# Patient Record
Sex: FEMALE | Race: WHITE | Employment: OTHER | URBAN - METROPOLITAN AREA
[De-identification: names, ages, dates, MRNs, and addresses within clinical notes are randomized per-mention and may not be internally consistent; named-entity substitution may affect disease eponyms.]

---

## 2021-03-24 ENCOUNTER — HOSPITAL ENCOUNTER (EMERGENCY)
Age: 71
Discharge: HOME | End: 2021-03-24
Attending: EMERGENCY MEDICINE
Payer: MEDICARE

## 2021-03-24 VITALS
HEIGHT: 63 IN | HEART RATE: 80 BPM | DIASTOLIC BLOOD PRESSURE: 91 MMHG | TEMPERATURE: 97.7 F | SYSTOLIC BLOOD PRESSURE: 133 MMHG | BODY MASS INDEX: 30.12 KG/M2 | RESPIRATION RATE: 16 BRPM | OXYGEN SATURATION: 95 % | WEIGHT: 170 LBS

## 2021-03-24 DIAGNOSIS — T78.40XA ALLERGIC REACTION TO DRUG, INITIAL ENCOUNTER: Primary | ICD-10-CM

## 2021-03-24 DIAGNOSIS — R21 RASH AND OTHER NONSPECIFIC SKIN ERUPTION: ICD-10-CM

## 2021-03-24 PROCEDURE — 99283 EMERGENCY DEPT VISIT LOW MDM: CPT

## 2021-03-24 PROCEDURE — 6370000000 HC RX 637 (ALT 250 FOR IP): Performed by: EMERGENCY MEDICINE

## 2021-03-24 RX ORDER — PREDNISONE 10 MG/1
20 TABLET ORAL DAILY
Qty: 10 TABLET | Refills: 0 | Status: SHIPPED | OUTPATIENT
Start: 2021-03-24 | End: 2021-03-29

## 2021-03-24 RX ORDER — HYDROXYZINE PAMOATE 25 MG/1
25 CAPSULE ORAL 3 TIMES DAILY PRN
Qty: 15 CAPSULE | Refills: 0 | Status: SHIPPED | OUTPATIENT
Start: 2021-03-24 | End: 2021-03-24 | Stop reason: SDUPTHER

## 2021-03-24 RX ORDER — PREDNISONE 20 MG/1
20 TABLET ORAL ONCE
Status: COMPLETED | OUTPATIENT
Start: 2021-03-24 | End: 2021-03-24

## 2021-03-24 RX ORDER — TRAZODONE HYDROCHLORIDE 100 MG/1
100 TABLET ORAL NIGHTLY
COMMUNITY

## 2021-03-24 RX ORDER — TRIAMCINOLONE ACETONIDE 5 MG/G
CREAM TOPICAL
Qty: 45 G | Refills: 0 | Status: SHIPPED | OUTPATIENT
Start: 2021-03-24 | End: 2021-03-31

## 2021-03-24 RX ORDER — DIPHENHYDRAMINE HCL 25 MG
25 TABLET ORAL ONCE
Status: COMPLETED | OUTPATIENT
Start: 2021-03-24 | End: 2021-03-24

## 2021-03-24 RX ORDER — TRIAMCINOLONE ACETONIDE 5 MG/G
CREAM TOPICAL
Qty: 45 G | Refills: 0 | Status: SHIPPED | OUTPATIENT
Start: 2021-03-24 | End: 2021-03-24 | Stop reason: SDUPTHER

## 2021-03-24 RX ORDER — PREDNISONE 10 MG/1
20 TABLET ORAL DAILY
Qty: 10 TABLET | Refills: 0 | Status: SHIPPED | OUTPATIENT
Start: 2021-03-24 | End: 2021-03-24 | Stop reason: SDUPTHER

## 2021-03-24 RX ORDER — DULOXETIN HYDROCHLORIDE 60 MG/1
90 CAPSULE, DELAYED RELEASE ORAL DAILY
COMMUNITY

## 2021-03-24 RX ORDER — HYDROXYZINE PAMOATE 25 MG/1
25 CAPSULE ORAL 3 TIMES DAILY PRN
Qty: 15 CAPSULE | Refills: 0 | Status: SHIPPED | OUTPATIENT
Start: 2021-03-24 | End: 2021-03-29

## 2021-03-24 RX ADMIN — DIPHENHYDRAMINE HYDROCHLORIDE 25 MG: 25 TABLET ORAL at 11:36

## 2021-03-24 RX ADMIN — PREDNISONE 20 MG: 20 TABLET ORAL at 11:37

## 2021-03-24 NOTE — ED PROVIDER NOTES
HPI:  3/24/21,   Time: 12:17 PM EDT         Myron Sanders is a 65 y.o. female presenting to the ED for rash left arm possibly from Covid vaccine, beginning more than 1 day ago. The complaint has been persistent, moderate in severity, and worsened by nothing. Patient denies any shortness of breath or throat closing. She has fairly large circular rash left upper arm where she received her vaccine. TEMPWETREAD      ROS:   Pertinent positives and negatives are stated within HPI, all other systems reviewed and are negative.  --------------------------------------------- PAST HISTORY ---------------------------------------------  Past Medical History:  has a past medical history of Depression and Post herpetic neuralgia. Past Surgical History:  has a past surgical history that includes bladder suspension; hernia repair; Cholecystectomy; and Varicose vein surgery. Social History:  reports that she has never smoked. She does not have any smokeless tobacco history on file. She reports previous alcohol use. She reports that she does not use drugs. Family History: family history is not on file. The patients home medications have been reviewed. Allergies: Betadine [povidone iodine]    -------------------------------------------------- RESULTS -------------------------------------------------  All laboratory and radiology results have been personally reviewed by myself   LABS:  No results found for this visit on 03/24/21. RADIOLOGY:  Interpreted by Radiologist.  No orders to display       ------------------------- NURSING NOTES AND VITALS REVIEWED ---------------------------   The nursing notes within the ED encounter and vital signs as below have been reviewed.    BP (!) 133/91   Pulse 80   Temp 97.7 °F (36.5 °C) (Temporal)   Resp 16   Ht 5' 3\" (1.6 m)   Wt 170 lb (77.1 kg)   SpO2 95%   BMI 30.11 kg/m²   Oxygen Saturation Interpretation: Normal      ---------------------------------------------------PHYSICAL EXAM--------------------------------------      Constitutional/General: Alert and oriented x3, well appearing, non toxic in NAD  Head: NC/AT  Eyes: PERRL, EOMI  Mouth: Oropharynx clear, handling secretions, no trismus  Neck: Supple, full ROM, no meningeal signs  Pulmonary: Lungs clear to auscultation bilaterally, no wheezes, rales, or rhonchi. Not in respiratory distress  Cardiovascular:  Regular rate and rhythm, no murmurs, gallops, or rubs. 2+ distal pulses  Abdomen: Soft, non tender, non distended,   Extremities: Moves all extremities x 4. Warm and well perfused  Skin: warm and dry ; 6 cm flat circular rash noted left upper arm laterally consistent where she received the vaccine. Neurologic: GCS 15,  Psych: Normal Affect      ------------------------------ ED COURSE/MEDICAL DECISION MAKING----------------------  Medications   predniSONE (DELTASONE) tablet 20 mg (20 mg Oral Given 3/24/21 1137)   diphenhydrAMINE (BENADRYL) tablet 25 mg (25 mg Oral Given 3/24/21 1136)         Medical Decision Making:    Exam and will start the patient on antihistamines and a short course of prednisone. Counseling: Patient is advised to be started on prednisone and an antihistamine, Vistaril for rash and itching. She is advised to follow-up with her PCP in 2 days however she states that she is in between doctors right now so she is advised that if symptoms worsen to return to this emergency room for reevaluation the emergency provider has spoken with the patient and discussed todays results, in addition to providing specific details for the plan of care and counseling regarding the diagnosis and prognosis. Questions are answered at this time and they are agreeable with the plan.      --------------------------------- IMPRESSION AND DISPOSITION ---------------------------------    IMPRESSION  1. Allergic reaction to drug, initial encounter    2.  Rash and other nonspecific skin eruption        DISPOSITION  Disposition: Discharge to home  Patient condition is fair                  Risa Mon MD  03/24/21 1782

## 2021-03-24 NOTE — ED NOTES
Pt presents with a 6 cm round red rash in left deltoid area from the covid vaccination that she received on the 12 th of this month. Pt denies any pain at this time. Pt is nervous.      Elizabeth Saucedo RN  03/24/21 1122

## 2021-04-20 ENCOUNTER — HOSPITAL ENCOUNTER (OUTPATIENT)
Dept: MAMMOGRAPHY | Age: 71
Discharge: HOME | End: 2021-04-22
Payer: MEDICARE

## 2021-04-20 DIAGNOSIS — Z12.31 BREAST CANCER SCREENING BY MAMMOGRAM: ICD-10-CM

## 2021-04-20 PROCEDURE — 77063 BREAST TOMOSYNTHESIS BI: CPT

## 2021-04-23 ENCOUNTER — HOSPITAL ENCOUNTER (OUTPATIENT)
Age: 71
Discharge: HOME | End: 2021-04-23
Payer: MEDICARE

## 2021-04-23 PROCEDURE — 36415 COLL VENOUS BLD VENIPUNCTURE: CPT

## 2021-04-23 PROCEDURE — 86803 HEPATITIS C AB TEST: CPT

## 2021-04-26 LAB — HEPATITIS C ANTIBODY INTERPRETATION: NORMAL

## 2022-06-06 ENCOUNTER — HOSPITAL ENCOUNTER (OUTPATIENT)
Age: 72
Discharge: HOME | End: 2022-06-08
Payer: MEDICARE

## 2022-06-06 ENCOUNTER — HOSPITAL ENCOUNTER (OUTPATIENT)
Dept: GENERAL RADIOLOGY | Age: 72
Discharge: HOME | End: 2022-06-08
Payer: MEDICARE

## 2022-06-06 DIAGNOSIS — M25.562 LEFT KNEE PAIN, UNSPECIFIED CHRONICITY: ICD-10-CM

## 2022-06-06 PROCEDURE — 73564 X-RAY EXAM KNEE 4 OR MORE: CPT

## 2022-07-18 ENCOUNTER — HOSPITAL ENCOUNTER (OUTPATIENT)
Age: 72
Discharge: HOME | End: 2022-07-18
Payer: MEDICARE

## 2022-07-18 LAB
GLUCOSE FASTING: 129 MG/DL (ref 74–99)
HBA1C MFR BLD: 6 % (ref 4–5.6)

## 2022-07-18 PROCEDURE — 82947 ASSAY GLUCOSE BLOOD QUANT: CPT

## 2022-07-18 PROCEDURE — 36415 COLL VENOUS BLD VENIPUNCTURE: CPT

## 2022-07-18 PROCEDURE — 83036 HEMOGLOBIN GLYCOSYLATED A1C: CPT

## 2022-08-09 ENCOUNTER — HOSPITAL ENCOUNTER (OUTPATIENT)
Dept: MAMMOGRAPHY | Age: 72
Discharge: HOME | End: 2022-08-11
Payer: MEDICARE

## 2022-08-09 DIAGNOSIS — Z12.31 ENCOUNTER FOR SCREENING MAMMOGRAM FOR MALIGNANT NEOPLASM OF BREAST: ICD-10-CM

## 2022-08-09 PROCEDURE — 77063 BREAST TOMOSYNTHESIS BI: CPT

## 2022-10-13 ENCOUNTER — HOSPITAL ENCOUNTER (EMERGENCY)
Age: 72
Discharge: HOME | End: 2022-10-13
Attending: EMERGENCY MEDICINE
Payer: MEDICARE

## 2022-10-13 ENCOUNTER — APPOINTMENT (OUTPATIENT)
Dept: GENERAL RADIOLOGY | Age: 72
End: 2022-10-13
Payer: MEDICARE

## 2022-10-13 VITALS
BODY MASS INDEX: 30.48 KG/M2 | WEIGHT: 172 LBS | SYSTOLIC BLOOD PRESSURE: 194 MMHG | DIASTOLIC BLOOD PRESSURE: 88 MMHG | TEMPERATURE: 97.5 F | HEIGHT: 63 IN | RESPIRATION RATE: 20 BRPM | HEART RATE: 79 BPM | OXYGEN SATURATION: 96 %

## 2022-10-13 DIAGNOSIS — S40.019A CONTUSION, SHOULDER AND UPPER ARM, MULTIPLE SITES, UNSPECIFIED LATERALITY, INITIAL ENCOUNTER: ICD-10-CM
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
## 2022-10-13 DIAGNOSIS — S40.029A CONTUSION, SHOULDER AND UPPER ARM, MULTIPLE SITES, UNSPECIFIED LATERALITY, INITIAL ENCOUNTER: ICD-10-CM

## 2022-10-13 PROCEDURE — 73090 X-RAY EXAM OF FOREARM: CPT

## 2022-10-13 PROCEDURE — 6370000000 HC RX 637 (ALT 250 FOR IP): Performed by: EMERGENCY MEDICINE

## 2022-10-13 PROCEDURE — 73060 X-RAY EXAM OF HUMERUS: CPT

## 2022-10-13 PROCEDURE — 99283 EMERGENCY DEPT VISIT LOW MDM: CPT

## 2022-10-13 RX ORDER — ACETAMINOPHEN 500 MG
1000 TABLET ORAL ONCE
Status: COMPLETED | OUTPATIENT
Start: 2022-10-13 | End: 2022-10-13

## 2022-10-13 RX ADMIN — ACETAMINOPHEN 1000 MG: 500 TABLET ORAL at 16:20

## 2022-10-13 ASSESSMENT — ENCOUNTER SYMPTOMS
SHORTNESS OF BREATH: 0
ABDOMINAL PAIN: 0
NAUSEA: 0
EYE REDNESS: 0
VOMITING: 0

## 2022-10-13 ASSESSMENT — PAIN - FUNCTIONAL ASSESSMENT: PAIN_FUNCTIONAL_ASSESSMENT: NONE - DENIES PAIN
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
## 2024-04-01 ENCOUNTER — HOSPITAL ENCOUNTER (INPATIENT)
Age: 74
LOS: 6 days | Discharge: HOME | DRG: 885 | End: 2024-04-08
Attending: EMERGENCY MEDICINE | Admitting: PSYCHIATRY & NEUROLOGY
Payer: MEDICARE

## 2024-04-01 DIAGNOSIS — R45.851 SUICIDAL IDEATION: Primary | ICD-10-CM

## 2024-04-01 LAB
ALBUMIN SERPL-MCNC: 3.9 G/DL (ref 3.5–5.2)
ALP SERPL-CCNC: 88 U/L (ref 35–104)
ALT SERPL-CCNC: 11 U/L (ref 0–32)
AMPHET UR QL SCN: NEGATIVE
ANION GAP SERPL CALCULATED.3IONS-SCNC: 8 MMOL/L (ref 7–16)
APAP SERPL-MCNC: <5 UG/ML (ref 10–30)
AST SERPL-CCNC: 13 U/L (ref 0–31)
BARBITURATES UR QL SCN: NEGATIVE
BASOPHILS # BLD: 0.04 K/UL (ref 0–0.2)
BASOPHILS NFR BLD: 1 % (ref 0–2)
BENZODIAZ UR QL: NEGATIVE
BILIRUB SERPL-MCNC: 0.3 MG/DL (ref 0–1.2)
BUN SERPL-MCNC: 14 MG/DL (ref 6–23)
BUPRENORPHINE UR QL: NEGATIVE
CALCIUM SERPL-MCNC: 9.5 MG/DL (ref 8.6–10.2)
CANNABINOIDS UR QL SCN: NEGATIVE
CHLORIDE SERPL-SCNC: 106 MMOL/L (ref 98–107)
CO2 SERPL-SCNC: 24 MMOL/L (ref 22–29)
COCAINE UR QL SCN: NEGATIVE
CREAT SERPL-MCNC: 0.8 MG/DL (ref 0.5–1)
EOSINOPHIL # BLD: 0.01 K/UL (ref 0.05–0.5)
EOSINOPHILS RELATIVE PERCENT: 0 % (ref 0–6)
ERYTHROCYTE [DISTWIDTH] IN BLOOD BY AUTOMATED COUNT: 12.9 % (ref 11.5–15)
ETHANOLAMINE SERPL-MCNC: <10 MG/DL
FENTANYL UR QL: NEGATIVE
GFR SERPL CREATININE-BSD FRML MDRD: 77 ML/MIN/1.73M2
GLUCOSE SERPL-MCNC: 108 MG/DL (ref 74–99)
HCT VFR BLD AUTO: 42.9 % (ref 34–48)
HGB BLD-MCNC: 14.6 G/DL (ref 11.5–15.5)
IMM GRANULOCYTES # BLD AUTO: <0.03 K/UL (ref 0–0.58)
IMM GRANULOCYTES NFR BLD: 0 % (ref 0–5)
LYMPHOCYTES NFR BLD: 1.99 K/UL (ref 1.5–4)
LYMPHOCYTES RELATIVE PERCENT: 24 % (ref 20–42)
MCH RBC QN AUTO: 31.3 PG (ref 26–35)
MCHC RBC AUTO-ENTMCNC: 34 G/DL (ref 32–34.5)
MCV RBC AUTO: 91.9 FL (ref 80–99.9)
METHADONE UR QL: NEGATIVE
MONOCYTES NFR BLD: 0.74 K/UL (ref 0.1–0.95)
MONOCYTES NFR BLD: 9 % (ref 2–12)
NEUTROPHILS NFR BLD: 66 % (ref 43–80)
NEUTS SEG NFR BLD: 5.38 K/UL (ref 1.8–7.3)
OPIATES UR QL SCN: NEGATIVE
OXYCODONE UR QL SCN: NEGATIVE
PCP UR QL SCN: NEGATIVE
PLATELET # BLD AUTO: 193 K/UL (ref 130–450)
PMV BLD AUTO: 11.6 FL (ref 7–12)
POTASSIUM SERPL-SCNC: 4.6 MMOL/L (ref 3.5–5)
PROT SERPL-MCNC: 6.6 G/DL (ref 6.4–8.3)
RBC # BLD AUTO: 4.67 M/UL (ref 3.5–5.5)
SALICYLATES SERPL-MCNC: <0.3 MG/DL (ref 0–30)
SODIUM SERPL-SCNC: 138 MMOL/L (ref 132–146)
TEST INFORMATION: NORMAL
TOXIC TRICYCLIC SC,BLOOD: NEGATIVE
WBC OTHER # BLD: 8.2 K/UL (ref 4.5–11.5)

## 2024-04-01 PROCEDURE — 99285 EMERGENCY DEPT VISIT HI MDM: CPT

## 2024-04-01 PROCEDURE — 80053 COMPREHEN METABOLIC PANEL: CPT

## 2024-04-01 PROCEDURE — 6370000000 HC RX 637 (ALT 250 FOR IP)

## 2024-04-01 PROCEDURE — G0480 DRUG TEST DEF 1-7 CLASSES: HCPCS

## 2024-04-01 PROCEDURE — 80307 DRUG TEST PRSMV CHEM ANLYZR: CPT

## 2024-04-01 PROCEDURE — 80143 DRUG ASSAY ACETAMINOPHEN: CPT

## 2024-04-01 PROCEDURE — 80179 DRUG ASSAY SALICYLATE: CPT

## 2024-04-01 PROCEDURE — 93005 ELECTROCARDIOGRAM TRACING: CPT

## 2024-04-01 PROCEDURE — 85025 COMPLETE CBC W/AUTO DIFF WBC: CPT

## 2024-04-01 RX ORDER — LORAZEPAM 1 MG/1
1 TABLET ORAL ONCE
Status: COMPLETED | OUTPATIENT
Start: 2024-04-01 | End: 2024-04-01

## 2024-04-01 RX ADMIN — LORAZEPAM 1 MG: 1 TABLET ORAL at 23:59

## 2024-04-01 ASSESSMENT — PAIN - FUNCTIONAL ASSESSMENT
PAIN_FUNCTIONAL_ASSESSMENT: NONE - DENIES PAIN
PAIN_FUNCTIONAL_ASSESSMENT: NONE - DENIES PAIN

## 2024-04-02 PROBLEM — F32.9 MAJOR DEPRESSION, SINGLE EPISODE: Status: ACTIVE | Noted: 2024-04-02

## 2024-04-02 PROBLEM — F32.9 MAJOR DEPRESSION, SINGLE EPISODE: Status: RESOLVED | Noted: 2024-04-02 | Resolved: 2024-04-02

## 2024-04-02 PROBLEM — F33.2 MAJOR DEPRESSIVE DISORDER, RECURRENT EPISODE, SEVERE WITH ANXIOUS DISTRESS (HCC): Status: ACTIVE | Noted: 2024-04-02

## 2024-04-02 LAB
EKG ATRIAL RATE: 60 BPM
EKG P AXIS: 73 DEGREES
EKG P-R INTERVAL: 182 MS
EKG Q-T INTERVAL: 398 MS
EKG QRS DURATION: 64 MS
EKG QTC CALCULATION (BAZETT): 398 MS
EKG R AXIS: 22 DEGREES
EKG T AXIS: 33 DEGREES
EKG VENTRICULAR RATE: 60 BPM

## 2024-04-02 PROCEDURE — 90792 PSYCH DIAG EVAL W/MED SRVCS: CPT | Performed by: NURSE PRACTITIONER

## 2024-04-02 PROCEDURE — 6370000000 HC RX 637 (ALT 250 FOR IP): Performed by: NURSE PRACTITIONER

## 2024-04-02 PROCEDURE — 93010 ELECTROCARDIOGRAM REPORT: CPT | Performed by: INTERNAL MEDICINE

## 2024-04-02 PROCEDURE — 1240000000 HC EMOTIONAL WELLNESS R&B

## 2024-04-02 PROCEDURE — 6370000000 HC RX 637 (ALT 250 FOR IP): Performed by: PSYCHIATRY & NEUROLOGY

## 2024-04-02 PROCEDURE — 6370000000 HC RX 637 (ALT 250 FOR IP)

## 2024-04-02 RX ORDER — LANOLIN ALCOHOL/MO/W.PET/CERES
3 CREAM (GRAM) TOPICAL NIGHTLY PRN
Status: DISCONTINUED | OUTPATIENT
Start: 2024-04-02 | End: 2024-04-08 | Stop reason: HOSPADM

## 2024-04-02 RX ORDER — HYDROXYZINE PAMOATE 25 MG/1
25 CAPSULE ORAL 3 TIMES DAILY PRN
Status: DISCONTINUED | OUTPATIENT
Start: 2024-04-02 | End: 2024-04-08 | Stop reason: HOSPADM

## 2024-04-02 RX ORDER — HALOPERIDOL 2 MG/1
3 TABLET ORAL EVERY 6 HOURS PRN
Status: DISCONTINUED | OUTPATIENT
Start: 2024-04-02 | End: 2024-04-08 | Stop reason: HOSPADM

## 2024-04-02 RX ORDER — PANTOPRAZOLE SODIUM 40 MG/1
40 TABLET, DELAYED RELEASE ORAL DAILY
COMMUNITY

## 2024-04-02 RX ORDER — ACETAMINOPHEN 325 MG/1
650 TABLET ORAL EVERY 4 HOURS PRN
Status: DISCONTINUED | OUTPATIENT
Start: 2024-04-02 | End: 2024-04-08 | Stop reason: HOSPADM

## 2024-04-02 RX ORDER — TRAZODONE HYDROCHLORIDE 50 MG/1
100 TABLET ORAL ONCE
Status: COMPLETED | OUTPATIENT
Start: 2024-04-02 | End: 2024-04-02

## 2024-04-02 RX ORDER — HALOPERIDOL 5 MG/ML
3 INJECTION INTRAMUSCULAR EVERY 6 HOURS PRN
Status: DISCONTINUED | OUTPATIENT
Start: 2024-04-02 | End: 2024-04-08 | Stop reason: HOSPADM

## 2024-04-02 RX ORDER — NICOTINE 21 MG/24HR
1 PATCH, TRANSDERMAL 24 HOURS TRANSDERMAL DAILY
Status: DISCONTINUED | OUTPATIENT
Start: 2024-04-02 | End: 2024-04-08 | Stop reason: HOSPADM

## 2024-04-02 RX ORDER — MAGNESIUM HYDROXIDE/ALUMINUM HYDROXICE/SIMETHICONE 120; 1200; 1200 MG/30ML; MG/30ML; MG/30ML
30 SUSPENSION ORAL PRN
Status: DISCONTINUED | OUTPATIENT
Start: 2024-04-02 | End: 2024-04-08 | Stop reason: HOSPADM

## 2024-04-02 RX ORDER — VENLAFAXINE HYDROCHLORIDE 37.5 MG/1
37.5 CAPSULE, EXTENDED RELEASE ORAL
Status: DISCONTINUED | OUTPATIENT
Start: 2024-04-02 | End: 2024-04-05

## 2024-04-02 RX ORDER — MIRTAZAPINE 15 MG/1
7.5 TABLET, FILM COATED ORAL NIGHTLY
Status: DISCONTINUED | OUTPATIENT
Start: 2024-04-02 | End: 2024-04-03

## 2024-04-02 RX ORDER — PANTOPRAZOLE SODIUM 40 MG/1
40 TABLET, DELAYED RELEASE ORAL DAILY
Status: DISCONTINUED | OUTPATIENT
Start: 2024-04-02 | End: 2024-04-08 | Stop reason: HOSPADM

## 2024-04-02 RX ADMIN — HYDROXYZINE PAMOATE 25 MG: 25 CAPSULE ORAL at 21:45

## 2024-04-02 RX ADMIN — MIRTAZAPINE 7.5 MG: 15 TABLET, FILM COATED ORAL at 21:45

## 2024-04-02 RX ADMIN — PANTOPRAZOLE SODIUM 40 MG: 40 TABLET, DELAYED RELEASE ORAL at 17:34

## 2024-04-02 RX ADMIN — TRAZODONE HYDROCHLORIDE 100 MG: 50 TABLET ORAL at 02:46

## 2024-04-02 RX ADMIN — VENLAFAXINE HYDROCHLORIDE 37.5 MG: 37.5 CAPSULE, EXTENDED RELEASE ORAL at 10:55

## 2024-04-02 RX ADMIN — Medication 3 MG: at 21:45

## 2024-04-02 ASSESSMENT — SLEEP AND FATIGUE QUESTIONNAIRES
AVERAGE NUMBER OF SLEEP HOURS: 7
SLEEP PATTERN: DIFFICULTY FALLING ASLEEP;DISTURBED/INTERRUPTED SLEEP;RESTLESSNESS;INSOMNIA
DO YOU USE A SLEEP AID: YES
DO YOU HAVE DIFFICULTY SLEEPING: YES
AVERAGE NUMBER OF SLEEP HOURS: 6
SLEEP PATTERN: DIFFICULTY FALLING ASLEEP;DISTURBED/INTERRUPTED SLEEP
DO YOU HAVE DIFFICULTY SLEEPING: YES
DO YOU USE A SLEEP AID: YES

## 2024-04-02 ASSESSMENT — PATIENT HEALTH QUESTIONNAIRE - PHQ9
2. FEELING DOWN, DEPRESSED OR HOPELESS: SEVERAL DAYS
SUM OF ALL RESPONSES TO PHQ QUESTIONS 1-9: 16
2. FEELING DOWN, DEPRESSED OR HOPELESS: MORE THAN HALF THE DAYS
5. POOR APPETITE OR OVEREATING: SEVERAL DAYS
3. TROUBLE FALLING OR STAYING ASLEEP: NEARLY EVERY DAY
SUM OF ALL RESPONSES TO PHQ9 QUESTIONS 1 & 2: 4
SUM OF ALL RESPONSES TO PHQ QUESTIONS 1-9: 2
7. TROUBLE CONCENTRATING ON THINGS, SUCH AS READING THE NEWSPAPER OR WATCHING TELEVISION: MORE THAN HALF THE DAYS
9. THOUGHTS THAT YOU WOULD BE BETTER OFF DEAD, OR OF HURTING YOURSELF: SEVERAL DAYS
SUM OF ALL RESPONSES TO PHQ9 QUESTIONS 1 & 2: 2
SUM OF ALL RESPONSES TO PHQ QUESTIONS 1-9: 2
1. LITTLE INTEREST OR PLEASURE IN DOING THINGS: MORE THAN HALF THE DAYS
1. LITTLE INTEREST OR PLEASURE IN DOING THINGS: SEVERAL DAYS
6. FEELING BAD ABOUT YOURSELF - OR THAT YOU ARE A FAILURE OR HAVE LET YOURSELF OR YOUR FAMILY DOWN: NOT AT ALL
4. FEELING TIRED OR HAVING LITTLE ENERGY: NEARLY EVERY DAY
SUM OF ALL RESPONSES TO PHQ QUESTIONS 1-9: 2
SUM OF ALL RESPONSES TO PHQ QUESTIONS 1-9: 16
8. MOVING OR SPEAKING SO SLOWLY THAT OTHER PEOPLE COULD HAVE NOTICED. OR THE OPPOSITE, BEING SO FIGETY OR RESTLESS THAT YOU HAVE BEEN MOVING AROUND A LOT MORE THAN USUAL: MORE THAN HALF THE DAYS
10. IF YOU CHECKED OFF ANY PROBLEMS, HOW DIFFICULT HAVE THESE PROBLEMS MADE IT FOR YOU TO DO YOUR WORK, TAKE CARE OF THINGS AT HOME, OR GET ALONG WITH OTHER PEOPLE: VERY DIFFICULT
SUM OF ALL RESPONSES TO PHQ QUESTIONS 1-9: 16
SUM OF ALL RESPONSES TO PHQ QUESTIONS 1-9: 2
SUM OF ALL RESPONSES TO PHQ QUESTIONS 1-9: 15

## 2024-04-02 ASSESSMENT — LIFESTYLE VARIABLES
HOW OFTEN DO YOU HAVE A DRINK CONTAINING ALCOHOL: NEVER
HOW MANY STANDARD DRINKS CONTAINING ALCOHOL DO YOU HAVE ON A TYPICAL DAY: PATIENT DOES NOT DRINK
HOW MANY STANDARD DRINKS CONTAINING ALCOHOL DO YOU HAVE ON A TYPICAL DAY: PATIENT DOES NOT DRINK
HOW OFTEN DO YOU HAVE A DRINK CONTAINING ALCOHOL: NEVER

## 2024-04-02 NOTE — GROUP NOTE
Group Therapy Note    Date: 4/2/2024    Group Start Time: 1045  Group End Time: 1125  Group Topic: Psychoeducation    [facility]    Marianela Simmons, CTRS    Date: 4/2/2024  Module Name:  owning your feelings: what is underneath     Patient's Goal:  pt will be able to identify specific feelings beyond sad, mad, fine etc. Be willing to share common struggles within group.   Notes:  pleasant and sharing in group, able to participate appropriately and accepting of handout.     Status After Intervention:  Improved    Participation Level: Active Listener and Interactive    Participation Quality: Appropriate, Attentive, and Sharing      Speech:  normal      Thought Process/Content: Logical      Affective Functioning: Congruent      Mood: euthymic      Level of consciousness:  Alert, Oriented x4, and Attentive      Response to Learning: Able to verbalize/acknowledge new learning, Able to retain information, and Progressing to goal      Endings: None Reported    Modes of Intervention: Education, Support, Socialization, Clarifying, and Problem-solving      Discipline Responsible: Psychoeducational Specialist      Signature:  ANA Bolivar

## 2024-04-02 NOTE — ED PROVIDER NOTES
[facility] EMERGENCY DEPARTMENT  EMERGENCY DEPARTMENT ENCOUNTER      Pt Name: Kerri Woo  MRN: [id number]  Birthdate 1950  Date of evaluation: 4/1/2024  Provider: Jimmy Russ DO  PCP: Castro Boles Jr., MD    HPI: 73-year-old female presenting emerged part complaints of suicidal ideation with plan to drown herself.  Reports that she was recently on a medication which worsen her depression/SI.  Stopped medication 2 days ago.  Denies any HI denies any hallucinations.  Cooperative on initial exam.  Reports previous COVID 2 weeks ago.  Denies any acute changes today from patient's baseline    Chief Complaint   Patient presents with    Psychiatric Evaluation     +SI w/ plan to drown herself in the bathroom. Denies HI. Denies A/VH hallucinations.       Review of Systems   Constitutional:  Negative for chills, fatigue and fever.   HENT:  Negative for trouble swallowing and voice change.    Eyes:  Negative for photophobia and visual disturbance.   Respiratory:  Negative for shortness of breath and wheezing.    Cardiovascular:  Negative for chest pain.   Gastrointestinal:  Negative for abdominal pain, diarrhea, nausea and vomiting.   Genitourinary:  Negative for dysuria, hematuria and urgency.   Musculoskeletal:  Negative for arthralgias, back pain, neck pain and neck stiffness.   Skin:  Negative for rash and wound.   Neurological:  Negative for dizziness, syncope and headaches.   Psychiatric/Behavioral:  Positive for suicidal ideas. Negative for behavioral problems and confusion.         Physical Exam  Vitals reviewed.   Constitutional:       General: She is not in acute distress.     Appearance: Normal appearance.   HENT:      Head: Normocephalic.      Right Ear: External ear normal.      Left Ear: External ear normal.      Nose: Nose normal.      Mouth/Throat:      Mouth: Mucous membranes are moist.   Eyes:      General:         Right eye: No discharge.         Left

## 2024-04-02 NOTE — ED NOTES
Patient is on a involuntary hold by ED physician.     RN made aware to review for admission without social work assessment.

## 2024-04-02 NOTE — H&P
Department of Psychiatry  History and Physical - Adult     I have personally seen and assessed the patient this morning along with Dr. Medrano I agree with the below assessment evaluation recommendations by the medical student  Mental status examination 73-year-old reveals female disheveled, cooperative and forthcoming for.  Psychomotor reveals no abnormality.  Speech is clear, regular rate volume tone.  Eye contact is average during assessment.  Mood is \"depressed and anxious\".  Affect sad, blunt anxious congruent with stated mood.  Thought process linear goal-directed no looseness of association no flight of ideas.  Thought content devoid of auditory or visual hallucinations there is no overt or covert sign of psychosis or paranoia.  Has been endorsing suicidal ideation with plan to drown self, however is no longer endorsing wanting to harm herself.  Devoid of homicidal ideation intent or plan.  Memory intact during conversation cognitive function baseline.  Insight judgment fair.  Alert and oriented to person place time situation.      CHIEF COMPLAINT:  \"I have been having suicidal thoughts and plan for the last few days after switching my depression medications\"    History obtained from:  patient    Patient was seen after discussing with the treatment team and reviewing the chart    CIRCUMSTANCES OF ADMISSION: Kerri Woo is a 73-year-old female with a past psychiatric history of major depressive disorder and presented to the ED due to increasing suicidal thoughts and depression. She was placed on an involuntary hold by the ED physician due to suicidal ideation with plan to drown self in bathtub. Precipitating factors include switching her antidepressant medications 6 days ago. Duration of depressive symptoms has been a few months with acute worsening of suicidal ideation over the last 4-5 days.    HISTORY OF PRESENT ILLNESS:      The patient is a 73 y.o.  female with significant past psychiatric

## 2024-04-02 NOTE — ED NOTES
Spoke to CRISTINA Slater on [address] who stated patient will go to bed 7511[facility]. Called and notified Kari in admitting.

## 2024-04-02 NOTE — GROUP NOTE
Group Therapy Note    Date: 4/2/2024    Group Start Time: 1410  Group End Time: 1505  Group Topic: Cognitive Skills    [facility]    Guicho Bang MSW        Group Therapy Note    Attendees: 11       Patient's Goal: To actively participate in group discussion on coping skills and using music therapy to cope with stressors.     Notes:  Pt was an active participant in group.    Status After Intervention:  Improved    Participation Level: Active Listener and Interactive    Participation Quality: Appropriate, Attentive, Sharing, and Supportive      Speech:  normal      Thought Process/Content: Linear      Affective Functioning: Congruent      Mood: depressed      Level of consciousness:  Alert, Oriented x4, and Attentive      Response to Learning: Able to verbalize current knowledge/experience, Able to verbalize/acknowledge new learning, and Able to retain information      Endings: None Reported    Modes of Intervention: Education, Support, Socialization, Exploration, Clarifying, and Problem-solving      Discipline Responsible: /Counselor      Signature:  DARRON Nava

## 2024-04-02 NOTE — PLAN OF CARE
Problem: Self Harm/Suicidality  Goal: Will have no self-injury during hospital stay  Description: INTERVENTIONS:  1.  Ensure constant observer at bedside with Q15M safety checks  2.  Maintain a safe environment  3.  Secure patient belongings  4.  Ensure family/visitors adhere to safety recommendations  5.  Ensure safety tray has been added to patient's diet order  6.  Every shift and PRN: Re-assess suicidal risk via Frequent Screener    Outcome: Progressing     Problem: Depression  Goal: Will be euthymic at discharge  Description: INTERVENTIONS:  1. Administer medication as ordered  2. Provide emotional support via 1:1 interaction with staff  3. Encourage involvement in milieu/groups/activities  4. Monitor for social isolation  Outcome: Progressing     Problem: Anxiety  Goal: Will report anxiety at manageable levels  Description: INTERVENTIONS:  1. Administer medication as ordered  2. Teach and rehearse alternative coping skills  3. Provide emotional support with 1:1 interaction with staff  Outcome: Progressing

## 2024-04-02 NOTE — PLAN OF CARE
Problem: Self Harm/Suicidality  Goal: Will have no self-injury during hospital stay  Description: INTERVENTIONS:  1.  Ensure constant observer at bedside with Q15M safety checks  2.  Maintain a safe environment  3.  Secure patient belongings  4.  Ensure family/visitors adhere to safety recommendations  5.  Ensure safety tray has been added to patient's diet order  6.  Every shift and PRN: Re-assess suicidal risk via Frequent Screener    4/2/2024 0945 by Cary Caputo RN  Outcome: Progressing     Problem: Depression  Goal: Will be euthymic at discharge  Description: INTERVENTIONS:  1. Administer medication as ordered  2. Provide emotional support via 1:1 interaction with staff  3. Encourage involvement in milieu/groups/activities  4. Monitor for social isolation  4/2/2024 0945 by Cary Caputo RN  Outcome: Progressing     Problem: Anxiety  Goal: Will report anxiety at manageable levels  Description: INTERVENTIONS:  1. Administer medication as ordered  2. Teach and rehearse alternative coping skills  3. Provide emotional support with 1:1 interaction with staff  4/2/2024 0945 by Cary Caputo RN  Outcome: Progressing       Patient denies SI/HI/Hallucinations. Patient is in control of her behavior and medication compliant. Patient ate breakfast and is cooperative for assessment. No active distress noted, respirations even and unlabored. Will continue to monitor and will intervene as needed.

## 2024-04-03 LAB
CHOLEST SERPL-MCNC: 140 MG/DL
HBA1C MFR BLD: 5.6 % (ref 4–5.6)
HDLC SERPL-MCNC: 48 MG/DL
LDLC SERPL CALC-MCNC: 76 MG/DL
TRIGL SERPL-MCNC: 79 MG/DL
VLDLC SERPL CALC-MCNC: 16 MG/DL

## 2024-04-03 PROCEDURE — 83036 HEMOGLOBIN GLYCOSYLATED A1C: CPT

## 2024-04-03 PROCEDURE — 80061 LIPID PANEL: CPT

## 2024-04-03 PROCEDURE — 99232 SBSQ HOSP IP/OBS MODERATE 35: CPT | Performed by: NURSE PRACTITIONER

## 2024-04-03 PROCEDURE — 6370000000 HC RX 637 (ALT 250 FOR IP): Performed by: PSYCHIATRY & NEUROLOGY

## 2024-04-03 PROCEDURE — 6370000000 HC RX 637 (ALT 250 FOR IP): Performed by: NURSE PRACTITIONER

## 2024-04-03 PROCEDURE — 36415 COLL VENOUS BLD VENIPUNCTURE: CPT

## 2024-04-03 PROCEDURE — 1240000000 HC EMOTIONAL WELLNESS R&B

## 2024-04-03 RX ORDER — MIRTAZAPINE 15 MG/1
15 TABLET, FILM COATED ORAL NIGHTLY
Status: DISCONTINUED | OUTPATIENT
Start: 2024-04-03 | End: 2024-04-08 | Stop reason: HOSPADM

## 2024-04-03 RX ADMIN — VENLAFAXINE HYDROCHLORIDE 37.5 MG: 37.5 CAPSULE, EXTENDED RELEASE ORAL at 09:13

## 2024-04-03 RX ADMIN — MIRTAZAPINE 15 MG: 15 TABLET, FILM COATED ORAL at 21:25

## 2024-04-03 RX ADMIN — HYDROXYZINE PAMOATE 25 MG: 25 CAPSULE ORAL at 21:25

## 2024-04-03 RX ADMIN — PANTOPRAZOLE SODIUM 40 MG: 40 TABLET, DELAYED RELEASE ORAL at 09:13

## 2024-04-03 RX ADMIN — HYDROXYZINE PAMOATE 25 MG: 25 CAPSULE ORAL at 07:05

## 2024-04-03 RX ADMIN — Medication 3 MG: at 21:25

## 2024-04-03 ASSESSMENT — PAIN SCALES - GENERAL: PAINLEVEL_OUTOF10: 0

## 2024-04-03 NOTE — GROUP NOTE
Group Therapy Note    Date: 4/3/2024    Group Start Time: 1410  Group End Time: 1448  Group Topic: Cognitive Skills    [facility]    Guicho Bang MSW        Group Therapy Note    Attendees: 9       Patient's Goal: To actively participate in group discussion on using Meditation to assist with controlling your emotions.     Notes: Pt was an active participant in group discussion    Status After Intervention:  Improved    Participation Level: Active Listener and Interactive    Participation Quality: Appropriate, Attentive, and Sharing      Speech:  normal      Thought Process/Content: Linear      Affective Functioning: Congruent      Mood: depressed      Level of consciousness:  Alert, Oriented x4, and Attentive      Response to Learning: Able to verbalize current knowledge/experience, Able to verbalize/acknowledge new learning, and Able to retain information      Endings: None Reported    Modes of Intervention: Education, Support, Socialization, Exploration, Clarifying, and Problem-solving      Discipline Responsible: /Counselor      Signature:  DARRON Nava     Uterine prolapse

## 2024-04-03 NOTE — PLAN OF CARE
Problem: Self Harm/Suicidality  Goal: Will have no self-injury during hospital stay  Description: INTERVENTIONS:  1.  Ensure constant observer at bedside with Q15M safety checks  2.  Maintain a safe environment  3.  Secure patient belongings  4.  Ensure family/visitors adhere to safety recommendations  5.  Ensure safety tray has been added to patient's diet order  6.  Every shift and PRN: Re-assess suicidal risk via Frequent Screener    4/2/2024 2216 by Marya Anderson RN  Outcome: Progressing     Problem: Depression  Goal: Will be euthymic at discharge  Description: INTERVENTIONS:  1. Administer medication as ordered  2. Provide emotional support via 1:1 interaction with staff  3. Encourage involvement in milieu/groups/activities  4. Monitor for social isolation  4/2/2024 2216 by Marya Anderson RN  Outcome: Progressing     Problem: Anxiety  Goal: Will report anxiety at manageable levels  Description: INTERVENTIONS:  1. Administer medication as ordered  2. Teach and rehearse alternative coping skills  3. Provide emotional support with 1:1 interaction with staff  4/2/2024 2216 by Marya Anderson RN  Outcome: Progressing

## 2024-04-03 NOTE — BH NOTE
Patient denies suicidal ideation, homicidal ideations and AVH. Reports anxiety a 2/10 and depression 0/10. Presents calm and cooperative during assessment.  Patient is out on the unit and is  social with peers.  Medications taken without issue.  No complaints or concerns verbalized at this time.  No unit problems reported.  Will continue to observe and support.

## 2024-04-03 NOTE — PLAN OF CARE
Patient denies SI/HI/Hallucinations. Patient is in control of her behavior. No active distress is noted. Patient states that since starting new medication yesterday she is feeling an increase in anxiety and states, \"I have not felt this anxious in a long time.\" Patient is cooperative for assessment. Patient medication compliant. No active distress noted, respirations even and unlabored. Will continue to monitor and will intervene as needed with close 15 minute rounds         Problem: Self Harm/Suicidality  Goal: Will have no self-injury during hospital stay  Description: INTERVENTIONS:  1.  Ensure constant observer at bedside with Q15M safety checks  2.  Maintain a safe environment  3.  Secure patient belongings  4.  Ensure family/visitors adhere to safety recommendations  5.  Ensure safety tray has been added to patient's diet order  6.  Every shift and PRN: Re-assess suicidal risk via Frequent Screener    Outcome: Progressing     Problem: Depression  Goal: Will be euthymic at discharge  Description: INTERVENTIONS:  1. Administer medication as ordered  2. Provide emotional support via 1:1 interaction with staff  3. Encourage involvement in milieu/groups/activities  4. Monitor for social isolation  Outcome: Progressing     Problem: Involuntary Admit  Goal: Will cooperate with staff recommendations and doctor's orders and will demonstrate appropriate behavior  Description: INTERVENTIONS:  1. Treat underlying conditions and offer medication as ordered  2. Educate regarding involuntary admission procedures and rules  3. Contain excessive/inappropriate behavior per unit and hospital policies  Outcome: Progressing     Problem: Risk for Elopement  Goal: Patient will not exit the unit/facility without proper excort  Outcome: Progressing

## 2024-04-04 PROCEDURE — 6370000000 HC RX 637 (ALT 250 FOR IP): Performed by: NURSE PRACTITIONER

## 2024-04-04 PROCEDURE — 1240000000 HC EMOTIONAL WELLNESS R&B

## 2024-04-04 PROCEDURE — 99232 SBSQ HOSP IP/OBS MODERATE 35: CPT | Performed by: NURSE PRACTITIONER

## 2024-04-04 PROCEDURE — 6370000000 HC RX 637 (ALT 250 FOR IP): Performed by: PSYCHIATRY & NEUROLOGY

## 2024-04-04 RX ORDER — MEMANTINE HYDROCHLORIDE 5 MG/1
5 TABLET ORAL 2 TIMES DAILY
Status: DISCONTINUED | OUTPATIENT
Start: 2024-04-04 | End: 2024-04-08 | Stop reason: HOSPADM

## 2024-04-04 RX ADMIN — MIRTAZAPINE 15 MG: 15 TABLET, FILM COATED ORAL at 21:41

## 2024-04-04 RX ADMIN — MEMANTINE 5 MG: 5 TABLET ORAL at 21:40

## 2024-04-04 RX ADMIN — PANTOPRAZOLE SODIUM 40 MG: 40 TABLET, DELAYED RELEASE ORAL at 09:21

## 2024-04-04 RX ADMIN — HYDROXYZINE PAMOATE 25 MG: 25 CAPSULE ORAL at 21:41

## 2024-04-04 RX ADMIN — MEMANTINE 5 MG: 5 TABLET ORAL at 13:06

## 2024-04-04 RX ADMIN — VENLAFAXINE HYDROCHLORIDE 37.5 MG: 37.5 CAPSULE, EXTENDED RELEASE ORAL at 09:21

## 2024-04-04 ASSESSMENT — PAIN SCALES - GENERAL
PAINLEVEL_OUTOF10: 0
PAINLEVEL_OUTOF10: 0

## 2024-04-04 NOTE — PLAN OF CARE
Problem: Depression  Goal: Will be euthymic at discharge  Description: INTERVENTIONS:  1. Administer medication as ordered  2. Provide emotional support via 1:1 interaction with staff  3. Encourage involvement in milieu/groups/activities  4. Monitor for social isolation  4/3/2024 2258 by Miguel Jensen RN  Outcome: Progressing     Problem: Self Harm/Suicidality  Goal: Will have no self-injury during hospital stay  Description: INTERVENTIONS:  1.  Ensure constant observer at bedside with Q15M safety checks  2.  Maintain a safe environment  3.  Secure patient belongings  4.  Ensure family/visitors adhere to safety recommendations  5.  Ensure safety tray has been added to patient's diet order  6.  Every shift and PRN: Re-assess suicidal risk via Frequent Screener    4/3/2024 2258 by Miguel Jensen RN  Outcome: Progressing     Problem: Anxiety  Goal: Will report anxiety at manageable levels  Description: INTERVENTIONS:  1. Administer medication as ordered  2. Teach and rehearse alternative coping skills  3. Provide emotional support with 1:1 interaction with staff  Outcome: Progressing    Patient out in day room with other patients, socializing and watching television. Appears anxious and sad but is friendly and cooperative. States, \"I am doing as good as I can be in here.\" Denies any suicidal or homicidal ideations. Denies any auditory or visual hallucinations. Encouraged patient to let staff know should she have any questions or concerns. Verbalized understanding. Will continue to monitor.

## 2024-04-04 NOTE — GROUP NOTE
Group Therapy Note    Date: 4/4/2024    Group Start Time: 1530  Group End Time: 1615  Group Topic: Recreational    [facility]    Marianela Simmons, CTRS    Date: 4/4/2024  Type of Group: Recreational    Wellness Binder Information  Module Name: [employer] Therapy     Patient's Goal:  Patients will be able to interact with others, enjoy humour of comedy and connect with others.      Notes:  pleasant and able to relate with others.     Status After Intervention:  Improved    Participation Level: Active Listener and Interactive    Participation Quality: Appropriate, Attentive, and Sharing      Speech:  normal      Thought Process/Content: Logical      Affective Functioning: Congruent      Mood: euthymic      Level of consciousness:  Alert, Oriented x4, and Attentive      Response to Learning: Able to verbalize/acknowledge new learning, Able to retain information, and Progressing to goal      Endings: None Reported    Modes of Intervention: Support, Socialization, Exploration, and Media      Discipline Responsible: Psychoeducational Specialist      Signature:  Marianela Simmons, CTRS

## 2024-04-04 NOTE — GROUP NOTE
Group Therapy Note    Date: 4/4/2024    Group Start Time: 1000  Group End Time: 1115  Group Topic: Psychoeducation    [facility]    Marianela Simmons, ANA        Group Therapy Note      Module Name:  coping skills for stress management     Patient's Goal:  patient will be able to id 6 coping skills for managing stress levels.     Notes:  pt observed as an active listener, engaged in group activity sharing appropriately, and accepting of handout.     Status After Intervention:  Improved    Participation Level: Active Listener and Interactive    Participation Quality: Appropriate, Attentive, and Sharing      Speech:  normal      Thought Process/Content: Logical      Affective Functioning: Congruent      Mood: euphoric      Level of consciousness:  Alert, Oriented x4, and Attentive      Response to Learning: Able to retain information and Progressing to goal      Endings: None Reported    Modes of Intervention: Education, Support, and Exploration      Discipline Responsible: Psychoeducational Specialist      Signature:  ANA Bolivar

## 2024-04-04 NOTE — PLAN OF CARE
Problem: Self Harm/Suicidality  Goal: Will have no self-injury during hospital stay  Description: INTERVENTIONS:  1.  Ensure constant observer at bedside with Q15M safety checks  2.  Maintain a safe environment  3.  Secure patient belongings  4.  Ensure family/visitors adhere to safety recommendations  5.  Ensure safety tray has been added to patient's diet order  6.  Every shift and PRN: Re-assess suicidal risk via Frequent Screener    4/4/2024 1037 by Milagros Piedra RN  Outcome: Progressing     Problem: Depression  Goal: Will be euthymic at discharge  Description: INTERVENTIONS:  1. Administer medication as ordered  2. Provide emotional support via 1:1 interaction with staff  3. Encourage involvement in milieu/groups/activities  4. Monitor for social isolation  4/4/2024 1037 by Milagros Piedra RN  Outcome: Progressing     Problem: Anxiety  Goal: Will report anxiety at manageable levels  Description: INTERVENTIONS:  1. Administer medication as ordered  2. Teach and rehearse alternative coping skills  3. Provide emotional support with 1:1 interaction with staff  4/4/2024 1037 by Milagros Piedra RN  Outcome: Progressing     Problem: Involuntary Admit  Goal: Will cooperate with staff recommendations and doctor's orders and will demonstrate appropriate behavior  Description: INTERVENTIONS:  1. Treat underlying conditions and offer medication as ordered  2. Educate regarding involuntary admission procedures and rules  3. Contain excessive/inappropriate behavior per unit and hospital policies  Outcome: Progressing     Problem: Risk for Elopement  Goal: Patient will not exit the unit/facility without proper excort  Outcome: Progressing     Problem: Pain  Goal: Verbalizes/displays adequate comfort level or baseline comfort level  Outcome: Progressing     Patient denies suicidal ideations, homicidal ideations, and hallucinations. Patient states her anxiety felt controlled when she woke up but once she began

## 2024-04-04 NOTE — GROUP NOTE
Group Therapy Note    Date: 4/4/2024    Group Start Time: 1400  Group End Time: 1440  Group Topic: Cognitive Skills    [facility]    Guicho Bang MSW        Group Therapy Note    Attendees: 8       Patient's Goal: To actively participate in group discussion on What are Personal Boundaries and Types of Different Boundaries.     Notes: Pt was an active participant in group discussion.    Status After Intervention:  Improved    Participation Level: Active Listener and Interactive    Participation Quality: Appropriate, Attentive, and Supportive      Speech:  normal      Thought Process/Content: Linear      Affective Functioning: Congruent      Mood: depressed      Level of consciousness:  Alert, Oriented x4, and Attentive      Response to Learning: Able to verbalize current knowledge/experience, Able to verbalize/acknowledge new learning, and Able to retain information      Endings: None Reported    Modes of Intervention: Education, Support, Socialization, Exploration, Clarifying, and Problem-solving      Discipline Responsible: /Counselor      Signature:  DARRON Nava

## 2024-04-04 NOTE — GROUP NOTE
Shared goal for the day as to keep moving forward.                                                                       Group Therapy Note    Date: 4/4/2024    Group Start Time: 0830  Group End Time: 0900  Group Topic: Community Meeting    [facility]    Marianela Simmons, CTRS    Date: 4/4/2024  Module Name:  Community Meeting     Patient's Goal:  Patient will be able to id daily routine, staffing assignments and floor rules. Will be prompted to share goal for the day.    Notes:  Appeared to be an active listener, able to set goal for the day.     Status After Intervention:  Improved    Participation Level: Active Listener and Interactive    Participation Quality: Appropriate, Attentive, and Sharing      Speech:  normal      Thought Process/Content: Logical      Affective Functioning: Congruent      Mood: euthymic      Level of consciousness:  Alert, Oriented x4, and Attentive      Response to Learning: Able to verbalize/acknowledge new learning and Able to retain information      Endings: None Reported    Modes of Intervention: Education, Support, Socialization, and Clarifying      Discipline Responsible: Psychoeducational Specialist      Signature:  Marianela Simmons CTRS

## 2024-04-05 PROCEDURE — 99232 SBSQ HOSP IP/OBS MODERATE 35: CPT | Performed by: NURSE PRACTITIONER

## 2024-04-05 PROCEDURE — 6370000000 HC RX 637 (ALT 250 FOR IP): Performed by: PSYCHIATRY & NEUROLOGY

## 2024-04-05 PROCEDURE — 1240000000 HC EMOTIONAL WELLNESS R&B

## 2024-04-05 PROCEDURE — 6370000000 HC RX 637 (ALT 250 FOR IP): Performed by: NURSE PRACTITIONER

## 2024-04-05 RX ORDER — CALCIUM CARBONATE 500 MG/1
500 TABLET, CHEWABLE ORAL 3 TIMES DAILY PRN
Status: DISCONTINUED | OUTPATIENT
Start: 2024-04-05 | End: 2024-04-08 | Stop reason: HOSPADM

## 2024-04-05 RX ORDER — VENLAFAXINE HYDROCHLORIDE 75 MG/1
75 CAPSULE, EXTENDED RELEASE ORAL
Status: DISCONTINUED | OUTPATIENT
Start: 2024-04-06 | End: 2024-04-08 | Stop reason: HOSPADM

## 2024-04-05 RX ADMIN — PANTOPRAZOLE SODIUM 40 MG: 40 TABLET, DELAYED RELEASE ORAL at 09:01

## 2024-04-05 RX ADMIN — HYDROXYZINE PAMOATE 25 MG: 25 CAPSULE ORAL at 12:24

## 2024-04-05 RX ADMIN — VENLAFAXINE HYDROCHLORIDE 37.5 MG: 37.5 CAPSULE, EXTENDED RELEASE ORAL at 09:01

## 2024-04-05 RX ADMIN — HYDROXYZINE PAMOATE 25 MG: 25 CAPSULE ORAL at 22:36

## 2024-04-05 RX ADMIN — MEMANTINE 5 MG: 5 TABLET ORAL at 09:01

## 2024-04-05 RX ADMIN — MIRTAZAPINE 15 MG: 15 TABLET, FILM COATED ORAL at 22:35

## 2024-04-05 RX ADMIN — ALUMINUM HYDROXIDE, MAGNESIUM HYDROXIDE, AND SIMETHICONE 30 ML: 1200; 120; 1200 SUSPENSION ORAL at 01:01

## 2024-04-05 RX ADMIN — ALUMINUM HYDROXIDE, MAGNESIUM HYDROXIDE, AND SIMETHICONE 30 ML: 1200; 120; 1200 SUSPENSION ORAL at 09:54

## 2024-04-05 RX ADMIN — MEMANTINE 5 MG: 5 TABLET ORAL at 22:36

## 2024-04-05 RX ADMIN — Medication 3 MG: at 22:35

## 2024-04-05 ASSESSMENT — PAIN DESCRIPTION - ORIENTATION: ORIENTATION: LOWER

## 2024-04-05 ASSESSMENT — PAIN DESCRIPTION - LOCATION: LOCATION: ABDOMEN

## 2024-04-05 ASSESSMENT — PAIN DESCRIPTION - DESCRIPTORS: DESCRIPTORS: TIGHTNESS

## 2024-04-05 ASSESSMENT — PAIN - FUNCTIONAL ASSESSMENT: PAIN_FUNCTIONAL_ASSESSMENT: ACTIVITIES ARE NOT PREVENTED

## 2024-04-05 ASSESSMENT — PAIN SCALES - GENERAL: PAINLEVEL_OUTOF10: 4

## 2024-04-05 NOTE — PLAN OF CARE
Denies SI/HI  denies hallucinations  mood is improved since admission   remains anxious  out on the unit but quiet  cooperative with meds  attending groups  will continue to monitor

## 2024-04-05 NOTE — GROUP NOTE
Group Therapy Note    Date: 4/5/2024    Group Start Time: 1030  Group End Time: 1045  Group Topic: Community Meeting    [facility]    Hallie Hutchins CTRS    Group Therapy Note    Attendees: 7    Date: 4/5/2024  Start Time: 1030  End Time:  1045  Number of Participants: 7    Type of Group: Community Meeting    Was updated on expectations of the unit, staffing, and programming.  Patient shared goal for today as \"Just get through the day, take the medicine.\"    Status After Intervention:  Improved    Participation Level: Active Listener and Interactive    Participation Quality: Appropriate, Attentive, Sharing, and Supportive      Speech:  normal      Thought Process/Content: Logical  Linear      Affective Functioning: Congruent      Mood:  Appropriate      Level of consciousness:  Alert and Attentive      Response to Learning: Able to verbalize current knowledge/experience, Able to verbalize/acknowledge new learning, Able to retain information, Capable of insight, Able to change behavior, and Progressing to goal      Endings: None Reported    Modes of Intervention: Education, Support, Socialization, Exploration, Clarifying, and Problem-solving      Discipline Responsible: Psychoeducational Specialist      Signature:  ANA Mahmood

## 2024-04-05 NOTE — GROUP NOTE
Group Therapy Note    Date: 4/5/2024    Group Start Time: 1045  Group End Time: 1115  Group Topic: Psychoeducation    [facility]    Hallie Hutchins CTRS    Group Therapy Note    Attendees: 7    Date: 4/5/2024  Start Time: 1045  End Time:  1115  Number of Participants: 7    Type of Group: Psychoeducation    Name:  Boredom     Patient's Goal:  ID what is boredom and how it affects mental health. ID positive ways to overcome boredom.     Notes:  CTRS lead educational group discussion on boredom. Encouraged patients to share their experiences. Patient was actively engaged in group discussion.    Status After Intervention:  Improved    Participation Level: Active Listener and Interactive    Participation Quality: Appropriate, Attentive, Sharing, and Supportive      Speech:  normal      Thought Process/Content: Logical  Linear      Affective Functioning: Congruent      Mood:  Appropriate      Level of consciousness:  Alert and Attentive      Response to Learning: Able to verbalize current knowledge/experience, Able to verbalize/acknowledge new learning, Able to retain information, Capable of insight, Able to change behavior, and Progressing to goal      Endings: None Reported    Modes of Intervention: Education, Support, Socialization, Exploration, Clarifying, and Problem-solving      Discipline Responsible: Psychoeducational Specialist      Signature:  ANA Mahmood

## 2024-04-05 NOTE — PLAN OF CARE
Problem: Self Harm/Suicidality  Goal: Will have no self-injury during hospital stay  Description: INTERVENTIONS:  1.  Ensure constant observer at bedside with Q15M safety checks  2.  Maintain a safe environment  3.  Secure patient belongings  4.  Ensure family/visitors adhere to safety recommendations  5.  Ensure safety tray has been added to patient's diet order  6.  Every shift and PRN: Re-assess suicidal risk via Frequent Screener  4/4/2024 2255 by Micaela Farley RN  Outcome: Progressing     Problem: Depression  Goal: Will be euthymic at discharge  Description: INTERVENTIONS:  1. Administer medication as ordered  2. Provide emotional support via 1:1 interaction with staff  3. Encourage involvement in milieu/groups/activities  4. Monitor for social isolation  4/4/2024 2255 by Micaela Farley RN  Outcome: Progressing    Problem: Anxiety  Goal: Will report anxiety at manageable levels  Description: INTERVENTIONS:  1. Administer medication as ordered  2. Teach and rehearse alternative coping skills  3. Provide emotional support with 1:1 interaction with staff  4/4/2024 2255 by Micaela Farley RN  Outcome: Progressing

## 2024-04-05 NOTE — BH NOTE
Patient came up to the NS to report feeling anxious. Also c/o nausea and burning at the top of her abdomen. Patient states that she has a stomach ulcer and \"has never felt this way before\". Ensured patient that she has been receiving scheduled Protonix in the AM. PRN Maalox given for indigestion and nausea. Will continue to monitor.

## 2024-04-05 NOTE — GROUP NOTE
Group Therapy Note    Date: 4/5/2024    Group Start Time: 1410  Group End Time: 1442  Group Topic: Cognitive Skills    [facility]    Guicho Bang MSW        Group Therapy Note    Attendees: 6       Patient's Goal:  To actively participate in group discussion on mindfulness and meditation.    Notes:  Pt was an active participant in group discussion    Status After Intervention:  Improved    Participation Level: Active Listener and Interactive    Participation Quality: Appropriate, Attentive, and Sharing      Speech:  normal      Thought Process/Content: Linear      Affective Functioning: Congruent      Mood: anxious      Level of consciousness:  Alert, Oriented x4, and Attentive      Response to Learning: Able to verbalize current knowledge/experience, Able to verbalize/acknowledge new learning, and Able to retain information      Endings: None Reported    Modes of Intervention: Education, Support, Socialization, Exploration, Clarifying, and Problem-solving      Discipline Responsible: /Counselor      Signature:  DARRON Nava

## 2024-04-05 NOTE — BH NOTE
Patient denies suicidal ideation, homicidal ideations and hallucinations. Appears flat, sad, and anxious. Reports having high anxiety in the morning but states that its gone down to a 4/10 this evening. Patient is not seen out on the unit. Selectively social with her roommate. Medications taken without issue. No complaints or concerns verbalized at this time.  No unit problems reported. Will continue to observe and support.

## 2024-04-06 PROCEDURE — 1240000000 HC EMOTIONAL WELLNESS R&B

## 2024-04-06 PROCEDURE — 6370000000 HC RX 637 (ALT 250 FOR IP): Performed by: NURSE PRACTITIONER

## 2024-04-06 PROCEDURE — 6370000000 HC RX 637 (ALT 250 FOR IP): Performed by: PSYCHIATRY & NEUROLOGY

## 2024-04-06 RX ADMIN — HYDROXYZINE PAMOATE 25 MG: 25 CAPSULE ORAL at 22:32

## 2024-04-06 RX ADMIN — MEMANTINE 5 MG: 5 TABLET ORAL at 08:39

## 2024-04-06 RX ADMIN — MIRTAZAPINE 15 MG: 15 TABLET, FILM COATED ORAL at 22:33

## 2024-04-06 RX ADMIN — HYDROXYZINE PAMOATE 25 MG: 25 CAPSULE ORAL at 07:14

## 2024-04-06 RX ADMIN — Medication 3 MG: at 22:32

## 2024-04-06 RX ADMIN — MAGNESIUM HYDROXIDE 30 ML: 400 SUSPENSION ORAL at 22:32

## 2024-04-06 RX ADMIN — VENLAFAXINE HYDROCHLORIDE 75 MG: 75 CAPSULE, EXTENDED RELEASE ORAL at 08:39

## 2024-04-06 RX ADMIN — MEMANTINE 5 MG: 5 TABLET ORAL at 22:33

## 2024-04-06 RX ADMIN — PANTOPRAZOLE SODIUM 40 MG: 40 TABLET, DELAYED RELEASE ORAL at 08:39

## 2024-04-06 ASSESSMENT — PAIN DESCRIPTION - ONSET: ONSET: GRADUAL

## 2024-04-06 ASSESSMENT — PAIN DESCRIPTION - ORIENTATION: ORIENTATION: LOWER;MID

## 2024-04-06 ASSESSMENT — PAIN SCALES - GENERAL: PAINLEVEL_OUTOF10: 5

## 2024-04-06 ASSESSMENT — PAIN DESCRIPTION - LOCATION: LOCATION: ABDOMEN

## 2024-04-06 ASSESSMENT — PAIN - FUNCTIONAL ASSESSMENT: PAIN_FUNCTIONAL_ASSESSMENT: ACTIVITIES ARE NOT PREVENTED

## 2024-04-06 ASSESSMENT — PAIN DESCRIPTION - DESCRIPTORS: DESCRIPTORS: TIGHTNESS

## 2024-04-06 ASSESSMENT — PAIN DESCRIPTION - FREQUENCY: FREQUENCY: INTERMITTENT

## 2024-04-06 ASSESSMENT — PAIN DESCRIPTION - PAIN TYPE: TYPE: ACUTE PAIN

## 2024-04-06 NOTE — GROUP NOTE
Group Therapy Note    Date: 4/6/2024    Group Start Time: 1515  Group End Time: 1700  Group Topic: Recreational    [facility]    Marianela Simmons, CTRS    Date: 4/6/2024  Start Time: 315        Type of Group: Recreational    Module Name:  cinema therapy    Patient's Goal:  will be able to sit and watch and socialize with others thruout watching comedy.     Notes:  appeared to be an active participate in recreation activity.     Status After Intervention:  Improved    Participation Level: Active Listener and Interactive    Participation Quality: Appropriate and Attentive      Speech:  normal      Thought Process/Content: Logical      Affective Functioning: Congruent      Mood: euthymic      Level of consciousness:  Alert, Oriented x4, and Attentive      Response to Learning: Able to verbalize/acknowledge new learning and Able to retain information      Endings: None Reported    Modes of Intervention: Support, Socialization, and Media      Discipline Responsible: Psychoeducational Specialist      Signature:  Marianela Simmons CTRS

## 2024-04-06 NOTE — BH NOTE
[facility]  Initial Interdisciplinary Treatment Plan NOTE    Review Date & Time: 4/6/2024 1000    Patient was in treatment team    Admission Type:   Admission Type: Involuntary    Reason for admission:  Reason for Admission: \"Mainly I am here to hopefully get on medications that suit me\"      Estimated Length of Stay Update:  3-5 days  Estimated Discharge Date Update: 4/11/2024    EDUCATION:   Learner Progress Toward Treatment Goals: Reviewed results and recommendations of this team, Reviewed group plan and strategies, Reviewed signs, symptoms and risk of self harm and violent behavior, and Reviewed goals and plan of care    Method: Small group    Outcome: Verbalized understanding    PATIENT GOALS: \"Try to read.\"    PLAN/TREATMENT RECOMMENDATIONS UPDATE:Encourage patient to attend and participate in groups and take medications as prescribed.       GOALS UPDATE:   Time frame for Short-Term Goals: 3 days    Elvi Underwood RN

## 2024-04-06 NOTE — GROUP NOTE
Shared goal for the day as to try to read.                                                                       Group Therapy Note    Date: 4/6/2024    Group Start Time: 1400  Group End Time: 1415  Group Topic: Community Meeting    [facility]    Marianela Simmons, CTRS    Date: 4/6/2024  Module Name:  community meeting     Patient's Goal:  pt will be able to id staffing assignments, routine of the day and will be asked to set goal for the day.     Notes:  observed to be an active listener and willing to set goal for the day.     Status After Intervention:  Improved    Participation Level: Active Listener and Interactive    Participation Quality: Appropriate, Attentive, and Sharing      Speech:  normal      Thought Process/Content: Logical      Affective Functioning: Congruent      Mood: euthymic      Level of consciousness:  Alert, Oriented x4, and Attentive      Response to Learning: Able to verbalize/acknowledge new learning, Able to retain information, and Progressing to goal      Endings: None Reported    Modes of Intervention: Support, Socialization, and Clarifying      Discipline Responsible: Psychoeducational Specialist      Signature:  Marianela Simmons CTRS

## 2024-04-06 NOTE — GROUP NOTE
Group Therapy Note    Date: 4/6/2024    Group Start Time: 0935  Group End Time: 1015  Group Topic: Cognitive Skills    [facility]    Devora Waggoner, DARRON, STEPHANIA        Group Therapy Note    Attendees: 9       Patient's Goal:  Pt attended group therapy where we discussed personal boundaries - the different styles and types of boundaries.    Notes:  Pt was an active participant in group therapy for the first five minutes and then left.     Status After Intervention:  Unchanged    Participation Level: Minimal    Participation Quality: Resistant      Speech:  mute      Thought Process/Content: Logical      Affective Functioning: Congruent      Mood: euthymic      Level of consciousness:  Inattentive      Response to Learning: Resistant      Endings: None Reported    Modes of Intervention: Education, Support, Socialization, Exploration, Clarifying, and Problem-solving      Discipline Responsible: /Counselor      Signature:  DARRON Piper, STEPHANIA

## 2024-04-06 NOTE — GROUP NOTE
Group Therapy Note    Date: 4/6/2024    Group Start Time: 1415  Group End Time: 1500  Group Topic: Psychoeducation    [facility]    Marianela Simmons, ANA    Date: 4/6/2024  Start Time: 215  End Time:  300  Number of Participants: 9    Type of Group: Psychoeducation    Wellness Binder Information  Module Name:  being an active listener     Patient's Goal:  pt will be able to learn key ways to be a good listener and tell someone how to listen to them and indicate his/her needs.     Notes:  pleasant and sharing appropriately, able to contribute when prompted.     Status After Intervention:  Improved    Participation Level: Active Listener and Interactive    Participation Quality: Appropriate, Attentive, and Sharing      Speech:  normal      Thought Process/Content: Logical      Affective Functioning: Congruent      Mood: euthymic      Level of consciousness:  Alert, Oriented x4, and Attentive      Response to Learning: Able to verbalize/acknowledge new learning, Able to retain information, and Progressing to goal      Endings: None Reported    Modes of Intervention: Education, Support, Socialization, and Clarifying      Discipline Responsible: Psychoeducational Specialist      Signature:  SAADIA BolivarS

## 2024-04-06 NOTE — PLAN OF CARE
Patient cooperative during assessment. Denies SI/HI/AVH. Endorses anxiety that fluctuates during the day but \"seems to get worse after taking Effexor in the morning.\" Reports \"the depression is better than the anxiety.\" \"The anxiety makes me want to hurt myself.\" Patient is agreeable to safety plan. Appears flat, depressed, worried, and severely anxious. Patient offered emotional support and encouraged to use coping skills.  Patient is out on the unit and minimally social. Reports adequate sleep and decreased appetite, reports \"forcing\" herself to eat. Patient is taking medications and attending groups. Will continue to monitor and intervene as needed.     Problem: Self Harm/Suicidality  Goal: Will have no self-injury during hospital stay  Description: INTERVENTIONS:  1.  Ensure constant observer at bedside with Q15M safety checks  2.  Maintain a safe environment  3.  Secure patient belongings  4.  Ensure family/visitors adhere to safety recommendations  5.  Ensure safety tray has been added to patient's diet order  6.  Every shift and PRN: Re-assess suicidal risk via Frequent Screener    4/6/2024 1512 by Elvi Underwood RN  Outcome: Progressing     Problem: Depression  Goal: Will be euthymic at discharge  Description: INTERVENTIONS:  1. Administer medication as ordered  2. Provide emotional support via 1:1 interaction with staff  3. Encourage involvement in milieu/groups/activities  4. Monitor for social isolation  4/6/2024 1512 by Elvi Underwood, RN  Outcome: Progressing     Problem: Involuntary Admit  Goal: Will cooperate with staff recommendations and doctor's orders and will demonstrate appropriate behavior  Description: INTERVENTIONS:  1. Treat underlying conditions and offer medication as ordered  2. Educate regarding involuntary admission procedures and rules  3. Contain excessive/inappropriate behavior per unit and hospital policies  4/6/2024 1512 by Elvi Underwood, RN  Outcome: Progressing     Problem:

## 2024-04-06 NOTE — PLAN OF CARE
Problem: Depression  Goal: Will be euthymic at discharge  Description: INTERVENTIONS:  1. Administer medication as ordered  2. Provide emotional support via 1:1 interaction with staff  3. Encourage involvement in milieu/groups/activities  4. Monitor for social isolation  Outcome: Not Progressing     Problem: Anxiety  Goal: Will report anxiety at manageable levels  Description: INTERVENTIONS:  1. Administer medication as ordered  2. Teach and rehearse alternative coping skills  3. Provide emotional support with 1:1 interaction with staff  Outcome: Not Progressing

## 2024-04-07 PROCEDURE — 6370000000 HC RX 637 (ALT 250 FOR IP): Performed by: NURSE PRACTITIONER

## 2024-04-07 PROCEDURE — 6370000000 HC RX 637 (ALT 250 FOR IP): Performed by: PSYCHIATRY & NEUROLOGY

## 2024-04-07 PROCEDURE — 99232 SBSQ HOSP IP/OBS MODERATE 35: CPT | Performed by: NURSE PRACTITIONER

## 2024-04-07 PROCEDURE — 1240000000 HC EMOTIONAL WELLNESS R&B

## 2024-04-07 RX ADMIN — MEMANTINE 5 MG: 5 TABLET ORAL at 09:05

## 2024-04-07 RX ADMIN — MIRTAZAPINE 15 MG: 15 TABLET, FILM COATED ORAL at 21:48

## 2024-04-07 RX ADMIN — PANTOPRAZOLE SODIUM 40 MG: 40 TABLET, DELAYED RELEASE ORAL at 09:05

## 2024-04-07 RX ADMIN — VENLAFAXINE HYDROCHLORIDE 75 MG: 75 CAPSULE, EXTENDED RELEASE ORAL at 09:05

## 2024-04-07 RX ADMIN — HYDROXYZINE PAMOATE 25 MG: 25 CAPSULE ORAL at 21:48

## 2024-04-07 RX ADMIN — HYDROXYZINE PAMOATE 25 MG: 25 CAPSULE ORAL at 07:09

## 2024-04-07 RX ADMIN — MEMANTINE 5 MG: 5 TABLET ORAL at 21:48

## 2024-04-07 RX ADMIN — Medication 3 MG: at 21:48

## 2024-04-07 NOTE — GROUP NOTE
Group Therapy Note    Date: 4/7/2024    Group Start Time: 1110  Group End Time: 1145  Group Topic: Psychoeducation    [facility]    Marianela Simmons, CTRS    Date: 4/7/2024      Module Name:  anxiety and the what ifs?    Patient's Goal:  Patient will be able to id positive ways to turn the what ifs into positive ones. Will be asked to identify physical affects to anxiety.     Notes:  pleasant and engaged in group, able to participate and accepting of handout,     Status After Intervention:  Improved    Participation Level: Active Listener and Interactive    Participation Quality: Appropriate, Attentive, and Sharing      Speech:  normal      Thought Process/Content: Logical      Affective Functioning: Congruent      Mood: euphoric      Level of consciousness:  Alert, Oriented x4, and Attentive      Response to Learning: Able to verbalize/acknowledge new learning, Able to retain information, and Progressing to goal      Endings: None Reported    Modes of Intervention: Education, Support, Socialization, and Clarifying      Discipline Responsible: Psychoeducational Specialist      Signature:  Marianela Simmons CTRS

## 2024-04-07 NOTE — PLAN OF CARE
Problem: Self Harm/Suicidality  Goal: Will have no self-injury during hospital stay  Description: INTERVENTIONS:  1.  Ensure constant observer at bedside with Q15M safety checks  2.  Maintain a safe environment  3.  Secure patient belongings  4.  Ensure family/visitors adhere to safety recommendations  5.  Ensure safety tray has been added to patient's diet order  6.  Every shift and PRN: Re-assess suicidal risk via Frequent Screener    4/7/2024 0237 by Marya Anderson RN  Outcome: Progressing     Problem: Depression  Goal: Will be euthymic at discharge  Description: INTERVENTIONS:  1. Administer medication as ordered  2. Provide emotional support via 1:1 interaction with staff  3. Encourage involvement in milieu/groups/activities  4. Monitor for social isolation  4/7/2024 0237 by Marya Anderson RN  Outcome: Progressing     Problem: Anxiety  Goal: Will report anxiety at manageable levels  Description: INTERVENTIONS:  1. Administer medication as ordered  2. Teach and rehearse alternative coping skills  3. Provide emotional support with 1:1 interaction with staff  4/7/2024 0237 by Marya Anderson RN  Outcome: Progressing     Problem: Involuntary Admit  Goal: Will cooperate with staff recommendations and doctor's orders and will demonstrate appropriate behavior  Description: INTERVENTIONS:  1. Treat underlying conditions and offer medication as ordered  2. Educate regarding involuntary admission procedures and rules  3. Contain excessive/inappropriate behavior per unit and hospital policies  4/7/2024 0237 by Marya Anderson RN  Outcome: Progressing     Problem: Risk for Elopement  Goal: Patient will not exit the unit/facility without proper excort  4/7/2024 0237 by Marya Anderson RN  Outcome: Progressing     Problem: Anxiety  Goal: Will report anxiety at manageable levels  Description: INTERVENTIONS:  1. Administer medication as ordered  2. Teach and rehearse alternative coping skills  3. Provide emotional

## 2024-04-07 NOTE — PLAN OF CARE
Denies SI/HI  denies hallucinations mood is improved  affect is brighter  states her meds are helping and feels she is ready for discharge   cooperative with meds  attending groups   will continue to monitor

## 2024-04-07 NOTE — GROUP NOTE
Group Therapy Note    Date: 4/7/2024    Group Start Time: 0935  Group End Time: 1025  Group Topic: Cognitive Skills    [facility]    Devora Waggoner, STEPHANIA ROB        Group Therapy Note    Attendees: 7       Patient's Goal:  Pt attended group therapy where we discussed \"Fair Fighting Rules.\"    Notes:  Pt was an active participant in group therapy.    Status After Intervention:  Improved    Participation Level: Active Listener and Interactive    Participation Quality: Appropriate, Attentive, and Sharing      Speech:  normal      Thought Process/Content: Logical      Affective Functioning: Congruent      Mood: euthymic      Level of consciousness:  Alert, Oriented x4, and Attentive      Response to Learning: Able to verbalize current knowledge/experience, Able to verbalize/acknowledge new learning, and Able to retain information      Endings: None Reported    Modes of Intervention: Education, Support, Socialization, Exploration, Clarifying, and Problem-solving      Discipline Responsible: /Counselor      Signature:  DARRON Piper, STEPHANIA

## 2024-04-07 NOTE — GROUP NOTE
Shared goal for the day as to remain as calm as possible.                                                                       Group Therapy Note    Date: 4/7/2024    Group Start Time: 1050  Group End Time: 1110  Group Topic: Community Meeting    [facility]    Marianela Simmons CTRS    Type of Group: Community Meeting      Patient's Goal:  Patient will be able to id staffing assignments, expectations of patients, and general information re: floor rules. Will be prompted to share goal for the day.     Notes:  Patient appeared to be an active listener, taking in information presented and was prompted to share goal for the day.    Status After Intervention:  Improved    Participation Level: Active Listener and Interactive    Participation Quality: Appropriate and Attentive      Speech:  normal      Thought Process/Content: Logical      Affective Functioning: Congruent      Mood: euthymic      Level of consciousness:  Alert and Oriented x4      Response to Learning: Able to verbalize/acknowledge new learning      Endings: None Reported    Modes of Intervention: Education, Support, and Clarifying      Discipline Responsible: Psychoeducational Specialist      Signature:  ANA Bolivar

## 2024-04-07 NOTE — BH NOTE
Patient denies suicidal ideation, homicidal ideations and AVH. Reports anxiety 1/10 and depression 0/10. Flat affect that brightens with conversation. Presents calm and cooperative during assessment.  Patient is out on the unit and is social with peers.  Medications taken without issue.  No complaints or concerns verbalized at this time.  No unit problems reported.  Will continue to observe and support.

## 2024-04-08 VITALS
OXYGEN SATURATION: 98 % | TEMPERATURE: 97.8 F | SYSTOLIC BLOOD PRESSURE: 118 MMHG | HEART RATE: 89 BPM | HEIGHT: 63 IN | RESPIRATION RATE: 16 BRPM | BODY MASS INDEX: 26.33 KG/M2 | DIASTOLIC BLOOD PRESSURE: 73 MMHG | WEIGHT: 148.6 LBS

## 2024-04-08 PROCEDURE — 99239 HOSP IP/OBS DSCHRG MGMT >30: CPT | Performed by: NURSE PRACTITIONER

## 2024-04-08 PROCEDURE — 6370000000 HC RX 637 (ALT 250 FOR IP): Performed by: NURSE PRACTITIONER

## 2024-04-08 RX ORDER — MEMANTINE HYDROCHLORIDE 5 MG/1
5 TABLET ORAL 2 TIMES DAILY
Qty: 60 TABLET | Refills: 0 | Status: SHIPPED | OUTPATIENT
Start: 2024-04-08 | End: 2024-05-08

## 2024-04-08 RX ORDER — VENLAFAXINE HYDROCHLORIDE 75 MG/1
75 CAPSULE, EXTENDED RELEASE ORAL
Qty: 30 CAPSULE | Refills: 0 | Status: SHIPPED | OUTPATIENT
Start: 2024-04-08 | End: 2024-05-08

## 2024-04-08 RX ORDER — MIRTAZAPINE 15 MG/1
15 TABLET, FILM COATED ORAL NIGHTLY
Qty: 30 TABLET | Refills: 3 | Status: SHIPPED | OUTPATIENT
Start: 2024-04-08

## 2024-04-08 RX ORDER — LANOLIN ALCOHOL/MO/W.PET/CERES
3 CREAM (GRAM) TOPICAL NIGHTLY PRN
Refills: 0 | COMMUNITY
Start: 2024-04-08

## 2024-04-08 RX ORDER — HYDROXYZINE PAMOATE 25 MG/1
25 CAPSULE ORAL 3 TIMES DAILY PRN
Qty: 42 CAPSULE | Refills: 0 | Status: SHIPPED | OUTPATIENT
Start: 2024-04-08 | End: 2024-04-22

## 2024-04-08 RX ADMIN — VENLAFAXINE HYDROCHLORIDE 75 MG: 75 CAPSULE, EXTENDED RELEASE ORAL at 08:46

## 2024-04-08 RX ADMIN — PANTOPRAZOLE SODIUM 40 MG: 40 TABLET, DELAYED RELEASE ORAL at 08:47

## 2024-04-08 RX ADMIN — MEMANTINE 5 MG: 5 TABLET ORAL at 08:46

## 2024-04-08 NOTE — GROUP NOTE
Group Therapy Note    Date: 4/8/2024    Group Start Time: 1045  Group End Time: 1115  Group Topic: Psychoeducation    [facility]    Hallie Hutchins CTRS    Group Therapy Note    Attendees: 11    Date: 4/8/2024  Start Time: 1045  End Time:  1115  Number of Participants: 11    Type of Group: Psychoeducation    Name:  Self-Esteem     Patient's Goal:  ID what is self-esteem, influences of self-esteem and how self-esteem can be improved/maintained.     Notes:  CT[facility] lead educational group discussion on self-esteem. Encouraged patients to share their experiences. Patient was actively engaged in group discussion.    Status After Intervention:  Improved    Participation Level: Active Listener and Interactive    Participation Quality: Appropriate, Attentive, Sharing, and Supportive      Speech:  normal      Thought Process/Content: Logical  Linear      Affective Functioning: Congruent      Mood:  Appropriate      Level of consciousness:  Alert and Attentive      Response to Learning: Able to verbalize current knowledge/experience, Able to verbalize/acknowledge new learning, Able to retain information, Capable of insight, Able to change behavior, and Progressing to goal      Endings: None Reported    Modes of Intervention: Education, Support, Socialization, Exploration, Clarifying, and Problem-solving      Discipline Responsible: Psychoeducational Specialist      Signature:  ANA Mahmood

## 2024-04-08 NOTE — TRANSITION OF CARE
Behavioral Health Transition Record to Provider    Patient Name: Kerri Woo  YOB: 1950   Medical Record Number: [id number]  Date of Admission: 4/1/2024  8:00 PM   Date of Discharge: 4/8/24    Attending Provider: Priscilla Medrano MD   Discharging Provider: Dr Medrano  To contact this individual call [phone] and ask the  to page.  If unavailable, ask to be transferred to Behavioral Health Provider on call.  A Behavioral Health Provider will be available on call 24/7 and during holidays.    Primary Care Provider: Castro Boles Jr., MD    Allergies   Allergen Reactions    Betadine [Povidone Iodine]        Reason for Admission: CIRCUMSTANCES OF ADMISSION: Kerri Woo is a 73-year-old female with a past psychiatric history of major depressive disorder and presented to the ED due to increasing suicidal thoughts and depression. She was placed on an involuntary hold by the ED physician due to suicidal ideation with plan to drown self in bathtub. Precipitating factors include switching her antidepressant medications 6 days ago. Duration of depressive symptoms has been a few months with acute worsening of suicidal ideation over the last 4-5 day     Admission Diagnosis: Suicidal ideation [R45.851]  Major depression, single episode [F32.9]    * No surgery found *    Results for orders placed or performed during the hospital encounter of 04/01/24   CBC with Auto Differential   Result Value Ref Range    WBC 8.2 4.5 - 11.5 k/uL    RBC 4.67 3.50 - 5.50 m/uL    Hemoglobin 14.6 11.5 - 15.5 g/dL    Hematocrit 42.9 34.0 - 48.0 %    MCV 91.9 80.0 - 99.9 fL    MCH 31.3 26.0 - 35.0 pg    MCHC 34.0 32.0 - 34.5 g/dL    RDW 12.9 11.5 - 15.0 %    Platelets 193 130 - 450 k/uL    MPV 11.6 7.0 - 12.0 fL    Neutrophils % 66 43.0 - 80.0 %    Lymphocytes % 24 20.0 - 42.0 %    Monocytes % 9 2.0 - 12.0 %    Eosinophils % 0 0 - 6 %    Basophils % 1 0.0 - 2.0 %    Immature Granulocytes % 0 0.0 - 5.0 %

## 2024-04-08 NOTE — DISCHARGE SUMMARY
true     Ran Out of Food in the Last Year: Never true   Transportation Needs: No Transportation Needs (4/2/2024)    PRAPARE - Transportation     Lack of Transportation (Medical): No     Lack of Transportation (Non-Medical): No   Physical Activity: Not on file   Stress: Not on file   Social Connections: Not on file   Intimate Partner Violence: Not on file   Housing Stability: Low Risk  (4/2/2024)    Housing Stability Vital Sign     Unable to Pay for Housing in the Last Year: No     Number of Places Lived in the Last Year: 1     Unstable Housing in the Last Year: No       MEDICATIONS:    Current Facility-Administered Medications:     venlafaxine (EFFEXOR XR) extended release capsule 75 mg, 75 mg, Oral, Daily with breakfast, Cary Heath APRN - CNP, 75 mg at 04/07/24 0905    calcium carbonate (TUMS) chewable tablet 500 mg, 500 mg, Oral, TID PRN, Cary Heath APRN - CNP    memantine (NAMENDA) tablet 5 mg, 5 mg, Oral, BID, Cary Heath APRN - CNP, 5 mg at 04/07/24 2148    mirtazapine (REMERON) tablet 15 mg, 15 mg, Oral, Nightly, Cary Heath APRN - CNP, 15 mg at 04/07/24 2148    acetaminophen (TYLENOL) tablet 650 mg, 650 mg, Oral, Q4H PRN, Priscilla Medrano MD    magnesium hydroxide (MILK OF MAGNESIA) 400 MG/5ML suspension 30 mL, 30 mL, Oral, Daily PRN, Priscilla Medrano MD, 30 mL at 04/06/24 2232    nicotine (NICODERM CQ) 21 MG/24HR 1 patch, 1 patch, TransDERmal, Daily, Priscilla Medrano MD    aluminum & magnesium hydroxide-simethicone (MAALOX) 200-200-20 MG/5ML suspension 30 mL, 30 mL, Oral, PRN, Priscilla Medrano MD, 30 mL at 04/05/24 0954    hydrOXYzine pamoate (VISTARIL) capsule 25 mg, 25 mg, Oral, TID PRN, Priscilla Medrano MD, 25 mg at 04/07/24 2148    haloperidol (HALDOL) tablet 3 mg, 3 mg, Oral, Q6H PRN **OR** haloperidol lactate (HALDOL) injection 3 mg, 3 mg, IntraMUSCular, Q6H PRN, Priscilla Medrano MD    melatonin tablet 3 mg, 3 mg, Oral, Nightly PRN, Priscilla Medrano MD, 3 mg at

## 2024-04-08 NOTE — PROGRESS NOTES
[facility]  Admission Note     72yo female admitted from main ED. A&Ox4. Calm and cooperative during assessment. Affect is sad and mood is depressed. Answered assessment questions clearly and appropriately. Shows good insight. Patient reports that she had been taking Cymbalta for 20 years and \"it started to reverse and I was getting more depressed\". Her doctor started her on another medication that she cannot remember the name of, that did not work. Most recently (6 days ago), she started taking Zoloft which is when she began having suicidal ideations. She states that she had many plans, but never actually attempted. She spoke with her  and called her doctor, leading her to come to the ER for evaluation. Currently, patient denies suicidal ideations. Denies homicidal ideations and hallucinations. Patient was shown unit and room. Purposeful rounding continued.    Admission Type:   Admission Type: Involuntary    Reason for admission:  Reason for Admission: \"Mainly I am here to hopefully get on medications that suit me\"      Addictive Behavior:   Addictive Behavior  In the Past 3 Months, Have You Felt or Has Someone Told You That You Have a Problem With  : None    Medical Problems:   Past Medical History:   Diagnosis Date    Depression     Post herpetic neuralgia        Status EXAM:  Mental Status and Behavioral Exam  Normal: No  Level of Assistance: Independent/Self  Facial Expression: Sad, Flat, Worried  Affect: Congruent  Level of Consciousness: Alert  Frequency of Checks: 4 times per hour, close  Mood:Normal: No  Mood: Depressed, Anxious, Sad  Motor Activity:Normal: Yes  Eye Contact: Good  Observed Behavior: Cooperative  Sexual Misconduct History: Current - no  Preception: [place] to person, [place] to time, [place] to place, [place] to situation  Attention:Normal: Yes  Thought Processes: Unremarkable  Thought Content:Normal: No  Thought Content: Other (comment) (clear)  Depression Symptoms: 
4 Eyes Skin Assessment     NAME:  Kerri Woo  YOB: 1950  MEDICAL RECORD NUMBER:  [id number]    The patient is being assessed for  Admission    I agree that at least one RN has performed a thorough Head to Toe Skin Assessment on the patient. ALL assessment sites listed below have been assessed.      Areas assessed by both nurses:    Head, Face, Ears, Shoulders, Back, Chest, Arms, Elbows, Hands, Sacrum. Buttock, Coccyx, Ischium, and Legs. Feet and Heels        Does the Patient have a Wound? No noted wound(s)       David Prevention initiated by RN: Yes  Wound Care Orders initiated by RN: No    Pressure Injury (Stage 3,4, Unstageable, DTI, NWPT, and Complex wounds) if present, place Wound referral order by RN under : No    New Ostomies, if present place, Ostomy referral order under : No     Nurse 1 eSignature: Electronically signed by Nohemy Mata RN on 4/2/24 at 4:07 AM EDT    **SHARE this note so that the co-signing nurse can place an eSignature**    Nurse 2 eSignature: Electronically signed by Marya Anderson RN on 4/2/24 at 6:00 AM EDT  
BEHAVIORAL HEALTH FOLLOW-UP NOTE     4/3/2024     Patient was seen and examined in person, Chart reviewed   Patient's case discussed with staff/team    Chief Complaint: \" I think I feel worse today\"    Interim History:     Kerri is in her room this morning she is resting in her bed.  She is very anxious appears depressed and sad.  She states that she feels worse today than she did yesterday.  She states that her stomach has been upset since she started the medication, when asked if it was the medication in the morning or the medication at night that caused her stomach discomfort she states \"I do not know, I am confused\" she demonstrates a negative thought process and has stated that she does not believe the medications will work.  She is unable to describe to me what anxiety feels like, she is also unable to describe what depression feels like.  When asked if she is having too many thoughts in her head, or racing thoughts she states that she does not, but then goes on to state that she is very anxious.  When asked if she is thinking about things or worrying about things she tells me that she has not.  She states that she cannot continue to go on feeling this way.  Patient has taken 1 dose of her Effexor at this point she will receive her morning dose again today.  It is too early in treatment to determine if the medication is going to work for her or not.  She is continuing to present with hopelessness helplessness negative thought process not believing that anything is going to help her feel better.  I get the impression after talking to the patient that she expected an immediate improvement in her depression and anxiety, she is counseled that she has still on the medications time to work.        Appetite:   [x] Normal/Unchanged  [] Increased  [] Decreased      Sleep:       [] Normal/Unchanged  [] Fair       [x] Poor              Energy:    [] Normal/Unchanged  [] Increased  [x] Decreased        SI [] Present  
BEHAVIORAL HEALTH FOLLOW-UP NOTE     4/4/2024     Patient was seen and examined in person, Chart reviewed   Patient's case discussed with staff/team    Chief Complaint: \" I feel anxious\"    Interim History:     Kerri is in her room this this afternoon she tells me again that she feels anxious.  She looks less anxious and preoccupied than she did on encounter yesterday.  She is not tearful during this encounter.  We discussed the score of her MoCA and starting Namenda and how this medication may affect her.  In conversation today it was made pretty clear that the patient expected the medications to work as soon as she took them, we did discuss that the medications take time to work and she will have to be patient with this.  She has no objections to the Namenda or continuing the medications that were started for her by the medical director.  She states that her sleep was fair.  She is taking her medications.  She has been attending groups as tolerated.  Denying suicidal ideation intent or plan      Appetite:   [x] Normal/Unchanged  [] Increased  [] Decreased      Sleep:       [] Normal/Unchanged  [x] Fair       [] Poor              Energy:    [] Normal/Unchanged  [] Increased  [x] Decreased        SI [] Present  [x] Absent    HI  []Present  [x] Absent     Aggression:  [] yes  [x] no    Patient is [x] able  [] unable to CONTRACT FOR SAFETY     PAST MEDICAL/PSYCHIATRIC HISTORY:   Past Medical History:   Diagnosis Date    Depression     Post herpetic neuralgia        FAMILY/SOCIAL HISTORY:  History reviewed. No pertinent family history.  Social History     Socioeconomic History    Marital status:      Spouse name: Not on file    Number of children: Not on file    Years of education: Not on file    Highest education level: Not on file   Occupational History    Not on file   Tobacco Use    Smoking status: Never    Smokeless tobacco: Never   Vaping Use    Vaping Use: Never used   Substance and Sexual Activity    
BEHAVIORAL HEALTH FOLLOW-UP NOTE     4/5/2024     Patient was seen and examined in person, Chart reviewed   Patient's case discussed with staff/team    Chief Complaint: \" I am feeling a little better\"    Interim History:     Kerri is seen in treatment team this morning.  She is brighter, less anxious she is making good eye contact.  States that she is feeling a little better.  She is tolerating the Namenda well she understands the use of the Namenda given her cognitive decline.  She states that the medications are starting to work and she is feeling less anxious.  She denies any suicidal ideation intent or plan.  She is feeling more optimistic.  She states that her sleep was fair, except for some stomach pain.  She is taking her medications.  She has been attending groups as tolerated.        Appetite:   [x] Normal/Unchanged  [] Increased  [] Decreased      Sleep:       [] Normal/Unchanged  [x] Fair       [] Poor              Energy:    [] Normal/Unchanged  [] Increased  [x] Decreased        SI [] Present  [x] Absent    HI  []Present  [x] Absent     Aggression:  [] yes  [x] no    Patient is [x] able  [] unable to CONTRACT FOR SAFETY     PAST MEDICAL/PSYCHIATRIC HISTORY:   Past Medical History:   Diagnosis Date    Depression     Post herpetic neuralgia        FAMILY/SOCIAL HISTORY:  History reviewed. No pertinent family history.  Social History     Socioeconomic History    Marital status:      Spouse name: Not on file    Number of children: Not on file    Years of education: Not on file    Highest education level: Not on file   Occupational History    Not on file   Tobacco Use    Smoking status: Never    Smokeless tobacco: Never   Vaping Use    Vaping Use: Never used   Substance and Sexual Activity    Alcohol use: Not Currently     Comment: occ    Drug use: Never    Sexual activity: Not on file   Other Topics Concern    Not on file   Social History Narrative    Not on file     Social Determinants of Health 
BEHAVIORAL HEALTH FOLLOW-UP NOTE     4/7/2024     Patient was seen and examined in person, Chart reviewed   Patient's case discussed with staff/team    Chief Complaint: \" I felt the best after I took the Effexor yesterday\"    Interim History:   I saw patient's morning in her room she states that she is still having some anxiety this morning and needed to take the Vistaril but she states that she feels better after taking the Effexor which is not what nursing staff had reported yesterday.  Patient states that she had the best day she has had in a long time yesterday after getting her Effexor.  She states the only problem is that she has to wait for it in the morning so her anxiety starts to build up in the morning.  She states that getting the Vistaril helps her however.  She denies suicidal ideations intent or plan denies auditory or visual hallucinations states yesterday was \"the best day ever.\"  She is voicing readiness for discharge and asking if she can have the as needed Vistaril to go home with her as well.    Appetite:   [x] Normal/Unchanged  [] Increased  [] Decreased      Sleep:       [] Normal/Unchanged  [x] Fair       [] Poor              Energy:    [] Normal/Unchanged  [] Increased  [x] Decreased        SI [] Present  [x] Absent    HI  []Present  [x] Absent     Aggression:  [] yes  [x] no    Patient is [x] able  [] unable to CONTRACT FOR SAFETY     PAST MEDICAL/PSYCHIATRIC HISTORY:   Past Medical History:   Diagnosis Date    Depression     Post herpetic neuralgia        FAMILY/SOCIAL HISTORY:  History reviewed. No pertinent family history.  Social History     Socioeconomic History    Marital status:      Spouse name: Not on file    Number of children: Not on file    Years of education: Not on file    Highest education level: Not on file   Occupational History    Not on file   Tobacco Use    Smoking status: Never    Smokeless tobacco: Never   Vaping Use    Vaping Use: Never used   Substance and 
[facility]  Discharge Note    Pt discharged with followings belongings:   Dental Appliances: Partials, Uppers  Vision - Corrective Lenses: Eyeglasses  Hearing Aid: None, At home   Valuables returned to patient. Patient educated on aftercare instructions:   .Patient verbalize understanding of AVS:  .    Status EXAM upon discharge:  Mental Status and Behavioral Exam  Normal: No (resting quietly in bed with eyes closed, unable to assess.)  Level of Assistance: Independent/Self  Facial Expression: Brightened, Worried  Affect: Congruent  Level of Consciousness: Alert  Frequency of Checks: 4 times per hour, close  Mood:Normal: No  Mood: Anxious  Motor Activity:Normal: Yes  Motor Activity: Other (comment) (WNL)  Eye Contact: Good  Observed Behavior: Cooperative, Friendly  Sexual Misconduct History: Current - no  Preception: [place] to person, [place] to time, [place] to place, [place] to situation  Attention:Normal: Yes  Attention: Distractible  Thought Processes: Circumstantial  Thought Content:Normal: No  Thought Content: Preoccupations  Depression Symptoms: Impaired concentration, Loss of interest  Anxiety Symptoms: Generalized  [place] Symptoms: No problems reported or observed.  Hallucinations: None  Delusions: No  Memory:Normal: Yes  Memory: Other (comment) (WNL)  Insight and Judgment: No  Insight and Judgment: Poor judgment, Poor insight    Tobacco Screening:  Practical Counseling, on admission, dotty X, if applicable and completed (first 3 are required if patient doesn't refuse):            ( ) Recognizing danger situations (included triggers and roadblocks)                    ( ) Coping skills (new ways to manage stress,relaxation techniques, changing routine, distraction)                                                           ( ) Basic information about quitting (benefits of quitting, techniques in how to quit, available resources  ( ) [facility]         
Leisure assessment completed.   
MOCA 21/30  
Patient approaches desk with complaints high anxiety. Received prn medication see MAR  
Patient resting quietly with eyes closed. No S/S of distress noted or observed. Prn medications vistaril , Melatonin given at this time. Will continue to monitor, provide needs and safe environment with continue rounding every 15 minutes.  
Patient was out on unit social with select peers. Alert and orientated x 4.  Denies suicidal,homicidal or auditory,visual hallucinations. Verbalizes anxious for unknown reason.  Depressed do to has struggled with for 25 years.  Verbalizes that she makes herself eat.  Verbalizes sleep is good with the medications taking.  States that since admission depression and anxiety have improved some, but not completely.  Attended group.  Compliant with evening medications.  Q 15 minute safety rounds continue.       
Current  Energy (kcal/day): 23-25g/tnoGEU=1210-2131  Weight Used for Protein Requirements: Ideal  Protein (g/day): 1.3-1.5g/kgxIBW=65-80g  Method Used for Fluid Requirements: 1 ml/kcal  Fluid (ml/day): 5812-4751    Nutrition Diagnosis:   Inadequate oral intake related to psychological cause or life stress as evidenced by poor intake prior to admission    Nutrition Interventions:   Food and/or Nutrient Delivery: Continue Current Diet, Continue Oral Nutrition Supplement (Ensure TID)  Nutrition Education/Counseling: Education not indicated  Coordination of Nutrition Care: Continue to monitor while inpatient       Goals:     Goals: PO intake 50% or greater, by next RD assessment       Nutrition Monitoring and Evaluation:   Behavioral-Environmental Outcomes: None Identified  Food/Nutrient Intake Outcomes: Food and Nutrient Intake, Supplement Intake  Physical Signs/Symptoms Outcomes: Biochemical Data, Nutrition Focused Physical Findings, Chewing or Swallowing, Skin, Weight, GI Status, Fluid Status or Edema    Discharge Planning:    Too soon to determine     Lucero Martin RD, LD  Contact: [phone]    
mL, Oral, PRN, Priscilla Medrano MD, 30 mL at 04/05/24 0954    hydrOXYzine pamoate (VISTARIL) capsule 25 mg, 25 mg, Oral, TID PRN, Priscilla Medrano MD, 25 mg at 04/06/24 0714    haloperidol (HALDOL) tablet 3 mg, 3 mg, Oral, Q6H PRN **OR** haloperidol lactate (HALDOL) injection 3 mg, 3 mg, IntraMUSCular, Q6H PRN, Priscilla Medrano MD    melatonin tablet 3 mg, 3 mg, Oral, Nightly PRN, Priscilla Medrano MD, 3 mg at 04/05/24 2235    pantoprazole (PROTONIX) tablet 40 mg, 40 mg, Oral, Daily, Cary Heath APRN - CNP, 40 mg at 04/06/24 0839      Examination:  BP (!) 141/72   Pulse 66   Temp 97.2 °F (36.2 °C) (Temporal)   Resp 16   Ht 1.6 m (5' 2.99\")   Wt 67.4 kg (148 lb 9.6 oz)   SpO2 99%   BMI 26.33 kg/m²   Gait - steady  Medication side effects(SE): None reported    Mental Status Examination:    Level of consciousness:  within normal limits   Appearance:  fair grooming and fair hygiene  Behavior/Motor:  no abnormalities noted  Attitude toward examiner:  cooperative  Speech:  spontaneous, normal rate and normal volume  Mood: \" I am feeling better\"  Affect: Calm pleasant congruent with stated mood  Thought processes: Linear without  flight of ideas or loose associations  Thought content: Devoid of any auditory visual hallucinations delusions or other perceptual normalities.  Denies SI/HI intent or plan   Language: able to name objects and repeate phrases  Memory intact  Cognition:  oriented to person, place, and time   Fund of Knowledge: Vocabulary intact, pt is aware of current events and past history  Attention and Concentration intact  Insight judgment limited      ASSESSMENT:   Patient symptoms are:  [] Well controlled  [] Improving  [] Worsening  [x] No change      Diagnosis:   Principal Problem:    Major depressive disorder, recurrent episode, severe with anxious distress (HCC)  Resolved Problems:    Major depression, single episode      LABS:    No results for input(s): \"WBC\", \"HGB\", \"PLT\" in the

## 2024-04-08 NOTE — GROUP NOTE
Group Therapy Note    Date: 4/8/2024    Group Start Time: 1030  Group End Time: 1045  Group Topic: Community Meeting    [facility]    Hallie Hutchins [facility]    Group Therapy Note    Attendees: 12    Date: 4/8/2024  Start Time: 1030  End Time:  1045  Number of Participants: 12    Type of Group: Community Meeting    Was updated on expectations of the unit, staffing, and programming.  Patient shared goal for today as \"Doing breathing exercises to help me relax.\"    Status After Intervention:  Improved    Participation Level: Active Listener and Interactive    Participation Quality: Appropriate, Attentive, Sharing, and Supportive      Speech:  normal      Thought Process/Content: Logical  Linear      Affective Functioning: Congruent      Mood:  Appropriate      Level of consciousness:  Alert and Attentive      Response to Learning: Able to verbalize current knowledge/experience, Able to verbalize/acknowledge new learning, Able to retain information, Capable of insight, Able to change behavior, and Progressing to goal      Endings: None Reported    Modes of Intervention: Education, Support, Socialization, Exploration, Clarifying, and Problem-solving      Discipline Responsible: Psychoeducational Specialist      Signature:  ANA Mahmood

## 2024-04-09 NOTE — CARE COORDINATION
FRANKLIN contacted pt's  Shun [phone] (JIMENA signed) to gain collateral information. Shun reported the pt was recently seen for a prolapsed bladder and that caused the pt stress. Shun reported on top of that the pt was receiving a new medication. Shun reported the medication had a bad effect on her and \"set her off.\" Shun reported pt has been struggling with depression for some time but this is the worst it has been. Shun reported the pt will be able to discharge home with him and Shun will be able to transport pt home. Shun confirmed pt will not have access to any weapons or guns at time of discharge.  
Pt was seen by the treatment team. Pt reports feeling a little better today, denied SI. Pt stated she did not sleep well last night due to a lot of physical pain. Pt appears more relaxed today. Pt will return home with her  and [facility].   
SW attempted to contact pt post discharge for follow up call. Pt was unable to be reached at this time.  
[facility] contacted [facility] [phone] to schedule follow up appointments. No answer, a voicemail was left.   
SW contacted the [facility] [phone] to schedule follow up appointments. The pt is scheduled to see Dr. Goss on 4/9.  
SW met with pt to discuss discharge. Pt found in common area socializing with peers. SW spoke with pt in consult room for privacy. She is calm and cooperative with good eye contact, mood is euthymic, she is pleasant. She states that she is feeling better and feels ready and excited to discharge home today. She states that she was feeling anxious yesterday, but that she was able to use grounding techniques in order to help with this. She states that she has a lot of support from her family, that she is close with her children and grandchildren and plans to watch them grow up and spend time with them after discharge. Pt denied SI/HI/AVH. She plans to return home with her , him to transport. Pt plans to follow up with [facility] and states that she plans to stay compliant with her mental health medications. Pt denied any questions or concerns for SW at this time.     SW contacted pt's  Shun [phone] (JIMENA signed) to discuss pt discharge for today. He states that he has been talking with pt and she seems a lot better, states that the medications are working well. He states that he has no concerns for pt discharge and will transport her home.     In order to ensure appropriate transition and discharge planning is in place, the following documents have been transmitted to [facility], as the new outpatient provider:    The d/c diagnosis was transmitted to the next care provider  The reason for hospitalization was transmitted to the next care provider  The d/c medications (dosage and indication) were transmitted to the next care provider   The continuing care plan was transmitted to the next care provider    
    Hallucinations/Delusions (Specify type)  [] Reports [x] Denies     Current or Past Mental Health Treatment:  [x] Yes, When and Where:   [] No    Substance Use/Alcohol Use/Addiction  [] Reports [x] Denies     Tobacco Use (within the last 6 months)  [] Reports [x] Denies     Trauma History  [x] Reports [] Denies     Self Injurious/Self Mutilation Behaviors:   [] Reports:    [] Past [] Present   [x] Denies    Legal History:  []  Yes (Specify)    [x] No    Collateral Contact (JIMENA signed)  Name: Shun Woo  Relationship:   Number: [phone]    Collateral Information:      Access to Weapons per Collateral Contact: [] Reports [] Denies     After consideration of C-SSRS screening results, C-SSRS assessments, and this professional's assessment the patient's overall suicide risk assessed to be:  [] None   [x] Low   [] Moderate   [] High     [x] Discussed current suicide risk, protective and risk factors with RN and NP/Psychiatrist.    Discharge Plan:  [x] Home: Where she lives with her .  [] Shelter:  [] Crisis Unit:  [] Substance Abuse Rehab:  [] Nursing Facility:  [] Other (Specify):    Follow up Provider:   Pt reported she will follow-up with Dr. Goss at [facility]. Pt does not want Mental Health Counseling at this time.

## 2024-06-20 ENCOUNTER — HOSPITAL ENCOUNTER (OUTPATIENT)
Age: 74
Discharge: HOME | End: 2024-06-22

## 2024-06-21 ENCOUNTER — HOSPITAL ENCOUNTER (OUTPATIENT)
Age: 74
Discharge: HOME | End: 2024-06-23

## 2024-06-21 LAB
ANION GAP SERPL CALCULATED.3IONS-SCNC: 9 MMOL/L (ref 7–16)
BUN SERPL-MCNC: 11 MG/DL (ref 6–23)
CALCIUM SERPL-MCNC: 10 MG/DL (ref 8.6–10.2)
CHLORIDE SERPL-SCNC: 105 MMOL/L (ref 98–107)
CO2 SERPL-SCNC: 28 MMOL/L (ref 22–29)
CREAT SERPL-MCNC: 0.9 MG/DL (ref 0.5–1)
ERYTHROCYTE [DISTWIDTH] IN BLOOD BY AUTOMATED COUNT: 13 % (ref 11.5–15)
ERYTHROCYTE [DISTWIDTH] IN BLOOD BY AUTOMATED COUNT: 13.2 % (ref 11.5–15)
GFR, ESTIMATED: 71 ML/MIN/1.73M2
GLUCOSE SERPL-MCNC: 109 MG/DL (ref 74–99)
HCT VFR BLD AUTO: 38.9 % (ref 34–48)
HCT VFR BLD AUTO: 44.4 % (ref 34–48)
HGB BLD-MCNC: 12.7 G/DL (ref 11.5–15.5)
HGB BLD-MCNC: 14.6 G/DL (ref 11.5–15.5)
MCH RBC QN AUTO: 31.1 PG (ref 26–35)
MCH RBC QN AUTO: 31.1 PG (ref 26–35)
MCHC RBC AUTO-ENTMCNC: 32.6 G/DL (ref 32–34.5)
MCHC RBC AUTO-ENTMCNC: 32.9 G/DL (ref 32–34.5)
MCV RBC AUTO: 94.5 FL (ref 80–99.9)
MCV RBC AUTO: 95.1 FL (ref 80–99.9)
PLATELET # BLD AUTO: 174 K/UL (ref 130–450)
PLATELET # BLD AUTO: 191 K/UL (ref 130–450)
PMV BLD AUTO: 11.7 FL (ref 7–12)
PMV BLD AUTO: 11.7 FL (ref 7–12)
POTASSIUM SERPL-SCNC: 5.4 MMOL/L (ref 3.5–5)
RBC # BLD AUTO: 4.09 M/UL (ref 3.5–5.5)
RBC # BLD AUTO: 4.7 M/UL (ref 3.5–5.5)
SODIUM SERPL-SCNC: 142 MMOL/L (ref 132–146)
WBC OTHER # BLD: 13.3 K/UL (ref 4.5–11.5)
WBC OTHER # BLD: 15.8 K/UL (ref 4.5–11.5)

## 2024-06-21 PROCEDURE — 85027 COMPLETE CBC AUTOMATED: CPT

## 2024-06-21 PROCEDURE — 80048 BASIC METABOLIC PNL TOTAL CA: CPT

## 2024-07-01 LAB — SURGICAL PATHOLOGY REPORT: NORMAL

## 2024-08-05 ENCOUNTER — HOSPITAL ENCOUNTER (OUTPATIENT)
Age: 74
Discharge: HOME | End: 2024-08-07

## 2024-08-05 LAB — CEA SERPL-MCNC: 4.7 NG/ML (ref 0–5.2)

## 2024-08-05 PROCEDURE — 82378 CARCINOEMBRYONIC ANTIGEN: CPT

## 2024-08-13 LAB — SURGICAL PATHOLOGY REPORT: NORMAL

## 2024-10-31 ENCOUNTER — HOSPITAL ENCOUNTER (OUTPATIENT)
Dept: CT IMAGING | Age: 74
Discharge: HOME | End: 2024-11-02
Payer: MEDICARE

## 2024-10-31 DIAGNOSIS — R42 DIZZINESS AND GIDDINESS: ICD-10-CM

## 2024-10-31 DIAGNOSIS — R32 URINARY INCONTINENCE, UNSPECIFIED TYPE: ICD-10-CM

## 2024-10-31 DIAGNOSIS — G31.84 MCI (MILD COGNITIVE IMPAIRMENT) WITH MEMORY LOSS: ICD-10-CM

## 2024-10-31 PROCEDURE — 70450 CT HEAD/BRAIN W/O DYE: CPT

## 2024-11-25 ENCOUNTER — APPOINTMENT (OUTPATIENT)
Dept: GENERAL RADIOLOGY | Age: 74
End: 2024-11-25
Payer: MEDICARE

## 2024-11-25 ENCOUNTER — HOSPITAL ENCOUNTER (INPATIENT)
Age: 74
LOS: 4 days | Discharge: PSYCHIATRIC OR SUBSTANCE USE FACILITY | End: 2024-11-29
Attending: EMERGENCY MEDICINE | Admitting: INTERNAL MEDICINE
Payer: MEDICARE

## 2024-11-25 DIAGNOSIS — I48.0 PAROXYSMAL ATRIAL FIBRILLATION (HCC): ICD-10-CM

## 2024-11-25 DIAGNOSIS — S51.812A LACERATION OF LEFT FOREARM, INITIAL ENCOUNTER: ICD-10-CM

## 2024-11-25 DIAGNOSIS — I48.91 NEW ONSET ATRIAL FIBRILLATION (HCC): ICD-10-CM

## 2024-11-25 DIAGNOSIS — R45.851 SUICIDAL IDEATION: Primary | ICD-10-CM

## 2024-11-25 LAB
AMPHET UR QL SCN: POSITIVE
ANION GAP SERPL CALCULATED.3IONS-SCNC: 12 MMOL/L (ref 7–16)
APAP SERPL-MCNC: <5 UG/ML (ref 10–30)
BARBITURATES UR QL SCN: NEGATIVE
BASOPHILS # BLD: 0.03 K/UL (ref 0–0.2)
BASOPHILS NFR BLD: 0 % (ref 0–2)
BENZODIAZ UR QL: POSITIVE
BILIRUB UR QL STRIP: NEGATIVE
BNP SERPL-MCNC: 240 PG/ML (ref 0–450)
BUN SERPL-MCNC: 17 MG/DL (ref 6–23)
BUPRENORPHINE UR QL: NEGATIVE
CALCIUM SERPL-MCNC: 9.3 MG/DL (ref 8.6–10.2)
CANNABINOIDS UR QL SCN: NEGATIVE
CHLORIDE SERPL-SCNC: 98 MMOL/L (ref 98–107)
CLARITY UR: CLEAR
CO2 SERPL-SCNC: 23 MMOL/L (ref 22–29)
COCAINE UR QL SCN: NEGATIVE
COLOR UR: YELLOW
CREAT SERPL-MCNC: 0.8 MG/DL (ref 0.5–1)
EOSINOPHIL # BLD: 0.01 K/UL (ref 0.05–0.5)
EOSINOPHILS RELATIVE PERCENT: 0 % (ref 0–6)
ERYTHROCYTE [DISTWIDTH] IN BLOOD BY AUTOMATED COUNT: 13.1 % (ref 11.5–15)
ETHANOLAMINE SERPL-MCNC: <10 MG/DL (ref 0–0.08)
FENTANYL UR QL: POSITIVE
GFR, ESTIMATED: 75 ML/MIN/1.73M2
GLUCOSE SERPL-MCNC: 124 MG/DL (ref 74–99)
GLUCOSE UR STRIP-MCNC: NEGATIVE MG/DL
HCT VFR BLD AUTO: 42 % (ref 34–48)
HGB BLD-MCNC: 13.8 G/DL (ref 11.5–15.5)
HGB UR QL STRIP.AUTO: NEGATIVE
IMM GRANULOCYTES # BLD AUTO: 0.06 K/UL (ref 0–0.58)
IMM GRANULOCYTES NFR BLD: 1 % (ref 0–5)
KETONES UR STRIP-MCNC: 15 MG/DL
LEUKOCYTE ESTERASE UR QL STRIP: NEGATIVE
LYMPHOCYTES NFR BLD: 0.86 K/UL (ref 1.5–4)
LYMPHOCYTES RELATIVE PERCENT: 8 % (ref 20–42)
MAGNESIUM SERPL-MCNC: 1.9 MG/DL (ref 1.6–2.6)
MCH RBC QN AUTO: 30.4 PG (ref 26–35)
MCHC RBC AUTO-ENTMCNC: 32.9 G/DL (ref 32–34.5)
MCV RBC AUTO: 92.5 FL (ref 80–99.9)
METHADONE UR QL: NEGATIVE
MONOCYTES NFR BLD: 0.71 K/UL (ref 0.1–0.95)
MONOCYTES NFR BLD: 6 % (ref 2–12)
NEUTROPHILS NFR BLD: 85 % (ref 43–80)
NEUTS SEG NFR BLD: 9.78 K/UL (ref 1.8–7.3)
NITRITE UR QL STRIP: NEGATIVE
OPIATES UR QL SCN: NEGATIVE
OXYCODONE UR QL SCN: NEGATIVE
PCP UR QL SCN: NEGATIVE
PH UR STRIP: 5.5 [PH] (ref 5–9)
PLATELET # BLD AUTO: 164 K/UL (ref 130–450)
PMV BLD AUTO: 11.7 FL (ref 7–12)
POTASSIUM SERPL-SCNC: 4.5 MMOL/L (ref 3.5–5)
PROT UR STRIP-MCNC: NEGATIVE MG/DL
RBC # BLD AUTO: 4.54 M/UL (ref 3.5–5.5)
RBC #/AREA URNS HPF: ABNORMAL /HPF
SALICYLATES SERPL-MCNC: <0.3 MG/DL (ref 0–30)
SODIUM SERPL-SCNC: 133 MMOL/L (ref 132–146)
SP GR UR STRIP: 1.02 (ref 1–1.03)
TEST INFORMATION: ABNORMAL
TOXIC TRICYCLIC SC,BLOOD: NEGATIVE
TROPONIN I SERPL HS-MCNC: 15 NG/L (ref 0–9)
TROPONIN I SERPL HS-MCNC: 16 NG/L (ref 0–9)
UROBILINOGEN UR STRIP-ACNC: 0.2 EU/DL (ref 0–1)
WBC #/AREA URNS HPF: ABNORMAL /HPF
WBC OTHER # BLD: 11.5 K/UL (ref 4.5–11.5)

## 2024-11-25 PROCEDURE — 2060000000 HC ICU INTERMEDIATE R&B

## 2024-11-25 PROCEDURE — 84484 ASSAY OF TROPONIN QUANT: CPT

## 2024-11-25 PROCEDURE — 84439 ASSAY OF FREE THYROXINE: CPT

## 2024-11-25 PROCEDURE — 83735 ASSAY OF MAGNESIUM: CPT

## 2024-11-25 PROCEDURE — 93005 ELECTROCARDIOGRAM TRACING: CPT | Performed by: EMERGENCY MEDICINE

## 2024-11-25 PROCEDURE — 83880 ASSAY OF NATRIURETIC PEPTIDE: CPT

## 2024-11-25 PROCEDURE — 80307 DRUG TEST PRSMV CHEM ANLYZR: CPT

## 2024-11-25 PROCEDURE — 80048 BASIC METABOLIC PNL TOTAL CA: CPT

## 2024-11-25 PROCEDURE — 99285 EMERGENCY DEPT VISIT HI MDM: CPT

## 2024-11-25 PROCEDURE — 80179 DRUG ASSAY SALICYLATE: CPT

## 2024-11-25 PROCEDURE — 93005 ELECTROCARDIOGRAM TRACING: CPT

## 2024-11-25 PROCEDURE — 71045 X-RAY EXAM CHEST 1 VIEW: CPT

## 2024-11-25 PROCEDURE — 90471 IMMUNIZATION ADMIN: CPT

## 2024-11-25 PROCEDURE — 80143 DRUG ASSAY ACETAMINOPHEN: CPT

## 2024-11-25 PROCEDURE — 0HQEXZZ REPAIR LEFT LOWER ARM SKIN, EXTERNAL APPROACH: ICD-10-PCS | Performed by: EMERGENCY MEDICINE

## 2024-11-25 PROCEDURE — 85025 COMPLETE CBC W/AUTO DIFF WBC: CPT

## 2024-11-25 PROCEDURE — 90714 TD VACC NO PRESV 7 YRS+ IM: CPT

## 2024-11-25 PROCEDURE — G0480 DRUG TEST DEF 1-7 CLASSES: HCPCS

## 2024-11-25 PROCEDURE — 6360000002 HC RX W HCPCS

## 2024-11-25 PROCEDURE — 84443 ASSAY THYROID STIM HORMONE: CPT

## 2024-11-25 PROCEDURE — 81001 URINALYSIS AUTO W/SCOPE: CPT

## 2024-11-25 RX ORDER — ENOXAPARIN SODIUM 100 MG/ML
1 INJECTION SUBCUTANEOUS 2 TIMES DAILY
Status: DISCONTINUED | OUTPATIENT
Start: 2024-11-26 | End: 2024-11-29

## 2024-11-25 RX ORDER — SODIUM CHLORIDE 0.9 % (FLUSH) 0.9 %
10 SYRINGE (ML) INJECTION PRN
Status: DISCONTINUED | OUTPATIENT
Start: 2024-11-25 | End: 2024-11-29 | Stop reason: HOSPADM

## 2024-11-25 RX ORDER — MIRTAZAPINE 15 MG/1
15 TABLET, FILM COATED ORAL NIGHTLY
Status: DISCONTINUED | OUTPATIENT
Start: 2024-11-26 | End: 2024-11-29 | Stop reason: HOSPADM

## 2024-11-25 RX ORDER — ACETAMINOPHEN 325 MG/1
650 TABLET ORAL EVERY 6 HOURS PRN
Status: DISCONTINUED | OUTPATIENT
Start: 2024-11-25 | End: 2024-11-29 | Stop reason: HOSPADM

## 2024-11-25 RX ORDER — SODIUM CHLORIDE 0.9 % (FLUSH) 0.9 %
5-40 SYRINGE (ML) INJECTION EVERY 12 HOURS SCHEDULED
Status: DISCONTINUED | OUTPATIENT
Start: 2024-11-26 | End: 2024-11-29 | Stop reason: HOSPADM

## 2024-11-25 RX ORDER — POLYETHYLENE GLYCOL 3350 17 G/17G
17 POWDER, FOR SOLUTION ORAL DAILY PRN
Status: DISCONTINUED | OUTPATIENT
Start: 2024-11-25 | End: 2024-11-29 | Stop reason: HOSPADM

## 2024-11-25 RX ORDER — ONDANSETRON 4 MG/1
4 TABLET, ORALLY DISINTEGRATING ORAL EVERY 8 HOURS PRN
Status: DISCONTINUED | OUTPATIENT
Start: 2024-11-25 | End: 2024-11-29 | Stop reason: HOSPADM

## 2024-11-25 RX ORDER — VENLAFAXINE HYDROCHLORIDE 75 MG/1
75 CAPSULE, EXTENDED RELEASE ORAL
Status: DISCONTINUED | OUTPATIENT
Start: 2024-11-26 | End: 2024-11-29 | Stop reason: HOSPADM

## 2024-11-25 RX ORDER — MAGNESIUM SULFATE IN WATER 40 MG/ML
2000 INJECTION, SOLUTION INTRAVENOUS PRN
Status: DISCONTINUED | OUTPATIENT
Start: 2024-11-25 | End: 2024-11-29 | Stop reason: HOSPADM

## 2024-11-25 RX ORDER — MEMANTINE HYDROCHLORIDE 5 MG/1
5 TABLET ORAL 2 TIMES DAILY
Status: DISCONTINUED | OUTPATIENT
Start: 2024-11-26 | End: 2024-11-29 | Stop reason: HOSPADM

## 2024-11-25 RX ORDER — PANTOPRAZOLE SODIUM 40 MG/1
40 TABLET, DELAYED RELEASE ORAL DAILY
Status: DISCONTINUED | OUTPATIENT
Start: 2024-11-26 | End: 2024-11-29 | Stop reason: HOSPADM

## 2024-11-25 RX ORDER — POTASSIUM CHLORIDE 7.45 MG/ML
10 INJECTION INTRAVENOUS PRN
Status: DISCONTINUED | OUTPATIENT
Start: 2024-11-25 | End: 2024-11-29 | Stop reason: HOSPADM

## 2024-11-25 RX ORDER — POTASSIUM CHLORIDE 1500 MG/1
40 TABLET, EXTENDED RELEASE ORAL PRN
Status: DISCONTINUED | OUTPATIENT
Start: 2024-11-25 | End: 2024-11-29 | Stop reason: HOSPADM

## 2024-11-25 RX ORDER — ACETAMINOPHEN 650 MG/1
650 SUPPOSITORY RECTAL EVERY 6 HOURS PRN
Status: DISCONTINUED | OUTPATIENT
Start: 2024-11-25 | End: 2024-11-29 | Stop reason: HOSPADM

## 2024-11-25 RX ORDER — LIDOCAINE HYDROCHLORIDE 10 MG/ML
5 INJECTION, SOLUTION INFILTRATION; PERINEURAL ONCE
Status: DISCONTINUED | OUTPATIENT
Start: 2024-11-25 | End: 2024-11-29 | Stop reason: HOSPADM

## 2024-11-25 RX ORDER — SODIUM CHLORIDE 9 MG/ML
INJECTION, SOLUTION INTRAVENOUS PRN
Status: DISCONTINUED | OUTPATIENT
Start: 2024-11-25 | End: 2024-11-29 | Stop reason: HOSPADM

## 2024-11-25 RX ORDER — ONDANSETRON 2 MG/ML
4 INJECTION INTRAMUSCULAR; INTRAVENOUS EVERY 6 HOURS PRN
Status: DISCONTINUED | OUTPATIENT
Start: 2024-11-25 | End: 2024-11-29 | Stop reason: HOSPADM

## 2024-11-25 RX ADMIN — CLOSTRIDIUM TETANI TOXOID ANTIGEN (FORMALDEHYDE INACTIVATED) AND CORYNEBACTERIUM DIPHTHERIAE TOXOID ANTIGEN (FORMALDEHYDE INACTIVATED) 0.5 ML: 5; 2 INJECTION, SUSPENSION INTRAMUSCULAR at 20:45

## 2024-11-25 ASSESSMENT — LIFESTYLE VARIABLES
HOW OFTEN DO YOU HAVE A DRINK CONTAINING ALCOHOL: NEVER
HOW MANY STANDARD DRINKS CONTAINING ALCOHOL DO YOU HAVE ON A TYPICAL DAY: PATIENT DOES NOT DRINK

## 2024-11-25 ASSESSMENT — PAIN DESCRIPTION - LOCATION: LOCATION: ABDOMEN

## 2024-11-25 ASSESSMENT — PAIN SCALES - GENERAL: PAINLEVEL_OUTOF10: 5

## 2024-11-26 PROBLEM — R45.851 SUICIDAL IDEATION: Status: ACTIVE | Noted: 2024-11-26

## 2024-11-26 LAB
EKG ATRIAL RATE: 113 BPM
EKG ATRIAL RATE: 60 BPM
EKG P AXIS: 59 DEGREES
EKG P-R INTERVAL: 188 MS
EKG Q-T INTERVAL: 358 MS
EKG Q-T INTERVAL: 426 MS
EKG QRS DURATION: 58 MS
EKG QRS DURATION: 66 MS
EKG QTC CALCULATION (BAZETT): 426 MS
EKG QTC CALCULATION (BAZETT): 479 MS
EKG R AXIS: 15 DEGREES
EKG R AXIS: 53 DEGREES
EKG T AXIS: 36 DEGREES
EKG T AXIS: 95 DEGREES
EKG VENTRICULAR RATE: 108 BPM
EKG VENTRICULAR RATE: 60 BPM
GLUCOSE BLD-MCNC: 80 MG/DL (ref 74–99)
T4 FREE SERPL-MCNC: 1.5 NG/DL (ref 0.9–1.7)
TSH SERPL DL<=0.05 MIU/L-ACNC: 1.11 UIU/ML (ref 0.27–4.2)

## 2024-11-26 PROCEDURE — APPSS60 APP SPLIT SHARED TIME 46-60 MINUTES

## 2024-11-26 PROCEDURE — 93010 ELECTROCARDIOGRAM REPORT: CPT | Performed by: INTERNAL MEDICINE

## 2024-11-26 PROCEDURE — 6360000002 HC RX W HCPCS

## 2024-11-26 PROCEDURE — 2580000003 HC RX 258

## 2024-11-26 PROCEDURE — 82947 ASSAY GLUCOSE BLOOD QUANT: CPT

## 2024-11-26 PROCEDURE — 2060000000 HC ICU INTERMEDIATE R&B

## 2024-11-26 PROCEDURE — 6370000000 HC RX 637 (ALT 250 FOR IP)

## 2024-11-26 PROCEDURE — 99222 1ST HOSP IP/OBS MODERATE 55: CPT | Performed by: INTERNAL MEDICINE

## 2024-11-26 RX ORDER — TRAZODONE HYDROCHLORIDE 150 MG/1
150 TABLET ORAL NIGHTLY
Status: ON HOLD | COMMUNITY
Start: 2024-11-13

## 2024-11-26 RX ADMIN — SODIUM CHLORIDE, PRESERVATIVE FREE 10 ML: 5 INJECTION INTRAVENOUS at 09:08

## 2024-11-26 RX ADMIN — Medication 3 MG: at 00:43

## 2024-11-26 RX ADMIN — PANTOPRAZOLE SODIUM 40 MG: 40 TABLET, DELAYED RELEASE ORAL at 09:04

## 2024-11-26 RX ADMIN — VENLAFAXINE HYDROCHLORIDE 75 MG: 75 CAPSULE, EXTENDED RELEASE ORAL at 09:04

## 2024-11-26 RX ADMIN — ENOXAPARIN SODIUM 70 MG: 100 INJECTION SUBCUTANEOUS at 22:43

## 2024-11-26 RX ADMIN — MEMANTINE 5 MG: 5 TABLET ORAL at 00:44

## 2024-11-26 RX ADMIN — SODIUM CHLORIDE, PRESERVATIVE FREE 10 ML: 5 INJECTION INTRAVENOUS at 22:44

## 2024-11-26 RX ADMIN — MEMANTINE 5 MG: 5 TABLET ORAL at 09:04

## 2024-11-26 RX ADMIN — POLYETHYLENE GLYCOL 3350 17 G: 17 POWDER, FOR SOLUTION ORAL at 10:53

## 2024-11-26 RX ADMIN — MEMANTINE 5 MG: 5 TABLET ORAL at 22:43

## 2024-11-26 RX ADMIN — ENOXAPARIN SODIUM 70 MG: 100 INJECTION SUBCUTANEOUS at 00:44

## 2024-11-26 RX ADMIN — ENOXAPARIN SODIUM 70 MG: 100 INJECTION SUBCUTANEOUS at 09:05

## 2024-11-26 RX ADMIN — MIRTAZAPINE 15 MG: 15 TABLET, FILM COATED ORAL at 22:43

## 2024-11-26 ASSESSMENT — PAIN SCALES - GENERAL: PAINLEVEL_OUTOF10: 0

## 2024-11-26 NOTE — ED NOTES
Placed pt on heart monitor and noticed she is currently sinus rhythm; Dr Abad notified and new EKG being done

## 2024-11-26 NOTE — CONSULTS
Inpatient Cardiology Consultation      Reason for Consult:  new onset afib     Consulting Physician: Dr. Osborne     Requesting Physician:  Gayatri Teixeira, APRN-CNP    Date of Consultation: 11/26/2024    History of Present Illness:   Kerri Woo  is a 74 y.o. year old unknown to [facility].     Patient presented to the ED on 11/25/24 with concerns for anxiety and suicidal ideation and suicide attempt.  Patient attempted to cut herself with kitchen knife.  On arrival blood pressure 118/69, heart rate 78, 99% on room air and afebrile.  Labs include sodium 133, potassium 4.5, BUN/creatinine 17/0.8, magnesium 1.9, troponin 16> 15, proBNP 240, TSH 1.11, WBC 11.5, Hgb 13.8.  UDS positive for fentanyl, benzodiazepine, amphetamine.  Chest XR reveals no acute process.  Per ED notes initial EKG reveals A-fib/flutter with second EKG sinus rhythm.  She converted spontaneously without intervention. Patient was initiated on therapeutic Lovenox but was not started on anything for rate control as her heart rate is sustaining in the 50s/60s and occasionally drops to 40s while sleeping.     At the time examination patient is lying in bed and appears comfortable in no acute distress.  Her  is at the bedside.  Patient is currently calm and cooperative.  She reportedly came in with suicidal ideation after attempting to cut herself with her kitchen knife however patient is currently denying any suicidal ideation.  She denies any previous cardiac history and does not follow with a cardiologist.  She denies any chest pain, lightheadedness, dizziness, shortness of breath, MCGARRY, orthopnea, PND.  She does report occasional palpitations/fluttering in her chest.  She states that she lives at home with her  and does walk daily approximately half mile.  She denies any tobacco use, alcohol use or illicit drug use.  She reports no known history of CAD, CHF, atrial fibrillation, CKD.     Please note: past medical  medical-decision making, ordering tests, and medication adjustments as documented today. I contributed >50% to the overall encounter time including face-to-face time providing counseling and or coordination of care with the other providers, reviewing records/tests, counseling/education of the patient, ordering medications/tests/procedures, coordinating care, and documenting clinical information in the EHR.I also discussed the differential diagnosis and all of the proposed management plans with the patient and individuals accompanying the patient.     NOTE: This report was transcribed using voice recognition software. Every effort was made to ensure accuracy; however, inadvertent computerized transcription errors may be present

## 2024-11-26 NOTE — CONSULTS
Once patient is medically cleared patient can be admitted to [facility]. adult psychiatric unit for further psychiatric assessment stabilization and treatment

## 2024-11-26 NOTE — ED NOTES
Patient sleeping on rounds Cardiac monitor NSR HR 53.  Heart rate sarthak's down to the 40's at times during sleeping.  Respirations even and unlabored.  No distress noted.

## 2024-11-26 NOTE — PROGRESS NOTES
Accessed patient's chart due to possible admission. Electronically signed by Viviane Steve RN on 11/26/2024 at 5:19 PM

## 2024-11-26 NOTE — ED PROVIDER NOTES
Department of Emergency Medicine     Written by: Dk Conte MD  Patient Name: Kerri Woo  Admit Date: 2024  7:40 PM  MRN: [id number]                   : 1950    HPI     Chief Complaint   Patient presents with    Psychiatric Evaluation     Patient states that she attempted to harm herself by cutting.  States that she has been deeply depressed, patient does not want to live anymore.  Denies HI.       Kerri Woo is a 74 y.o. female that presents to the ED due to concerns for anxiety and suicidal ideation and suicide attempt.  She was cutting himself with a kitchen knife at home.  Bilateral wrist.  Does not member last tetanus shot was.  No alcohol no drugs.  No fall no syncope.  She has had suicide ideation in the past.  Has seen by psychiatry for this in the past.    Review of systems   Pertinent positives and negatives mentioned in the HPI/MDM.      Physical   Physical Exam  Constitutional:       General: She is not in acute distress.     Appearance: Normal appearance.   Eyes:      Extraocular Movements: Extraocular movements intact.      Pupils: Pupils are equal, round, and reactive to light.   Cardiovascular:      Rate and Rhythm: Normal rate and regular rhythm.   Pulmonary:      Effort: Pulmonary effort is normal. No respiratory distress.   Abdominal:      Palpations: Abdomen is soft.      Tenderness: There is no abdominal tenderness.      Comments: 2 or 3 superficial lacerations to epigastric region of abdomen, nontender to touch no distention no rigidity   Musculoskeletal:      Comments: Multiple superficial lacerations to bilateral forearms, few of them have subcutaneous fat exposed no active hemorrhage no muscle no tendon exposure   Neurological:      Mental Status: She is alert and oriented to person, place, and time. Mental status is at baseline.            Chart review: admitted and evaluated by psych on 2024 for suicidal ideation    Social determinants:

## 2024-11-26 NOTE — H&P
[facility]  History and Physical      CHIEF COMPLAINT:    Chief Complaint   Patient presents with    Psychiatric Evaluation     Patient states that she attempted to harm herself by cutting.  States that she has been deeply depressed, patient does not want to live anymore.  Denies HI.        Patient of Castro Boles Jr., MD presents with:  New onset a-fib (HCC)    History of Present Illness:   Patient is a 74-year-old female with past medical history of depression, postherpetic neuralgia.  Patient presents to the ED with complaints of she is attempting to harm herself by cutting and she tells me she tried to drown herself in the bathtub but was unable to do so..  Patient states she has been deeply depressed and she does not want to live anymore.  Patient denied any homicidal ideation.  Patient states she has had these ideations in April of this past year.  Patient has seen psychiatry for her depression.  While patient was in the ED patient's heart rate increased and EKG showed new onset a flutter.  Psychiatry has been consulted, cardiology has been consulted and patient is admitted to telemetry unit for further treatment.  On evaluation she is in the behavioral unit, attempted suicide by cutting herself in multiple spots.  She was apparently found to have A-fib with RVR and therefore is being admitted to medicine.  Cardiology is also following    REVIEW OF SYSTEMS:  Pertinent negatives are above in HPI.  10 point ROS otherwise negative.      Past Medical History:   Diagnosis Date    Depression     Post herpetic neuralgia          Past Surgical History:   Procedure Laterality Date    BLADDER SUSPENSION      CHOLECYSTECTOMY      HERNIA REPAIR      VARICOSE VEIN SURGERY         Medications Prior to Admission:    Not in a hospital admission.    Note that the patient's home medications were reviewed and the above list is accurate to the best of my knowledge at the time of the exam.    Allergies:

## 2024-11-26 NOTE — ED NOTES
Nurse to nurse report given to Viviane EVANS on 4500 unit which includes patient presentation complaint, pink slip, medications, lab results EKG Qtc, and past psych history and staffing sending CO.

## 2024-11-27 LAB
ANION GAP SERPL CALCULATED.3IONS-SCNC: 7 MMOL/L (ref 7–16)
BUN SERPL-MCNC: 18 MG/DL (ref 6–23)
CALCIUM SERPL-MCNC: 9.3 MG/DL (ref 8.6–10.2)
CHLORIDE SERPL-SCNC: 104 MMOL/L (ref 98–107)
CO2 SERPL-SCNC: 30 MMOL/L (ref 22–29)
CREAT SERPL-MCNC: 1 MG/DL (ref 0.5–1)
ERYTHROCYTE [DISTWIDTH] IN BLOOD BY AUTOMATED COUNT: 13.3 % (ref 11.5–15)
GFR, ESTIMATED: 61 ML/MIN/1.73M2
GLUCOSE SERPL-MCNC: 81 MG/DL (ref 74–99)
HCT VFR BLD AUTO: 40.1 % (ref 34–48)
HGB BLD-MCNC: 12.9 G/DL (ref 11.5–15.5)
MCH RBC QN AUTO: 30.1 PG (ref 26–35)
MCHC RBC AUTO-ENTMCNC: 32.2 G/DL (ref 32–34.5)
MCV RBC AUTO: 93.7 FL (ref 80–99.9)
PLATELET # BLD AUTO: 160 K/UL (ref 130–450)
PMV BLD AUTO: 12.2 FL (ref 7–12)
POTASSIUM SERPL-SCNC: 4.1 MMOL/L (ref 3.5–5)
RBC # BLD AUTO: 4.28 M/UL (ref 3.5–5.5)
SODIUM SERPL-SCNC: 141 MMOL/L (ref 132–146)
WBC OTHER # BLD: 6.8 K/UL (ref 4.5–11.5)

## 2024-11-27 PROCEDURE — 2580000003 HC RX 258

## 2024-11-27 PROCEDURE — 6360000002 HC RX W HCPCS

## 2024-11-27 PROCEDURE — 6370000000 HC RX 637 (ALT 250 FOR IP): Performed by: NURSE PRACTITIONER

## 2024-11-27 PROCEDURE — 2060000000 HC ICU INTERMEDIATE R&B

## 2024-11-27 PROCEDURE — 80048 BASIC METABOLIC PNL TOTAL CA: CPT

## 2024-11-27 PROCEDURE — 85027 COMPLETE CBC AUTOMATED: CPT

## 2024-11-27 PROCEDURE — 36415 COLL VENOUS BLD VENIPUNCTURE: CPT

## 2024-11-27 PROCEDURE — 6370000000 HC RX 637 (ALT 250 FOR IP)

## 2024-11-27 PROCEDURE — 99222 1ST HOSP IP/OBS MODERATE 55: CPT | Performed by: INTERNAL MEDICINE

## 2024-11-27 RX ORDER — METOPROLOL SUCCINATE 25 MG/1
25 TABLET, EXTENDED RELEASE ORAL DAILY
Status: DISCONTINUED | OUTPATIENT
Start: 2024-11-27 | End: 2024-11-29 | Stop reason: HOSPADM

## 2024-11-27 RX ADMIN — VENLAFAXINE HYDROCHLORIDE 75 MG: 75 CAPSULE, EXTENDED RELEASE ORAL at 08:53

## 2024-11-27 RX ADMIN — SODIUM CHLORIDE, PRESERVATIVE FREE 10 ML: 5 INJECTION INTRAVENOUS at 20:13

## 2024-11-27 RX ADMIN — MEMANTINE 5 MG: 5 TABLET ORAL at 08:53

## 2024-11-27 RX ADMIN — METOPROLOL SUCCINATE 25 MG: 25 TABLET, EXTENDED RELEASE ORAL at 18:10

## 2024-11-27 RX ADMIN — PANTOPRAZOLE SODIUM 40 MG: 40 TABLET, DELAYED RELEASE ORAL at 08:49

## 2024-11-27 RX ADMIN — ENOXAPARIN SODIUM 70 MG: 100 INJECTION SUBCUTANEOUS at 20:12

## 2024-11-27 RX ADMIN — ENOXAPARIN SODIUM 70 MG: 100 INJECTION SUBCUTANEOUS at 08:52

## 2024-11-27 RX ADMIN — MEMANTINE 5 MG: 5 TABLET ORAL at 20:12

## 2024-11-27 RX ADMIN — SODIUM CHLORIDE, PRESERVATIVE FREE 10 ML: 5 INJECTION INTRAVENOUS at 08:53

## 2024-11-27 RX ADMIN — MIRTAZAPINE 15 MG: 15 TABLET, FILM COATED ORAL at 20:13

## 2024-11-27 ASSESSMENT — PAIN SCALES - GENERAL: PAINLEVEL_OUTOF10: 0

## 2024-11-27 NOTE — CARE COORDINATION
Called section G, notified of clearance for [facility] and pink slip faxed. Waiting on acceptance and bed assignment.     0946 Nurse to call report to [phone], once accepted patient assigned room 7303A.     1220 Discharge on hold due to a flutter. EP consulted. Updated section G that discharge is cancel for now.     For questions I can be reached at [phone]. STEPHANIA Rowe

## 2024-11-27 NOTE — PROGRESS NOTES
Phoned nurse-to-nurse report to Sandy on [facility]. Sandy reported she would provide psychiatrist with update then notify [facility] if/when accepted and has a be number. Electronically signed by Sandra Bolton RN on 11/27/2024 at 10:31 AM

## 2024-11-27 NOTE — PROGRESS NOTES
4 Eyes Skin Assessment     NAME:  Kerri Woo  YOB: 1950  MEDICAL RECORD NUMBER:  [id number]    The patient is being assessed for  Admission    I agree that at least one RN has performed a thorough Head to Toe Skin Assessment on the patient. ALL assessment sites listed below have been assessed.      Areas assessed by both nurses:    Head, Face, Ears, Shoulders, Back, Chest, Arms, Elbows, Hands, Sacrum. Buttock, Coccyx, Ischium, Legs. Feet and Heels, and Under Medical Devices         Does the Patient have a Wound? Yes wound(s) were present on assessment. LDA wound assessment was Initiated and completed by RN    Scratches to BUE, thigh, and abdomen  Sutures to PAYAM       David Prevention initiated by RN: No  Wound Care Orders initiated by RN: No    Pressure Injury (Stage 3,4, Unstageable, DTI, NWPT, and Complex wounds) if present, place Wound referral order by RN under : No    New Ostomies, if present place, Ostomy referral order under : No     Nurse 1 eSignature: Electronically signed by Viviane Steve RN on 11/26/24 at 7:11 PM EST    **SHARE this note so that the co-signing nurse can place an eSignature**    Nurse 2 eSignature: Electronically signed by Shun Sinclair RN on 11/26/24 at 7:14 PM EST

## 2024-11-27 NOTE — CARE COORDINATION
SW Supervisor aware that patient is medically cleared for admission to . Nurse to nurse will need to be completed (3302) and once accepted, patient will need to be assigned to room 7303A. Assigned SW Nadja Strange aware.    DARRON Rain, GORDO-S  Behavioral Health

## 2024-11-27 NOTE — INTERDISCIPLINARY ROUNDS
[facility] Quality Flow/Interdisciplinary Rounds Progress Note        Quality Flow Rounds held on November 27, 2024    Disciplines Attending:  Bedside Nurse, , , and Nursing Unit Leadership    Kerri Woo was admitted on 11/25/2024  7:40 PM    Anticipated Discharge Date:  11/27/24    Disposition: Behavioral health     David Score:  David Scale Score: 18    Readmission Risk              Risk of Unplanned Readmission:  10           Discussed patient goal for the day, patient clinical progression, and barriers to discharge.  The following Goal(s) of the Day/Commitment(s) have been identified:  Outcomes Documented in Discipline-specific Documentation      Inés Hunt RN  November 27, 2024

## 2024-11-27 NOTE — CARE COORDINATION
Per assigned SW Nadja Strange, pt's transfer to psych unit has been put on hold.  Supervisor notified Komal in admitting.    Once patient is medically cleared, Section G  (3241) will need notified.    DARRON Rain, GORDO-S  Behavioral Health

## 2024-11-27 NOTE — PROGRESS NOTES
[facility]                                Progress note    Subjective:    The patient is awake and alert.    CO at bedside.  No acute events overnight.    Denies chest pain, angina, SOB     Objective:    BP (!) 153/81   Pulse 58   Temp 97.4 °F (36.3 °C) (Temporal)   Resp 16   Ht 1.6 m (5' 3\")   Wt 55.5 kg (122 lb 4.8 oz)   SpO2 99%   BMI 21.66 kg/m²     In: 130 [P.O.:120; I.V.:10]  Out: -   In: 130   Out: -     General appearance: NAD, conversant, quiet  HEENT: AT/NC, MMM  Neck: FROM, supple  Lungs: Clear to auscultation  CV: RRR, no MRGs  Vasc: Radial pulses 2+  Abdomen: Soft, non-tender; no masses or HSM  Extremities: No peripheral edema or digital cyanosis  Skin: no rash, lesions or ulcers  Psych: Alert and oriented to person, place and time  Neuro: Alert and interactive     Recent Labs     11/25/24 2029 11/27/24  0459   WBC 11.5 6.8   HGB 13.8 12.9   HCT 42.0 40.1    160       Recent Labs     11/25/24 2029 11/27/24  0459    141   K 4.5 4.1   CL 98 104   CO2 23 30*   BUN 17 18   CREATININE 0.8 1.0   CALCIUM 9.3 9.3       Assessment:    Principal Problem:    New onset a-fib (HCC)  Active Problems:    Suicidal ideation  Resolved Problems:    * No resolved hospital problems. *      Plan:  74-year-old female presents to the ED with complaints of suicidal ideation and is admitted to telemetry unit with     New onset A-fib  Telemetry monitoring  Rate control medications  Lovenox 1 mg/kilogram twice daily-can be transition to p.o. Eliquis 5 twice daily  Check TSH  Cardiology consulted  rate is adequately controlled on my evaluation currently, she she is cleared from medicine standpoint to be transferred to inpatient psych-no need for medicine admission     Attempted suicide with plan and action taken  CO at bedside, daughter at bedside on my evaluation  Consult psychiatry  Pink [id number] hold  She denies taking any medications and her suicide attempt but did cut herself in

## 2024-11-27 NOTE — PLAN OF CARE
Problem: Discharge Planning  Goal: Discharge to home or other facility with appropriate resources  11/27/2024 1023 by Sandra Bolton RN  Outcome: Progressing  11/27/2024 0113 by Shun Sinclair, RN  Outcome: Progressing     Problem: Skin/Tissue Integrity  Goal: Absence of new skin breakdown  Description: 1.  Monitor for areas of redness and/or skin breakdown  2.  Assess vascular access sites hourly  3.  Every 4-6 hours minimum:  Change oxygen saturation probe site  4.  Every 4-6 hours:  If on nasal continuous positive airway pressure, respiratory therapy assess nares and determine need for appliance change or resting period.  11/27/2024 1023 by Sandra Bolton, RN  Outcome: Progressing  11/27/2024 0113 by Shun Sinclair, RN  Outcome: Progressing     Problem: Safety - Adult  Goal: Free from fall injury  11/27/2024 1023 by Sandra Bolton, RN  Outcome: Progressing  Flowsheets (Taken 11/27/2024 0853)  Free From Fall Injury: Instruct family/caregiver on patient safety  11/27/2024 0113 by Shun Sinclair, RN  Outcome: Progressing

## 2024-11-27 NOTE — CONSULTS
acid (EFFER-K) effervescent tablet 40 mEq  40 mEq Oral PRN Braund, Yolanda R, APRN - CNP        Or    potassium chloride 10 mEq/100 mL IVPB (Peripheral Line)  10 mEq IntraVENous PRN Braund, Yolanda R, APRN - CNP        magnesium sulfate 2000 mg in 50 mL IVPB premix  2,000 mg IntraVENous PRN Braund, Yolanda R, APRN - CNP        ondansetron (ZOFRAN-ODT) disintegrating tablet 4 mg  4 mg Oral Q8H PRN Braund, Yolanda R, APRN - CNP        Or    ondansetron (ZOFRAN) injection 4 mg  4 mg IntraVENous Q6H PRN Braunamparo, Yolanda R, APRN - CNP        polyethylene glycol (GLYCOLAX) packet 17 g  17 g Oral Daily PRN Braunamparo, Yolanda R, APRN - CNP   17 g at 11/26/24 1053    acetaminophen (TYLENOL) tablet 650 mg  650 mg Oral Q6H PRN Danielle, Yolanda R, APRN - CNP        Or    acetaminophen (TYLENOL) suppository 650 mg  650 mg Rectal Q6H PRN Danielle, Yolanda R, APRN - CNP        enoxaparin (LOVENOX) injection 70 mg  1 mg/kg (Order-Specific) SubCUTAneous BID Danielle Yolanda R, APRN - CNP   70 mg at 11/27/24 0852        Allergies   Allergen Reactions    Betadine [Povidone Iodine]        ROS:   Constitutional: Negative for fever,  and appetite change.  Positive for activity change  HENT: Negative for epistaxis.   Eyes: Negative for diploplia, blurred vision.   Respiratory: Negative for cough, chest tightness,  and wheezing.  Positive shortness of breath  Cardiovascular: pertinent positives in HPI  Gastrointestinal: Negative for abdominal pain and blood in stool.   All other review of systems are negative     PHYSICAL EXAM:   Vitals:    11/27/24 0515 11/27/24 0853 11/27/24 1203 11/27/24 1426   BP:  (!) 153/81 117/79 102/67   Pulse:  58 (!) 114 79   Resp:  16 16 15   Temp:  97.4 °F (36.3 °C)  97.2 °F (36.2 °C)   TempSrc:  Temporal  Temporal   SpO2: 99% 99% 97% 96%   Weight: 55.5 kg (122 lb 4.8 oz)      Height:          Constitutional: Well-developed, no acute distress, seen in room palpable family was at bedside as well as sitters.  Patient has visible  suicidal ideation with constant sitter.  -Prior admission for suicidal ideation in April 2024.    3.  Dementia ?  -Namenda    4.  Insomnia  -Trazodone    5.  GERD  -Protonix    Recommendations:    Agree with systemic anticoagulation.  Will switch patient to Eliquis  Low-dose beta-blockade for rate control during episodes  Echocardiography to assess LV function and atrial sizes  Doubt need for any other intervention from electrophysiology standpoint    All of the above was discussed with the patient and her  ,>50% of the time involved face-to-face time providing counseling and or coordination of care with the other providers, preparation for the clinic visit, reviewing records/tests, counseling/education of the patient, ordering medications/tests/procedures, coordinating care, and documenting clinical information in the EHR.   I personally and independently saw and examined patient and reviewed all done pertinent laboratory data, imaging studies, ECGs and rhythm strips. I have reviewed and agree with the APRN history and physical exam as documented in the above note.  History and physical data as well as assessment and plan were modified by me.  Thank you for allowing me to participate in your patient's care.  Please call me if there are any questions or concerns.      Mariaa Mcdonnell MD  Cardiac Electrophysiology  [facility]  [facility] [place]: [facility]  2:57 PM  11/27/2024

## 2024-11-27 NOTE — PROGRESS NOTES
Per [facility] Medical [employer] rep, Fadi, pt ok to d/c with temporary tank and O2 will be delivered despite the holiday tomorrow.     Electronically signed by Sung Meng RN on 11/27/2024 at 5:41 PM    IV and heart monitor off, AVS discussed with pt and daughter at bedside. Electronically signed by Sung Meng RN on 11/27/2024 at 5:42 PM

## 2024-11-27 NOTE — PROGRESS NOTES
Pt A flutter, rate 110-120. Aydee Teixeira notified via telephone. EP consult ordered.    EP consult sent via Zoomph. Electronically signed by Sung Meng RN on 11/27/2024 at 11:54 AM    Discharge order cancelled d/t pt not being stable for d/c to [facility]. Electronically signed by Sung Meng RN on 11/27/2024 at 11:56 AM

## 2024-11-28 LAB
ANION GAP SERPL CALCULATED.3IONS-SCNC: 7 MMOL/L (ref 7–16)
BUN SERPL-MCNC: 21 MG/DL (ref 6–23)
CALCIUM SERPL-MCNC: 9.2 MG/DL (ref 8.6–10.2)
CHLORIDE SERPL-SCNC: 101 MMOL/L (ref 98–107)
CO2 SERPL-SCNC: 30 MMOL/L (ref 22–29)
CREAT SERPL-MCNC: 0.9 MG/DL (ref 0.5–1)
ERYTHROCYTE [DISTWIDTH] IN BLOOD BY AUTOMATED COUNT: 13.3 % (ref 11.5–15)
GFR, ESTIMATED: 63 ML/MIN/1.73M2
GLUCOSE SERPL-MCNC: 88 MG/DL (ref 74–99)
HCT VFR BLD AUTO: 40.6 % (ref 34–48)
HGB BLD-MCNC: 13.1 G/DL (ref 11.5–15.5)
MCH RBC QN AUTO: 30 PG (ref 26–35)
MCHC RBC AUTO-ENTMCNC: 32.3 G/DL (ref 32–34.5)
MCV RBC AUTO: 93.1 FL (ref 80–99.9)
PLATELET # BLD AUTO: 167 K/UL (ref 130–450)
PMV BLD AUTO: 12.1 FL (ref 7–12)
POTASSIUM SERPL-SCNC: 4.5 MMOL/L (ref 3.5–5)
RBC # BLD AUTO: 4.36 M/UL (ref 3.5–5.5)
SODIUM SERPL-SCNC: 138 MMOL/L (ref 132–146)
WBC OTHER # BLD: 7.1 K/UL (ref 4.5–11.5)

## 2024-11-28 PROCEDURE — 80048 BASIC METABOLIC PNL TOTAL CA: CPT

## 2024-11-28 PROCEDURE — 2580000003 HC RX 258

## 2024-11-28 PROCEDURE — 6360000002 HC RX W HCPCS

## 2024-11-28 PROCEDURE — 36415 COLL VENOUS BLD VENIPUNCTURE: CPT

## 2024-11-28 PROCEDURE — 2060000000 HC ICU INTERMEDIATE R&B

## 2024-11-28 PROCEDURE — 6370000000 HC RX 637 (ALT 250 FOR IP): Performed by: NURSE PRACTITIONER

## 2024-11-28 PROCEDURE — 85027 COMPLETE CBC AUTOMATED: CPT

## 2024-11-28 PROCEDURE — 6370000000 HC RX 637 (ALT 250 FOR IP)

## 2024-11-28 RX ADMIN — PANTOPRAZOLE SODIUM 40 MG: 40 TABLET, DELAYED RELEASE ORAL at 08:09

## 2024-11-28 RX ADMIN — SODIUM CHLORIDE, PRESERVATIVE FREE 10 ML: 5 INJECTION INTRAVENOUS at 20:43

## 2024-11-28 RX ADMIN — MEMANTINE 5 MG: 5 TABLET ORAL at 20:40

## 2024-11-28 RX ADMIN — ENOXAPARIN SODIUM 70 MG: 100 INJECTION SUBCUTANEOUS at 20:40

## 2024-11-28 RX ADMIN — POLYETHYLENE GLYCOL 3350 17 G: 17 POWDER, FOR SOLUTION ORAL at 08:16

## 2024-11-28 RX ADMIN — MEMANTINE 5 MG: 5 TABLET ORAL at 08:09

## 2024-11-28 RX ADMIN — SODIUM CHLORIDE, PRESERVATIVE FREE 10 ML: 5 INJECTION INTRAVENOUS at 08:10

## 2024-11-28 RX ADMIN — ENOXAPARIN SODIUM 70 MG: 100 INJECTION SUBCUTANEOUS at 08:10

## 2024-11-28 RX ADMIN — MIRTAZAPINE 15 MG: 15 TABLET, FILM COATED ORAL at 20:42

## 2024-11-28 RX ADMIN — METOPROLOL SUCCINATE 25 MG: 25 TABLET, EXTENDED RELEASE ORAL at 08:10

## 2024-11-28 ASSESSMENT — PAIN SCALES - GENERAL
PAINLEVEL_OUTOF10: 0

## 2024-11-28 NOTE — PROGRESS NOTES
[facility]                                Progress note    Subjective:    The patient is resting on exam.     CO at bedside.  No acute events overnight.    Denies chest pain, angina, SOB     Objective:    /82   Pulse 53   Temp 96.8 °F (36 °C) (Temporal)   Resp 18   Ht 1.6 m (5' 3\")   Wt 55.5 kg (122 lb 4.8 oz)   SpO2 98%   BMI 21.66 kg/m²     In: 490 [P.O.:480; I.V.:10]  Out: -   In: 490   Out: -     General appearance: NAD, conversant, quiet  HEENT: AT/NC, MMM  Neck: FROM, supple  Lungs: Clear to auscultation  CV: RRR, no MRGs  Vasc: Radial pulses 2+  Abdomen: Soft, non-tender; no masses or HSM  Extremities: No peripheral edema or digital cyanosis  Skin: no rash, lesions or ulcers  Psych: Alert and oriented to person, place and time  Neuro: Alert and interactive     Recent Labs     11/25/24 2029 11/27/24  0459 11/28/24  0526   WBC 11.5 6.8 7.1   HGB 13.8 12.9 13.1   HCT 42.0 40.1 40.6    160 167       Recent Labs     11/25/24 2029 11/27/24  0459 11/28/24  0526    141 138   K 4.5 4.1 4.5   CL 98 104 101   CO2 23 30* 30*   BUN 17 18 21   CREATININE 0.8 1.0 0.9   CALCIUM 9.3 9.3 9.2       Assessment:    Principal Problem:    New onset a-fib ([facility])  Active Problems:    Suicidal ideation  Resolved Problems:    * No resolved hospital problems. *      Plan:  74-year-old female presents to the ED with complaints of suicidal ideation and is admitted to telemetry unit with     New onset A-fib  Telemetry monitoring  Rate control medications  Lovenox 1 mg/kilogram twice daily-can be transition to p.o. Eliquis 5 twice daily  Check TSH  Cardiology consulted  rate is adequately controlled on my evaluation currently, she she is cleared from medicine standpoint to be transferred to inpatient psych-no need for medicine admission     Attempted suicide with plan and action taken  CO at bedside, daughter at bedside on my evaluation  Consult psychiatry  Pink [id number] hold  She denies

## 2024-11-28 NOTE — PROGRESS NOTES
Pt refused Effexor this am stating that it makes her more anxious and that she has tried it in the past without success.     Pt now complaining of increased anxiety and requesting med change to something else. Message left for Aydee Teixeira on voicemail.     Vitals: TPR 97.3 - 53 - 14, 137/65, No pain     Electronically signed by Sung Meng RN on 11/28/2024 at 10:54 AM    Psych notified of pt anxiety and requests. Electronically signed by Sung Meng RN on 11/28/2024 at 11:05 AM     No orders placed

## 2024-11-29 ENCOUNTER — APPOINTMENT (OUTPATIENT)
Age: 74
End: 2024-11-29
Payer: MEDICARE

## 2024-11-29 ENCOUNTER — HOSPITAL ENCOUNTER (INPATIENT)
Age: 74
LOS: 1 days | Discharge: ANOTHER HOSPITAL | DRG: 309 | End: 2024-11-30
Attending: PSYCHIATRY & NEUROLOGY | Admitting: PSYCHIATRY & NEUROLOGY
Payer: MEDICARE

## 2024-11-29 VITALS
TEMPERATURE: 97.5 F | SYSTOLIC BLOOD PRESSURE: 141 MMHG | HEIGHT: 63 IN | BODY MASS INDEX: 21.48 KG/M2 | HEART RATE: 51 BPM | WEIGHT: 121.25 LBS | OXYGEN SATURATION: 98 % | DIASTOLIC BLOOD PRESSURE: 61 MMHG | RESPIRATION RATE: 14 BRPM

## 2024-11-29 PROBLEM — F39 MOOD DISORDER (HCC): Status: ACTIVE | Noted: 2024-11-29

## 2024-11-29 LAB
ANION GAP SERPL CALCULATED.3IONS-SCNC: 10 MMOL/L (ref 7–16)
BUN SERPL-MCNC: 15 MG/DL (ref 6–23)
CALCIUM SERPL-MCNC: 9.6 MG/DL (ref 8.6–10.2)
CHLORIDE SERPL-SCNC: 102 MMOL/L (ref 98–107)
CO2 SERPL-SCNC: 28 MMOL/L (ref 22–29)
CREAT SERPL-MCNC: 1 MG/DL (ref 0.5–1)
ECHO AO ASC DIAM: 3.1 CM
ECHO AO ASCENDING AORTA INDEX: 1.99 CM/M2
ECHO AV AREA PEAK VELOCITY: 1.7 CM2
ECHO AV AREA VTI: 1.8 CM2
ECHO AV AREA/BSA PEAK VELOCITY: 1.1 CM2/M2
ECHO AV AREA/BSA VTI: 1.2 CM2/M2
ECHO AV CUSP MM: 1.4 CM
ECHO AV MEAN GRADIENT: 4 MMHG
ECHO AV MEAN VELOCITY: 0.9 M/S
ECHO AV PEAK GRADIENT: 6 MMHG
ECHO AV PEAK VELOCITY: 1.2 M/S
ECHO AV VELOCITY RATIO: 0.58
ECHO AV VTI: 28.4 CM
ECHO BSA: 1.56 M2
ECHO EST RA PRESSURE: 3 MMHG
ECHO LA DIAMETER INDEX: 1.86 CM/M2
ECHO LA DIAMETER: 2.9 CM
ECHO LA VOL A-L A2C: 16 ML (ref 22–52)
ECHO LA VOL A-L A4C: 29 ML (ref 22–52)
ECHO LA VOL MOD A2C: 14 ML (ref 22–52)
ECHO LA VOL MOD A4C: 26 ML (ref 22–52)
ECHO LA VOLUME AREA LENGTH: 25 ML
ECHO LA VOLUME INDEX A-L A2C: 10 ML/M2 (ref 16–34)
ECHO LA VOLUME INDEX A-L A4C: 19 ML/M2 (ref 16–34)
ECHO LA VOLUME INDEX AREA LENGTH: 16 ML/M2 (ref 16–34)
ECHO LA VOLUME INDEX MOD A2C: 9 ML/M2 (ref 16–34)
ECHO LA VOLUME INDEX MOD A4C: 17 ML/M2 (ref 16–34)
ECHO LV EF PHYSICIAN: 65 %
ECHO LV FRACTIONAL SHORTENING: 41 % (ref 28–44)
ECHO LV INTERNAL DIMENSION DIASTOLE INDEX: 2.5 CM/M2
ECHO LV INTERNAL DIMENSION DIASTOLIC: 3.9 CM (ref 3.9–5.3)
ECHO LV INTERNAL DIMENSION SYSTOLIC INDEX: 1.47 CM/M2
ECHO LV INTERNAL DIMENSION SYSTOLIC: 2.3 CM
ECHO LV IVSD: 1 CM (ref 0.6–0.9)
ECHO LV IVSS: 1.2 CM
ECHO LV MASS 2D: 122.1 G (ref 67–162)
ECHO LV MASS INDEX 2D: 78.3 G/M2 (ref 43–95)
ECHO LV POSTERIOR WALL DIASTOLIC: 1 CM (ref 0.6–0.9)
ECHO LV POSTERIOR WALL SYSTOLIC: 1.4 CM
ECHO LV RELATIVE WALL THICKNESS RATIO: 0.51
ECHO LVOT AREA: 3.5 CM2
ECHO LVOT AV VTI INDEX: 0.54
ECHO LVOT DIAM: 2.1 CM
ECHO LVOT MEAN GRADIENT: 1 MMHG
ECHO LVOT PEAK GRADIENT: 2 MMHG
ECHO LVOT PEAK VELOCITY: 0.7 M/S
ECHO LVOT STROKE VOLUME INDEX: 34 ML/M2
ECHO LVOT SV: 53 ML
ECHO LVOT VTI: 15.3 CM
ECHO MV A VELOCITY: 0.92 M/S
ECHO MV AREA PHT: 2 CM2
ECHO MV AREA VTI: 1.9 CM2
ECHO MV E DECELERATION TIME (DT): 255.7 MS
ECHO MV E VELOCITY: 0.65 M/S
ECHO MV E/A RATIO: 0.71
ECHO MV LVOT VTI INDEX: 1.86
ECHO MV MAX VELOCITY: 1 M/S
ECHO MV MEAN GRADIENT: 1 MMHG
ECHO MV MEAN VELOCITY: 0.5 M/S
ECHO MV PEAK GRADIENT: 4 MMHG
ECHO MV PRESSURE HALF TIME (PHT): 109.8 MS
ECHO MV VTI: 28.5 CM
ECHO PV MAX VELOCITY: 0.7 M/S
ECHO PV MEAN GRADIENT: 1 MMHG
ECHO PV MEAN VELOCITY: 0.5 M/S
ECHO PV PEAK GRADIENT: 2 MMHG
ECHO PV VTI: 17.4 CM
ECHO RIGHT VENTRICULAR SYSTOLIC PRESSURE (RVSP): 25 MMHG
ECHO RV INTERNAL DIMENSION: 2.8 CM
ECHO RV TAPSE: 2.3 CM (ref 1.7–?)
ECHO TV REGURGITANT MAX VELOCITY: 2.35 M/S
ECHO TV REGURGITANT PEAK GRADIENT: 22 MMHG
ERYTHROCYTE [DISTWIDTH] IN BLOOD BY AUTOMATED COUNT: 13.2 % (ref 11.5–15)
GFR, ESTIMATED: 62 ML/MIN/1.73M2
GLUCOSE SERPL-MCNC: 92 MG/DL (ref 74–99)
HCT VFR BLD AUTO: 42.4 % (ref 34–48)
HGB BLD-MCNC: 13.8 G/DL (ref 11.5–15.5)
MCH RBC QN AUTO: 30.1 PG (ref 26–35)
MCHC RBC AUTO-ENTMCNC: 32.5 G/DL (ref 32–34.5)
MCV RBC AUTO: 92.6 FL (ref 80–99.9)
PLATELET # BLD AUTO: 164 K/UL (ref 130–450)
PMV BLD AUTO: 12.4 FL (ref 7–12)
POTASSIUM SERPL-SCNC: 4.6 MMOL/L (ref 3.5–5)
RBC # BLD AUTO: 4.58 M/UL (ref 3.5–5.5)
SODIUM SERPL-SCNC: 140 MMOL/L (ref 132–146)
WBC OTHER # BLD: 6.7 K/UL (ref 4.5–11.5)

## 2024-11-29 PROCEDURE — 6360000002 HC RX W HCPCS

## 2024-11-29 PROCEDURE — 85027 COMPLETE CBC AUTOMATED: CPT

## 2024-11-29 PROCEDURE — 6370000000 HC RX 637 (ALT 250 FOR IP)

## 2024-11-29 PROCEDURE — 6370000000 HC RX 637 (ALT 250 FOR IP): Performed by: FAMILY MEDICINE

## 2024-11-29 PROCEDURE — 93306 TTE W/DOPPLER COMPLETE: CPT

## 2024-11-29 PROCEDURE — 6370000000 HC RX 637 (ALT 250 FOR IP): Performed by: NURSE PRACTITIONER

## 2024-11-29 PROCEDURE — 93306 TTE W/DOPPLER COMPLETE: CPT | Performed by: INTERNAL MEDICINE

## 2024-11-29 PROCEDURE — 99232 SBSQ HOSP IP/OBS MODERATE 35: CPT | Performed by: INTERNAL MEDICINE

## 2024-11-29 PROCEDURE — G0378 HOSPITAL OBSERVATION PER HR: HCPCS

## 2024-11-29 PROCEDURE — 1240000000 HC EMOTIONAL WELLNESS R&B

## 2024-11-29 PROCEDURE — 36415 COLL VENOUS BLD VENIPUNCTURE: CPT

## 2024-11-29 PROCEDURE — 2580000003 HC RX 258

## 2024-11-29 PROCEDURE — 80048 BASIC METABOLIC PNL TOTAL CA: CPT

## 2024-11-29 RX ORDER — MAGNESIUM HYDROXIDE/ALUMINUM HYDROXICE/SIMETHICONE 120; 1200; 1200 MG/30ML; MG/30ML; MG/30ML
30 SUSPENSION ORAL PRN
Status: DISCONTINUED | OUTPATIENT
Start: 2024-11-29 | End: 2024-11-30 | Stop reason: HOSPADM

## 2024-11-29 RX ORDER — PANTOPRAZOLE SODIUM 40 MG/1
40 TABLET, DELAYED RELEASE ORAL DAILY
Status: DISCONTINUED | OUTPATIENT
Start: 2024-11-30 | End: 2024-11-30 | Stop reason: HOSPADM

## 2024-11-29 RX ORDER — MIRTAZAPINE 15 MG/1
15 TABLET, FILM COATED ORAL NIGHTLY
Status: CANCELLED | OUTPATIENT
Start: 2024-11-29

## 2024-11-29 RX ORDER — HALOPERIDOL 5 MG/1
5 TABLET ORAL EVERY 6 HOURS PRN
Status: DISCONTINUED | OUTPATIENT
Start: 2024-11-29 | End: 2024-11-30 | Stop reason: HOSPADM

## 2024-11-29 RX ORDER — METOPROLOL SUCCINATE 25 MG/1
25 TABLET, EXTENDED RELEASE ORAL DAILY
Status: DISCONTINUED | OUTPATIENT
Start: 2024-11-30 | End: 2024-11-30 | Stop reason: HOSPADM

## 2024-11-29 RX ORDER — ACETAMINOPHEN 650 MG/1
650 SUPPOSITORY RECTAL EVERY 6 HOURS PRN
Status: DISCONTINUED | OUTPATIENT
Start: 2024-11-29 | End: 2024-11-30 | Stop reason: HOSPADM

## 2024-11-29 RX ORDER — ACETAMINOPHEN 325 MG/1
650 TABLET ORAL EVERY 6 HOURS PRN
Status: CANCELLED | OUTPATIENT
Start: 2024-11-29

## 2024-11-29 RX ORDER — VENLAFAXINE HYDROCHLORIDE 75 MG/1
75 CAPSULE, EXTENDED RELEASE ORAL
Status: DISCONTINUED | OUTPATIENT
Start: 2024-11-30 | End: 2024-11-30 | Stop reason: HOSPADM

## 2024-11-29 RX ORDER — PANTOPRAZOLE SODIUM 40 MG/1
40 TABLET, DELAYED RELEASE ORAL DAILY
Status: CANCELLED | OUTPATIENT
Start: 2024-11-30

## 2024-11-29 RX ORDER — METOPROLOL SUCCINATE 25 MG/1
25 TABLET, EXTENDED RELEASE ORAL DAILY
Status: CANCELLED | OUTPATIENT
Start: 2024-11-30

## 2024-11-29 RX ORDER — NICOTINE 21 MG/24HR
1 PATCH, TRANSDERMAL 24 HOURS TRANSDERMAL DAILY
Status: DISCONTINUED | OUTPATIENT
Start: 2024-11-29 | End: 2024-11-30 | Stop reason: HOSPADM

## 2024-11-29 RX ORDER — MIRTAZAPINE 15 MG/1
15 TABLET, FILM COATED ORAL NIGHTLY
Status: DISCONTINUED | OUTPATIENT
Start: 2024-11-29 | End: 2024-11-30 | Stop reason: HOSPADM

## 2024-11-29 RX ORDER — MEMANTINE HYDROCHLORIDE 5 MG/1
5 TABLET ORAL 2 TIMES DAILY
Status: DISCONTINUED | OUTPATIENT
Start: 2024-11-29 | End: 2024-11-30 | Stop reason: HOSPADM

## 2024-11-29 RX ORDER — HALOPERIDOL 5 MG/ML
5 INJECTION INTRAMUSCULAR EVERY 6 HOURS PRN
Status: DISCONTINUED | OUTPATIENT
Start: 2024-11-29 | End: 2024-11-30 | Stop reason: HOSPADM

## 2024-11-29 RX ORDER — VENLAFAXINE HYDROCHLORIDE 75 MG/1
75 CAPSULE, EXTENDED RELEASE ORAL
Status: CANCELLED | OUTPATIENT
Start: 2024-11-30

## 2024-11-29 RX ORDER — ACETAMINOPHEN 650 MG/1
650 SUPPOSITORY RECTAL EVERY 6 HOURS PRN
Status: CANCELLED | OUTPATIENT
Start: 2024-11-29

## 2024-11-29 RX ORDER — ACETAMINOPHEN 325 MG/1
650 TABLET ORAL EVERY 6 HOURS PRN
Status: DISCONTINUED | OUTPATIENT
Start: 2024-11-29 | End: 2024-11-29 | Stop reason: SDUPTHER

## 2024-11-29 RX ORDER — MEMANTINE HYDROCHLORIDE 5 MG/1
5 TABLET ORAL 2 TIMES DAILY
Status: CANCELLED | OUTPATIENT
Start: 2024-11-29

## 2024-11-29 RX ORDER — HYDROXYZINE HYDROCHLORIDE 50 MG/1
50 TABLET, FILM COATED ORAL 3 TIMES DAILY PRN
Status: DISCONTINUED | OUTPATIENT
Start: 2024-11-29 | End: 2024-11-30 | Stop reason: HOSPADM

## 2024-11-29 RX ORDER — ACETAMINOPHEN 325 MG/1
650 TABLET ORAL EVERY 6 HOURS PRN
Status: DISCONTINUED | OUTPATIENT
Start: 2024-11-29 | End: 2024-11-30 | Stop reason: HOSPADM

## 2024-11-29 RX ADMIN — METOPROLOL SUCCINATE 25 MG: 25 TABLET, EXTENDED RELEASE ORAL at 08:41

## 2024-11-29 RX ADMIN — MEMANTINE 5 MG: 5 TABLET ORAL at 21:04

## 2024-11-29 RX ADMIN — SODIUM CHLORIDE, PRESERVATIVE FREE 10 ML: 5 INJECTION INTRAVENOUS at 08:42

## 2024-11-29 RX ADMIN — APIXABAN 5 MG: 5 TABLET, FILM COATED ORAL at 21:04

## 2024-11-29 RX ADMIN — MIRTAZAPINE 15 MG: 15 TABLET, FILM COATED ORAL at 21:04

## 2024-11-29 RX ADMIN — PANTOPRAZOLE SODIUM 40 MG: 40 TABLET, DELAYED RELEASE ORAL at 08:41

## 2024-11-29 RX ADMIN — MEMANTINE 5 MG: 5 TABLET ORAL at 08:42

## 2024-11-29 RX ADMIN — ENOXAPARIN SODIUM 70 MG: 100 INJECTION SUBCUTANEOUS at 08:41

## 2024-11-29 ASSESSMENT — PAIN SCALES - GENERAL
PAINLEVEL_OUTOF10: 0
PAINLEVEL_OUTOF10: 0
PAINLEVEL_OUTOF10: 5
PAINLEVEL_OUTOF10: 0
PAINLEVEL_OUTOF10: 0

## 2024-11-29 ASSESSMENT — SLEEP AND FATIGUE QUESTIONNAIRES
DO YOU HAVE DIFFICULTY SLEEPING: YES
DO YOU USE A SLEEP AID: YES
AVERAGE NUMBER OF SLEEP HOURS: 8

## 2024-11-29 ASSESSMENT — PAIN DESCRIPTION - PAIN TYPE: TYPE: ACUTE PAIN

## 2024-11-29 ASSESSMENT — PAIN DESCRIPTION - FREQUENCY: FREQUENCY: CONTINUOUS

## 2024-11-29 ASSESSMENT — PAIN DESCRIPTION - LOCATION: LOCATION: WRIST

## 2024-11-29 ASSESSMENT — PAIN DESCRIPTION - DESCRIPTORS: DESCRIPTORS: ACHING;OTHER (COMMENT)

## 2024-11-29 ASSESSMENT — PAIN - FUNCTIONAL ASSESSMENT: PAIN_FUNCTIONAL_ASSESSMENT: ACTIVITIES ARE NOT PREVENTED

## 2024-11-29 ASSESSMENT — PAIN DESCRIPTION - ORIENTATION: ORIENTATION: LEFT

## 2024-11-29 NOTE — PROGRESS NOTES
Nurse to nurse called to [phone]. All questions answered to satisfaction by this RN.     Electronically signed by Sung Meng RN on 11/29/2024 at 4:01 PM     No pertinent past medical history

## 2024-11-29 NOTE — PROGRESS NOTES
Patient continues to refuse Effexor stating \"It never worked for me before, I took it for two weeks.  It makes me more nervous!\"  Patient is pleasant, alert and oriented. Denies suicidal ideation.  CO Marian [facility] maintained at bedside.

## 2024-11-29 NOTE — DISCHARGE INSTR - COC
Continuity of Care Form    Patient Name: Kerri Woo   :  1950  MRN:  [id number]    Admit date:  2024  Discharge date:  2024    Code Status Order: Full Code   Advance Directives:   Advance Care Flowsheet Documentation             Admitting Physician:  Chely Guevara MD  PCP: Castro Boles Jr., MD    Discharging Nurse: Sung Meng RN  Discharging Hospital Unit/Room#: 4512/4512-A  Discharging Unit Phone Number: [phone]    Emergency Contact:   Extended Emergency Contact Information  Primary Emergency Contact: Shun Woo  Address: [address] [place] [address]  Home Phone: [phone]  Relation: Spouse    Past Surgical History:  Past Surgical History:   Procedure Laterality Date    BLADDER SUSPENSION      CHOLECYSTECTOMY      HERNIA REPAIR      VARICOSE VEIN SURGERY         Immunization History:   Immunization History   Administered Date(s) Administered    COVID-19, MODERNA BLUE border, Primary or Immunocompromised, (age 12y+), IM, 100 mcg/0.5mL 2021    TD 5LF, TENIVAC, (age 7y+), IM, 0.5mL 2024       Active Problems:  Patient Active Problem List   Diagnosis Code    Major depressive disorder, recurrent episode, severe with anxious distress ([facility]) F33.2    New onset a-fib ([facility]) I48.91    Suicidal ideation R45.851       Isolation/Infection:   Isolation            No Isolation          Patient Infection Status       None to display            Nurse Assessment:  Last Vital Signs: BP (!) 141/61   Pulse 51   Temp 97.5 °F (36.4 °C) (Oral)   Resp 14   Ht 1.6 m (5' 2.99\")   Wt 55 kg (121 lb 4.1 oz)   SpO2 98%   BMI 21.48 kg/m²     Last documented pain score (0-10 scale): Pain Level: 0  Last Weight:   Wt Readings from Last 1 Encounters:   24 55 kg (121 lb 4.1 oz)     Mental Status:  oriented, coherent, logical, and thought processes intact    IV Access:  - None    Nursing Mobility/ADLs:  Walking   Independent  Transfer   Score:  Readmission Risk              Risk of Unplanned Readmission:  11           Discharging to Facility/ Agency   Name:   Address:  Phone:  Fax:    Dialysis Facility (if applicable)   Name:  Address:  Dialysis Schedule:  Phone:  Fax:    / signature: {Esignature:699066459}    PHYSICIAN SECTION    Prognosis: {Prognosis:6592908979}    Condition at Discharge: { Patient Condition:471839894}    Rehab Potential (if transferring to Rehab): {Prognosis:1480865033}    Recommended Labs or Other Treatments After Discharge: ***    Physician Certification: I certify the above information and transfer of Kerri Woo  is necessary for the continuing treatment of the diagnosis listed and that she requires {Admit to Appropriate Level of Care:52695} for {GREATER/LESS:743995735} 30 days.     Update Admission H&P: {CHP DME Changes in HandP:697759878}    PHYSICIAN SIGNATURE:  {Esignature:284131659}

## 2024-11-29 NOTE — DISCHARGE SUMMARY
[facility]   Discharge summary   Patient ID:  Kerri Woo  [id number]  74 y.o.  1950    Admit date: 11/25/2024    Discharge date and time: 11/29/2024    Admission Diagnoses:   Patient Active Problem List   Diagnosis    Major depressive disorder, recurrent episode, severe with anxious distress (HCC)    New onset a-fib (HCC)    Suicidal ideation       Discharge Diagnoses: ***    Consults: {consultation:82657}    Procedures: ***    Hospital Course: The patient is a 74 y.o. female of Castro Boles Jr., MD with significant past medical history of *** who presents with ***    Recent Labs     11/27/24  0459 11/28/24  0526 11/29/24  0620   WBC 6.8 7.1 6.7   HGB 12.9 13.1 13.8   HCT 40.1 40.6 42.4    167 164       Recent Labs     11/27/24  0459 11/28/24  0526 11/29/24  0620    138 140   K 4.1 4.5 4.6    101 102   CO2 30* 30* 28   BUN 18 21 15   CREATININE 1.0 0.9 1.0   CALCIUM 9.3 9.2 9.6       XR CHEST PORTABLE    Result Date: 11/25/2024  EXAMINATION: ONE XRAY VIEW OF THE CHEST 11/25/2024 8:34 pm COMPARISON: None. HISTORY: ORDERING SYSTEM PROVIDED HISTORY: New onset A-fib TECHNOLOGIST PROVIDED HISTORY: Reason for exam:->New onset A-fib FINDINGS: The lungs are without acute focal process.  There is no effusion or pneumothorax. The cardiomediastinal silhouette is without acute process. The osseous structures are without acute process.     No acute process.       Discharge Exam:    HEENT: NCAT,  PERRLA, No JVD  Heart:  RRR, no murmurs, gallops, or rubs.  Lungs:  CTA bilaterally, no wheeze, rales or rhonchi  Abd: bowel sounds present, nontender, nondistended, no masses  Extrem:  No clubbing, cyanosis, or edema    Disposition: {disposition:89181}     Patient Condition at Discharge: ***    Patient Instructions:      Medication List        STOP taking these medications      mirtazapine 15 MG tablet  Commonly known as: REMERON            ASK your doctor about these medications       melatonin 3 MG Tabs tablet  Take 1 tablet by mouth nightly as needed (sleep)     memantine 5 MG tablet  Commonly known as: NAMENDA  Take 1 tablet by mouth 2 times daily     omeprazole 20 MG delayed release capsule  Commonly known as: PRILOSEC     pantoprazole 40 MG tablet  Commonly known as: PROTONIX     traZODone 150 MG tablet  Commonly known as: DESYREL     venlafaxine 75 MG extended release capsule  Commonly known as: EFFEXOR XR  Take 1 capsule by mouth daily (with breakfast)            Activity: {discharge activity:87185}  Diet: {diet:43407}    Pt has been advised to:    Follow-up with Castro Boles Jr., MD in 1 week.  Follow-up with consultants as recommended by them      Signed:  MADDISON Vasquez CNP  11/29/2024  2:55 PM

## 2024-11-29 NOTE — PROGRESS NOTES
[facility]- [facility]  Inpatient Progress Report  PATIENT: Kerri Woo  MEDICAL RECORD NUMBER: [id number]  DATE OF SERVICE:  11/29/2024  ATTENDING ELECTROPHYSIOLOGIST: Mariaa Mcdonnell MD   PRIMARY ELECTROPHYSIOLOGIST: Mariaa Mcdonnell MD   REFERRING PHYSICIAN:  Castro Boles Jr., MD  CHIEF COMPLAINT: Suicidal attempt, syncope    HPI: This is a 74 y.o. female with a history of bladder incontinence, constipation, prediabetes, GERD, depression, questionable dementia.  She has no cardiac history and has never seen a cardiologist/electrophysiologist.  At home she is managed on Namenda, Prilosec, trazodone and Effexor.    She was previously admitted for suicidal ideation April 2024 at that time she was found to be in normal sinus rhythm.  Patient reports that she was at home with her  is typically functionally independent and able to complete her ADLs without limitation of chest pain, shortness of breath.  Over the last 3 months she has been noted progressive dyspnea upon exertion, and inability to complete her activities of daily living due to activity intolerance.  She also notes palpitations that have been going on for several months lasting seconds to minutes without associated symptoms typically with activity.    On 11/25/2024 the patient was at home when she attempted suicide by drowning herself in the bathtub and slitting her wrists.  After she attempted this she changed her mind and attempted to bandage her wounds when she  had syncope and was seen by her  with EMS services being notified.     She was seen in emergency department initial EKG revealed atrial fibs/flutter with a second EKG revealing sinus rhythm.  She was seen inpatient by Dr. Osborne who noted new onset atrial fled her on low-dose Toprol 12.5 Mg twice daily as well as Eliquis.  On hospital day 2 at 930 the patient converted from sinus rhythm to atrial flutter with an  diastolic dysfunction with normal LAP.    Aortic Valve: Mild sclerosis of the aortic valve cusps.    Mitral Valve: Mild annular calcification. Mild stenosis noted.    Tricuspid Valve: Mild regurgitation.    Interatrial Septum: PFO present with a right to left shunt visible by color Doppler. Hypermobile interatrial septum.    Image quality is fair.     Comparison Study Information    Prior Study    There is no prior study available for comparison     Echo Findings    Left Ventricle Normal left ventricular systolic function with a visually estimated EF of 60 - 65%. Left ventricle size is normal. Normal wall thickness. Normal wall motion. Grade I diastolic dysfunction with normal LAP.   Left Atrium Left atrium size is normal.   Interatrial Septum PFO present with a right to left shunt visible by color Doppler. Hypermobile interatrial septum.   Right Ventricle Right ventricle size is normal. Normal systolic function.   Right Atrium Right atrium size is normal.   Aortic Valve Mild sclerosis of the aortic valve cusps. No regurgitation. No significant stenosis.   Mitral Valve Mild annular calcification. Trace regurgitation. Mild stenosis noted.   Tricuspid Valve Valve structure is normal. Mild regurgitation. Normal RVSP.   Pulmonic Valve The pulmonic valve was not well visualized. No regurgitation.   Aorta Normal sized aortic root.   Pericardium No pericardial effusion.       I have independently reviewed all of the ECGs and rhythm strips per above     Assessment/Plan:     1.  New onset paroxysmal atrial fibrillation  -First seen 11/25/2024 when admitted for suicide ideation with 3-month history of palpitations.  -[id number] 3 (age, gender)  -Anticoagulation: Currently on Eliquis 5mg BID.   - Rate control Toprol 25mg QD.     2.  Anxiety/depression  -Patient managed on Effexor at home.  -Presented for suicidal ideation with constant sitter.  -Prior admission for suicidal ideation in April 2024.    3.  Dementia

## 2024-11-29 NOTE — BH NOTE
[facility]  Admission Note     Admission Type: Involuntary       Reason for admission:Mood disorder         Addictive Behavior: none       Medical Problems:   Past Medical History:   Diagnosis Date    Depression     Post herpetic neuralgia        Status EXAM:  Mental Status and Behavioral Exam  Normal: No  Level of Assistance: Independent/Self  Facial Expression: Sad, Brightened  Affect: Appropriate  Level of Consciousness: Alert  Frequency of Checks: 4 times per hour, close  Mood:Normal: No  Mood: Depressed, Anxious  Motor Activity:Normal: Yes  Eye Contact: Good  Observed Behavior: Cooperative  Sexual Misconduct History: Current - no  Preception: [place] to person, [place] to time, [place] to place, [place] to situation  Attention:Normal: Yes  Thought Processes: Circumstantial  Thought Content:Normal: No  Thought Content: Preoccupations  Depression Symptoms: Change in energy level, Feelings of helplessness, Feelings of hopelessess  Anxiety Symptoms: Generalized  Cassidy Symptoms: No problems reported or observed.  Hallucinations: None  Delusions: No  Memory:Normal: Yes  Insight and Judgment: No  Insight and Judgment: Poor judgment, Poor insight    Tobacco Screening:  Practical Counseling, on admission, dotty X, if applicable and completed (first 3 are required if patient doesn't refuse)           ( ) Recognizing danger situations (included triggers and roadblocks)                    ( ) Coping skills (new ways to manage stress,relaxation techniques, changing routine, distraction)                                                           ( ) Basic information about quitting (benefits of quitting, techniques in how to quit, available resources  ( ) [facility]                                                                                                                   ( ) Patient refused counseling  ( ) Patient has not smoked in the last 30

## 2024-11-29 NOTE — PROGRESS NOTES
Spiritual Health History and Assessment/Progress Note  [facility]    (P) Initial Encounter, Spiritual/Emotional Needs,  ,  ,      Name: Kerri Woo MRN: [id number]    Age: 74 y.o.     Sex: female   Language: English   Christianity: Non-Yazdanism   New onset a-fib (HCC)     Date: 11/29/2024                           [address]        Referral/Consult From: (P) Rounding, Multi-disciplinary team   Encounter Overview/Reason: (P) Initial Encounter, Spiritual/Emotional Needs  Service Provided For: (P) Patient    Nakia, Belief, Meaning:   Patient identifies as spiritual  Family/Friends identify as spiritual      Importance and Influence:  Patient has spiritual/personal beliefs that influence decisions regarding their health  Family/Friends no family present    Community:  Patient is connected with a spiritual community  Family/Friends no family present    Assessment and Plan of Care:     Patient Interventions include: Affirmed coping skills/support systems  Family/Friends Interventions include: No family/friends present    Patient Plan of Care: No spiritual needs identified for follow-up  Family/Friends Plan of Care: No family/friends present    I began the visit asking the patient how she was doing.  She immediately told me she was depressed.  We talked about what that looked like and felt like.  We talked through her heart episodes briefly.    We talked about family and her life in general.  She volunteered good things -   We concluded our time in prayer.        Electronically signed by Chaplain FER on 11/29/2024 at 11:19 AM

## 2024-11-29 NOTE — CARE COORDINATION
CM Update: Discharge Order Noted: Discharge plan is [facility]. Called [facility] G, notified of clearance for [facility] and pink slip faxed. Waiting on acceptance and bed assignment. ASTRID/FRANKLIN to follow. MT    1520-pt was accepted to [address] [place] [address]. Nurse to Nurse [phone]. ASTRID/FRANKLIN to follow. MT

## 2024-11-29 NOTE — PLAN OF CARE
Problem: Discharge Planning  Goal: Discharge to home or other facility with appropriate resources  Outcome: Progressing  Flowsheets (Taken 11/29/2024 1511)  Discharge to home or other facility with appropriate resources: Identify barriers to discharge with patient and caregiver     Problem: Skin/Tissue Integrity  Goal: Absence of new skin breakdown  Description: 1.  Monitor for areas of redness and/or skin breakdown  2.  Assess vascular access sites hourly  3.  Every 4-6 hours minimum:  Change oxygen saturation probe site  4.  Every 4-6 hours:  If on nasal continuous positive airway pressure, respiratory therapy assess nares and determine need for appliance change or resting period.  Outcome: Progressing     Problem: Skin/Tissue Integrity - Adult  Goal: Skin integrity remains intact  Outcome: Progressing

## 2024-11-29 NOTE — ED NOTES
The pt was accepted to [address] [place] [address] by Dr. CANDICE Medrano with a diagnosis of Mood disorder.  Disposition called to called to Komal in admitting.  Xavier was given accepting info on the floor.

## 2024-11-29 NOTE — BH NOTE
4 Eyes Skin Assessment     NAME:  Kerri Woo  YOB: 1950  MEDICAL RECORD NUMBER:  [id number]    The patient is being assessed for  Admission    I agree that at least one RN has performed a thorough Head to Toe Skin Assessment on the patient. ALL assessment sites listed below have been assessed.      Areas assessed by both nurses:    Head, Face, Ears, Shoulders, Back, Chest, Arms, Elbows, Hands, Sacrum. Buttock, Coccyx, Ischium, Legs. Feet and Heels, and Under Medical Devices         Does the Patient have a Wound? Yes wound(s) were present on assessment. LDA wound assessment was Initiated and completed by RN noted superficial cuts on bilateral wrist self inflicted by patient. Left wrist has 2 stitches.       David Prevention initiated by RN: No  Wound Care Orders initiated by RN: No    Pressure Injury (Stage 3,4, Unstageable, DTI, NWPT, and Complex wounds) if present, place Wound referral order by RN under : No    New Ostomies, if present place, Ostomy referral order under : No     Nurse 1 eSignature: Electronically signed by Renata Miranda RN on 11/29/24 at 6:19 PM EST    **SHARE this note so that the co-signing nurse can place an eSignature**    Nurse 2 eSignature: Electronically signed by Nakia Bond RN on 11/29/24 at 6:35 PM EST

## 2024-11-29 NOTE — PLAN OF CARE
Problem: Anxiety  Goal: Will report anxiety at manageable levels  Description: INTERVENTIONS:  1. Administer medication as ordered  2. Teach and rehearse alternative coping skills  3. Provide emotional support with 1:1 interaction with staff  Outcome: Progressing     Problem: Coping  Goal: Pt/Family able to verbalize concerns and demonstrate effective coping strategies  Description: INTERVENTIONS:  1. Assist patient/family to identify coping skills, available support systems and cultural and spiritual values  2. Provide emotional support, including active listening and acknowledgement of concerns of patient and caregivers  3. Reduce environmental stimuli, as able  4. Instruct patient/family in relaxation techniques, as appropriate  5. Assess for spiritual pain/suffering and initiate Spiritual Care, Psychosocial Clinical Specialist consults as needed  Outcome: Progressing     Problem: Decision Making  Goal: Pt/Family able to effectively weigh alternatives and participate in decision making related to treatment and care  Description: INTERVENTIONS:  1. Determine when there are differences between patient's view, family's view, and healthcare provider's view of condition  2. Facilitate patient and family articulation of goals for care  3. Help patient and family identify pros/cons of alternative solutions  4. Provide information as requested by patient/family  5. Respect patient/family right to receive or not to receive information  6. Serve as a liaison between patient and family and health care team  7. Initiate Consults from Ethics, Palliative Care or initiate Family Care Conference as is appropriate  Outcome: Progressing      Yes

## 2024-11-30 ENCOUNTER — HOSPITAL ENCOUNTER (INPATIENT)
Age: 74
LOS: 3 days | Discharge: OTHER INSTITUTION | DRG: 309 | End: 2024-12-03
Attending: INTERNAL MEDICINE | Admitting: INTERNAL MEDICINE
Payer: MEDICARE

## 2024-11-30 ENCOUNTER — APPOINTMENT (OUTPATIENT)
Dept: CT IMAGING | Age: 74
DRG: 309 | End: 2024-11-30
Attending: PSYCHIATRY & NEUROLOGY
Payer: MEDICARE

## 2024-11-30 ENCOUNTER — APPOINTMENT (OUTPATIENT)
Dept: GENERAL RADIOLOGY | Age: 74
DRG: 309 | End: 2024-11-30
Attending: INTERNAL MEDICINE
Payer: MEDICARE

## 2024-11-30 VITALS
TEMPERATURE: 98.5 F | WEIGHT: 120 LBS | SYSTOLIC BLOOD PRESSURE: 136 MMHG | HEART RATE: 70 BPM | HEIGHT: 63 IN | DIASTOLIC BLOOD PRESSURE: 72 MMHG | OXYGEN SATURATION: 100 % | BODY MASS INDEX: 21.26 KG/M2 | RESPIRATION RATE: 16 BRPM

## 2024-11-30 PROBLEM — R41.82 AMS (ALTERED MENTAL STATUS): Status: ACTIVE | Noted: 2024-11-30

## 2024-11-30 LAB
AMMONIA PLAS-SCNC: 14 UMOL/L (ref 11–51)
ANION GAP SERPL CALCULATED.3IONS-SCNC: 12 MMOL/L (ref 7–16)
BASOPHILS # BLD: 0.02 K/UL (ref 0–0.2)
BASOPHILS # BLD: 0.03 K/UL (ref 0–0.2)
BASOPHILS NFR BLD: 0 % (ref 0–2)
BASOPHILS NFR BLD: 0 % (ref 0–2)
BNP SERPL-MCNC: 340 PG/ML (ref 0–450)
BUN SERPL-MCNC: 19 MG/DL (ref 6–23)
CALCIUM SERPL-MCNC: 9.3 MG/DL (ref 8.6–10.2)
CHLORIDE SERPL-SCNC: 101 MMOL/L (ref 98–107)
CO2 SERPL-SCNC: 26 MMOL/L (ref 22–29)
CREAT SERPL-MCNC: 1 MG/DL (ref 0.5–1)
EOSINOPHIL # BLD: 0.03 K/UL (ref 0.05–0.5)
EOSINOPHIL # BLD: 0.03 K/UL (ref 0.05–0.5)
EOSINOPHILS RELATIVE PERCENT: 0 % (ref 0–6)
EOSINOPHILS RELATIVE PERCENT: 0 % (ref 0–6)
ERYTHROCYTE [DISTWIDTH] IN BLOOD BY AUTOMATED COUNT: 13.2 % (ref 11.5–15)
ERYTHROCYTE [DISTWIDTH] IN BLOOD BY AUTOMATED COUNT: 13.2 % (ref 11.5–15)
GFR, ESTIMATED: 61 ML/MIN/1.73M2
GLUCOSE BLD-MCNC: 107 MG/DL (ref 74–99)
GLUCOSE SERPL-MCNC: 124 MG/DL (ref 74–99)
HCT VFR BLD AUTO: 39.7 % (ref 34–48)
HCT VFR BLD AUTO: 40.1 % (ref 34–48)
HGB BLD-MCNC: 13 G/DL (ref 11.5–15.5)
HGB BLD-MCNC: 13.2 G/DL (ref 11.5–15.5)
IMM GRANULOCYTES # BLD AUTO: 0.03 K/UL (ref 0–0.58)
IMM GRANULOCYTES # BLD AUTO: <0.03 K/UL (ref 0–0.58)
IMM GRANULOCYTES NFR BLD: 0 % (ref 0–5)
IMM GRANULOCYTES NFR BLD: 0 % (ref 0–5)
INR PPP: 1.4
LACTATE BLDV-SCNC: 1.5 MMOL/L (ref 0.5–2.2)
LYMPHOCYTES NFR BLD: 0.77 K/UL (ref 1.5–4)
LYMPHOCYTES NFR BLD: 0.83 K/UL (ref 1.5–4)
LYMPHOCYTES RELATIVE PERCENT: 10 % (ref 20–42)
LYMPHOCYTES RELATIVE PERCENT: 11 % (ref 20–42)
MAGNESIUM SERPL-MCNC: 1.8 MG/DL (ref 1.6–2.6)
MCH RBC QN AUTO: 29.9 PG (ref 26–35)
MCH RBC QN AUTO: 30.6 PG (ref 26–35)
MCHC RBC AUTO-ENTMCNC: 32.4 G/DL (ref 32–34.5)
MCHC RBC AUTO-ENTMCNC: 33.2 G/DL (ref 32–34.5)
MCV RBC AUTO: 91.9 FL (ref 80–99.9)
MCV RBC AUTO: 92.2 FL (ref 80–99.9)
MONOCYTES NFR BLD: 0.52 K/UL (ref 0.1–0.95)
MONOCYTES NFR BLD: 0.55 K/UL (ref 0.1–0.95)
MONOCYTES NFR BLD: 7 % (ref 2–12)
MONOCYTES NFR BLD: 7 % (ref 2–12)
NEUTROPHILS NFR BLD: 81 % (ref 43–80)
NEUTROPHILS NFR BLD: 82 % (ref 43–80)
NEUTS SEG NFR BLD: 6.27 K/UL (ref 1.8–7.3)
NEUTS SEG NFR BLD: 6.4 K/UL (ref 1.8–7.3)
PARTIAL THROMBOPLASTIN TIME: 35 SEC (ref 24.5–35.1)
PHOSPHATE SERPL-MCNC: 2.9 MG/DL (ref 2.5–4.5)
PLATELET # BLD AUTO: 156 K/UL (ref 130–450)
PLATELET # BLD AUTO: 159 K/UL (ref 130–450)
PMV BLD AUTO: 11.4 FL (ref 7–12)
PMV BLD AUTO: 12 FL (ref 7–12)
POTASSIUM SERPL-SCNC: 3.9 MMOL/L (ref 3.5–5)
PROTHROMBIN TIME: 15.5 SEC (ref 9.3–12.4)
RBC # BLD AUTO: 4.32 M/UL (ref 3.5–5.5)
RBC # BLD AUTO: 4.35 M/UL (ref 3.5–5.5)
SODIUM SERPL-SCNC: 139 MMOL/L (ref 132–146)
TROPONIN I SERPL HS-MCNC: 14 NG/L (ref 0–9)
TSH SERPL DL<=0.05 MIU/L-ACNC: 1.8 UIU/ML (ref 0.27–4.2)
WBC OTHER # BLD: 7.6 K/UL (ref 4.5–11.5)
WBC OTHER # BLD: 7.9 K/UL (ref 4.5–11.5)

## 2024-11-30 PROCEDURE — 6370000000 HC RX 637 (ALT 250 FOR IP): Performed by: STUDENT IN AN ORGANIZED HEALTH CARE EDUCATION/TRAINING PROGRAM

## 2024-11-30 PROCEDURE — 84484 ASSAY OF TROPONIN QUANT: CPT

## 2024-11-30 PROCEDURE — 70450 CT HEAD/BRAIN W/O DYE: CPT

## 2024-11-30 PROCEDURE — 85610 PROTHROMBIN TIME: CPT

## 2024-11-30 PROCEDURE — 84443 ASSAY THYROID STIM HORMONE: CPT

## 2024-11-30 PROCEDURE — 82140 ASSAY OF AMMONIA: CPT

## 2024-11-30 PROCEDURE — 2580000003 HC RX 258: Performed by: STUDENT IN AN ORGANIZED HEALTH CARE EDUCATION/TRAINING PROGRAM

## 2024-11-30 PROCEDURE — 2060000000 HC ICU INTERMEDIATE R&B

## 2024-11-30 PROCEDURE — 2580000003 HC RX 258: Performed by: INTERNAL MEDICINE

## 2024-11-30 PROCEDURE — 83880 ASSAY OF NATRIURETIC PEPTIDE: CPT

## 2024-11-30 PROCEDURE — 80048 BASIC METABOLIC PNL TOTAL CA: CPT

## 2024-11-30 PROCEDURE — 6370000000 HC RX 637 (ALT 250 FOR IP)

## 2024-11-30 PROCEDURE — G0378 HOSPITAL OBSERVATION PER HR: HCPCS

## 2024-11-30 PROCEDURE — 82947 ASSAY GLUCOSE BLOOD QUANT: CPT

## 2024-11-30 PROCEDURE — 85025 COMPLETE CBC W/AUTO DIFF WBC: CPT

## 2024-11-30 PROCEDURE — 85730 THROMBOPLASTIN TIME PARTIAL: CPT

## 2024-11-30 PROCEDURE — 93005 ELECTROCARDIOGRAM TRACING: CPT

## 2024-11-30 PROCEDURE — 72125 CT NECK SPINE W/O DYE: CPT

## 2024-11-30 PROCEDURE — 84100 ASSAY OF PHOSPHORUS: CPT

## 2024-11-30 PROCEDURE — 83735 ASSAY OF MAGNESIUM: CPT

## 2024-11-30 PROCEDURE — 83605 ASSAY OF LACTIC ACID: CPT

## 2024-11-30 PROCEDURE — 71045 X-RAY EXAM CHEST 1 VIEW: CPT

## 2024-11-30 PROCEDURE — 36415 COLL VENOUS BLD VENIPUNCTURE: CPT

## 2024-11-30 RX ORDER — POTASSIUM CHLORIDE 1500 MG/1
40 TABLET, EXTENDED RELEASE ORAL PRN
Status: CANCELLED | OUTPATIENT
Start: 2024-11-30

## 2024-11-30 RX ORDER — MEMANTINE HYDROCHLORIDE 10 MG/1
5 TABLET ORAL 2 TIMES DAILY
Status: DISCONTINUED | OUTPATIENT
Start: 2024-11-30 | End: 2024-11-30

## 2024-11-30 RX ORDER — MAGNESIUM HYDROXIDE/ALUMINUM HYDROXICE/SIMETHICONE 120; 1200; 1200 MG/30ML; MG/30ML; MG/30ML
30 SUSPENSION ORAL PRN
Status: DISCONTINUED | OUTPATIENT
Start: 2024-11-30 | End: 2024-12-03 | Stop reason: HOSPADM

## 2024-11-30 RX ORDER — POTASSIUM CHLORIDE 7.45 MG/ML
10 INJECTION INTRAVENOUS PRN
Status: CANCELLED | OUTPATIENT
Start: 2024-11-30

## 2024-11-30 RX ORDER — SODIUM CHLORIDE 9 MG/ML
INJECTION, SOLUTION INTRAVENOUS PRN
Status: CANCELLED | OUTPATIENT
Start: 2024-11-30

## 2024-11-30 RX ORDER — ACETAMINOPHEN 650 MG/1
650 SUPPOSITORY RECTAL EVERY 6 HOURS PRN
Status: CANCELLED | OUTPATIENT
Start: 2024-11-30

## 2024-11-30 RX ORDER — DEXTROSE MONOHYDRATE 100 MG/ML
INJECTION, SOLUTION INTRAVENOUS CONTINUOUS PRN
Status: DISCONTINUED | OUTPATIENT
Start: 2024-11-30 | End: 2024-12-03 | Stop reason: HOSPADM

## 2024-11-30 RX ORDER — SODIUM CHLORIDE 0.9 % (FLUSH) 0.9 %
10 SYRINGE (ML) INJECTION PRN
Status: CANCELLED | OUTPATIENT
Start: 2024-11-30

## 2024-11-30 RX ORDER — MAGNESIUM SULFATE IN WATER 40 MG/ML
2000 INJECTION, SOLUTION INTRAVENOUS PRN
Status: CANCELLED | OUTPATIENT
Start: 2024-11-30

## 2024-11-30 RX ORDER — SODIUM CHLORIDE 0.9 % (FLUSH) 0.9 %
5-40 SYRINGE (ML) INJECTION EVERY 12 HOURS SCHEDULED
Status: CANCELLED | OUTPATIENT
Start: 2024-11-30

## 2024-11-30 RX ORDER — PANTOPRAZOLE SODIUM 40 MG/1
40 TABLET, DELAYED RELEASE ORAL DAILY
Status: DISCONTINUED | OUTPATIENT
Start: 2024-11-30 | End: 2024-12-03 | Stop reason: HOSPADM

## 2024-11-30 RX ORDER — SODIUM CHLORIDE 9 MG/ML
INJECTION, SOLUTION INTRAVENOUS CONTINUOUS
Status: ACTIVE | OUTPATIENT
Start: 2024-11-30 | End: 2024-11-30

## 2024-11-30 RX ORDER — GLUCAGON 1 MG/ML
1 KIT INJECTION PRN
Status: DISCONTINUED | OUTPATIENT
Start: 2024-11-30 | End: 2024-12-03 | Stop reason: HOSPADM

## 2024-11-30 RX ORDER — ONDANSETRON 4 MG/1
4 TABLET, ORALLY DISINTEGRATING ORAL EVERY 8 HOURS PRN
Status: DISCONTINUED | OUTPATIENT
Start: 2024-11-30 | End: 2024-12-03 | Stop reason: HOSPADM

## 2024-11-30 RX ORDER — VENLAFAXINE HYDROCHLORIDE 75 MG/1
75 CAPSULE, EXTENDED RELEASE ORAL
Status: DISCONTINUED | OUTPATIENT
Start: 2024-11-30 | End: 2024-12-03 | Stop reason: HOSPADM

## 2024-11-30 RX ORDER — SODIUM CHLORIDE 0.9 % (FLUSH) 0.9 %
5-40 SYRINGE (ML) INJECTION EVERY 12 HOURS SCHEDULED
Status: DISCONTINUED | OUTPATIENT
Start: 2024-11-30 | End: 2024-12-03 | Stop reason: HOSPADM

## 2024-11-30 RX ORDER — SODIUM CHLORIDE 0.9 % (FLUSH) 0.9 %
5-40 SYRINGE (ML) INJECTION PRN
Status: DISCONTINUED | OUTPATIENT
Start: 2024-11-30 | End: 2024-12-03 | Stop reason: HOSPADM

## 2024-11-30 RX ORDER — MIRTAZAPINE 15 MG/1
15 TABLET, FILM COATED ORAL NIGHTLY
Status: DISCONTINUED | OUTPATIENT
Start: 2024-11-30 | End: 2024-12-03 | Stop reason: HOSPADM

## 2024-11-30 RX ORDER — HALOPERIDOL 5 MG/1
5 TABLET ORAL EVERY 6 HOURS PRN
Status: DISCONTINUED | OUTPATIENT
Start: 2024-11-30 | End: 2024-12-03 | Stop reason: HOSPADM

## 2024-11-30 RX ORDER — ONDANSETRON 2 MG/ML
4 INJECTION INTRAMUSCULAR; INTRAVENOUS EVERY 6 HOURS PRN
Status: CANCELLED | OUTPATIENT
Start: 2024-11-30

## 2024-11-30 RX ORDER — ENOXAPARIN SODIUM 100 MG/ML
40 INJECTION SUBCUTANEOUS DAILY
Status: CANCELLED | OUTPATIENT
Start: 2024-11-30

## 2024-11-30 RX ORDER — POLYETHYLENE GLYCOL 3350 17 G/17G
17 POWDER, FOR SOLUTION ORAL DAILY PRN
Status: CANCELLED | OUTPATIENT
Start: 2024-11-30

## 2024-11-30 RX ORDER — SODIUM CHLORIDE 9 MG/ML
INJECTION, SOLUTION INTRAVENOUS PRN
Status: DISCONTINUED | OUTPATIENT
Start: 2024-11-30 | End: 2024-12-03 | Stop reason: HOSPADM

## 2024-11-30 RX ORDER — HALOPERIDOL 5 MG/ML
5 INJECTION INTRAMUSCULAR EVERY 6 HOURS PRN
Status: DISCONTINUED | OUTPATIENT
Start: 2024-11-30 | End: 2024-12-03 | Stop reason: HOSPADM

## 2024-11-30 RX ORDER — MEMANTINE HYDROCHLORIDE 10 MG/1
5 TABLET ORAL 2 TIMES DAILY
Status: CANCELLED | OUTPATIENT
Start: 2024-11-30

## 2024-11-30 RX ORDER — NICOTINE 21 MG/24HR
1 PATCH, TRANSDERMAL 24 HOURS TRANSDERMAL DAILY
Status: DISCONTINUED | OUTPATIENT
Start: 2024-11-30 | End: 2024-12-03 | Stop reason: HOSPADM

## 2024-11-30 RX ORDER — METOPROLOL SUCCINATE 25 MG/1
25 TABLET, EXTENDED RELEASE ORAL DAILY
Status: DISCONTINUED | OUTPATIENT
Start: 2024-11-30 | End: 2024-12-01

## 2024-11-30 RX ORDER — ACETAMINOPHEN 650 MG/1
650 SUPPOSITORY RECTAL EVERY 6 HOURS PRN
Status: DISCONTINUED | OUTPATIENT
Start: 2024-11-30 | End: 2024-12-03 | Stop reason: HOSPADM

## 2024-11-30 RX ORDER — SODIUM CHLORIDE 9 MG/ML
INJECTION, SOLUTION INTRAVENOUS CONTINUOUS
Status: CANCELLED | OUTPATIENT
Start: 2024-11-30

## 2024-11-30 RX ORDER — ACETAMINOPHEN 325 MG/1
650 TABLET ORAL EVERY 6 HOURS PRN
Status: DISCONTINUED | OUTPATIENT
Start: 2024-11-30 | End: 2024-12-03 | Stop reason: HOSPADM

## 2024-11-30 RX ORDER — MEMANTINE HYDROCHLORIDE 10 MG/1
5 TABLET ORAL 2 TIMES DAILY
Status: DISCONTINUED | OUTPATIENT
Start: 2024-11-30 | End: 2024-12-03 | Stop reason: HOSPADM

## 2024-11-30 RX ORDER — VENLAFAXINE HYDROCHLORIDE 75 MG/1
75 CAPSULE, EXTENDED RELEASE ORAL
Status: CANCELLED | OUTPATIENT
Start: 2024-11-30

## 2024-11-30 RX ORDER — ACETAMINOPHEN 325 MG/1
650 TABLET ORAL EVERY 6 HOURS PRN
Status: CANCELLED | OUTPATIENT
Start: 2024-11-30

## 2024-11-30 RX ORDER — SODIUM CHLORIDE 9 MG/ML
INJECTION, SOLUTION INTRAVENOUS CONTINUOUS
Status: DISCONTINUED | OUTPATIENT
Start: 2024-11-30 | End: 2024-12-03

## 2024-11-30 RX ORDER — SODIUM CHLORIDE 0.9 % (FLUSH) 0.9 %
SYRINGE (ML) INJECTION
Status: DISCONTINUED
Start: 2024-11-30 | End: 2024-11-30 | Stop reason: HOSPADM

## 2024-11-30 RX ORDER — POLYETHYLENE GLYCOL 3350 17 G/17G
17 POWDER, FOR SOLUTION ORAL DAILY PRN
Status: DISCONTINUED | OUTPATIENT
Start: 2024-11-30 | End: 2024-12-03 | Stop reason: HOSPADM

## 2024-11-30 RX ORDER — ONDANSETRON 4 MG/1
4 TABLET, ORALLY DISINTEGRATING ORAL EVERY 8 HOURS PRN
Status: CANCELLED | OUTPATIENT
Start: 2024-11-30

## 2024-11-30 RX ORDER — ONDANSETRON 2 MG/ML
4 INJECTION INTRAMUSCULAR; INTRAVENOUS EVERY 6 HOURS PRN
Status: DISCONTINUED | OUTPATIENT
Start: 2024-11-30 | End: 2024-12-03 | Stop reason: HOSPADM

## 2024-11-30 RX ORDER — HYDROXYZINE HYDROCHLORIDE 50 MG/1
50 TABLET, FILM COATED ORAL 3 TIMES DAILY PRN
Status: DISCONTINUED | OUTPATIENT
Start: 2024-11-30 | End: 2024-12-03 | Stop reason: HOSPADM

## 2024-11-30 RX ADMIN — PANTOPRAZOLE SODIUM 40 MG: 40 TABLET, DELAYED RELEASE ORAL at 08:40

## 2024-11-30 RX ADMIN — MEMANTINE 5 MG: 10 TABLET ORAL at 20:46

## 2024-11-30 RX ADMIN — MEMANTINE 5 MG: 10 TABLET ORAL at 08:40

## 2024-11-30 RX ADMIN — SODIUM CHLORIDE: 9 INJECTION, SOLUTION INTRAVENOUS at 18:32

## 2024-11-30 RX ADMIN — MIRTAZAPINE 15 MG: 15 TABLET, FILM COATED ORAL at 20:45

## 2024-11-30 RX ADMIN — VENLAFAXINE HYDROCHLORIDE 75 MG: 75 CAPSULE, EXTENDED RELEASE ORAL at 08:41

## 2024-11-30 RX ADMIN — ONDANSETRON 4 MG: 4 TABLET, ORALLY DISINTEGRATING ORAL at 06:46

## 2024-11-30 RX ADMIN — SODIUM CHLORIDE: 9 INJECTION, SOLUTION INTRAVENOUS at 23:22

## 2024-11-30 RX ADMIN — SODIUM CHLORIDE: 9 INJECTION, SOLUTION INTRAVENOUS at 03:05

## 2024-11-30 ASSESSMENT — PAIN SCALES - GENERAL
PAINLEVEL_OUTOF10: 0
PAINLEVEL_OUTOF10: 0

## 2024-11-30 NOTE — SIGNIFICANT EVENT
Critical Care - Rapid Response Team Note      Date of event: 11/30/2024   Time of event: 01:23    Kerri Woo 74 y.o. year old female   YOB: 1950     PCP:  Castro Boles Jr., MD   Location: [facility]/7408-   Witnessed? : [x]Yes  [] No  Initial Code status: [x] Full  [] DNR-CCA  []DNR-CC    []Limited  ______________________________________________________________________  Reason for RRT:   Loss of consciousness/Syncope    Chief Complaint for this admission: No chief complaint on file.      Admit date:  11/30/2024     Admitting Diagnosis: Altered mental status [R41.82]  AMS (altered mental status) [R41.82]      Current Diagnosis: AMS s/p mechanical fall, scalp laceration     Subjective: RRT was called for mechanical fall. She went to the nurses station and said she took a nap for 3 hours but woke up and was not able to go back to sleep. Nurse handed her a cup of water and melatonin and hydroxyzine. Nurse thought Kerri dropped her cup of water but there was a thud and Kerri had fell and hit the back of her head. She was awake after the fall but was confused. Prior to the fall, nurses said she was alert and oriented x3. Upon arrival she was awake and confused, not sure where she was and how she got there. She had blood on the right parietal area of her skull. On exam, she had a small v shaped laceration 5 mm but was not actively bleeding. Stat CT head and cervical spine w/o contrast were ordered. CT head was negative for acute intracranial bleed and no skull fracture noted. CT cervical spine was negative for fracture. Discharge-readmit orders were placed by on call psychiatrist. She was transferred to intermediate floor. A peripheral IV was established and labs were drawn. She did start to remember why she was in the hospital and became more oriented by the end of the RRT. I spoke with Dr. Guevara's NP who was on call overnight. I informed her of the RRT and the need for admission back  from behavioral unit to intermediate floor.       Objective:   Initial Assessment on arrival:  Vital signs: Temp: 97.2, BP: 103/71, RR: 16, HR: 65 SpO2:100% on RA    Airway and Condition: Conscious, Pulse present, Breathing, and Airway Open/Clear noted    Lungs And Circulation: clear to auscultation bilaterally noted                  Neurologic: initially oriented only to person but not place or time     Other Pertinent Exam Findings: 5 mm v shaped laceration on right parietal area of skull    Initial Interventions: Labs ordered: CBC, CMP, PT/INR, aptt, TSH, lactic, ammonia   Imaging ordered: CT head w/o and CT cervical spine w/o  Meds/Fluids/Rx given:   none         RRT Assessment and Plan:    Kerri Woo is a 74 y.o. female with  has a past medical history of Depression and Post herpetic neuralgia. who was admitted on 11/30/2024 with admitting diagnosis Altered mental status [R41.82]  AMS (altered mental status) [R41.82]  .    RRT was called on 11/30/2024 . Initial assessment and interventions as noted above.     Current problems include:   Fall, suspicious for syncopal episode, rule out symptomatic bradycardia since she did get toprol 25 mg, rule out sick sinus syndrome or orthostatic hypotension, rule out TIA    Plan:   Ordered labs  Ordered EKG  Ordered telemetry monitoring  Held eliquis, may resume in the morning if mentation improves and no concern for ICH  Held toprol, HR was 63  General surgery consult for evaluation of scalp laceration   cc/hr for 5 hours   Orthostatics at 8AM   1:1 constant observer   ?    Code status: [x] Full  [] DNR-CCA  []DNR-CC []Limited    Disposition:  [] No transfer   [x] Transfer to monitor floor  [] Transfer to: [] MICU [] NICU [] CVICU [] SICU    Patient’s family updated:     [x] Yes  [] No   Discussed with:  [x] Critical Care Intensivist: Dr. Banks       [x] Primary Care Provider: Dr. Guevara's on call NP      [] Other: ?    Floresita Watt MD

## 2024-11-30 NOTE — BH NOTE
Patient was in her room,when evening assessment competed. Alert and oriented x 4. Affect blunt, but observed flat,sad and tears forming . Denies suicidal,homicidal or auditory,visual hallucinations. Verbalizes anxiety 8/10 do to ongoing depression so long. Depression 10/10 so depressed can't cry. Verbalized to patient that she does have tearing up. Patient states that is as far as it goes. Arms bilateral fore arms self inflicted cut makers. Open to air. Left arm 2 suture intact. No drainage observed. Noted on left wrist swollen,red warm to touch. Patient states that it is aching and if she bumps it really hurts. Ice pack given and encouraged to keep elevated. Patient verbalizes appetite that she forces her self to eat until she attempted to take her own life. But is now again forcing self to eat. States sleep is good with the Remeron.  No groups attended. Took evening medications with out issues. No behaviors observed or reported. Purposeful rounds continue.

## 2024-11-30 NOTE — PLAN OF CARE
Problem: Discharge Planning  Goal: Discharge to home or other facility with appropriate resources  Outcome: Progressing     Problem: Safety - Adult  Goal: Free from fall injury  Outcome: Progressing     Problem: Self Harm/Suicidality  Goal: Will have no self-injury during hospital stay  Description: INTERVENTIONS:  1.  Ensure constant observer at bedside with Q15M safety checks  2.  Maintain a safe environment  3.  Secure patient belongings  4.  Ensure family/visitors adhere to safety recommendations  5.  Ensure safety tray has been added to patient's diet order  6.  Every shift and PRN: Re-assess suicidal risk via Frequent Screener    Outcome: Progressing

## 2024-11-30 NOTE — PROGRESS NOTES
All belongings sent with patient to [address] [place] [address]. One brown paper bag and one plastic bag. Hearing aid/ and glasses in a case included.

## 2024-11-30 NOTE — PLAN OF CARE
Problem: Anxiety  Goal: Will report anxiety at manageable levels  Description: INTERVENTIONS:  1. Administer medication as ordered  2. Teach and rehearse alternative coping skills  3. Provide emotional support with 1:1 interaction with staff  11/29/2024 2115 by Nakia Bond RN  Outcome: Not Progressing  11/29/2024 2114 by Nakia Bond RN  Outcome: Not Progressing  11/29/2024 1803 by Renata Miranda RN  Outcome: Progressing     Problem: Coping  Goal: Pt/Family able to verbalize concerns and demonstrate effective coping strategies  Description: INTERVENTIONS:  1. Assist patient/family to identify coping skills, available support systems and cultural and spiritual values  2. Provide emotional support, including active listening and acknowledgement of concerns of patient and caregivers  3. Reduce environmental stimuli, as able  4. Instruct patient/family in relaxation techniques, as appropriate  5. Assess for spiritual pain/suffering and initiate Spiritual Care, Psychosocial Clinical Specialist consults as needed  11/29/2024 2115 by Nakia Bond RN  Outcome: Not Progressing  11/29/2024 2114 by Nakia Bond RN  Outcome: Not Progressing  11/29/2024 1803 by Renata Miranda RN  Outcome: Progressing     Problem: Decision Making  Goal: Pt/Family able to effectively weigh alternatives and participate in decision making related to treatment and care  Description: INTERVENTIONS:  1. Determine when there are differences between patient's view, family's view, and healthcare provider's view of condition  2. Facilitate patient and family articulation of goals for care  3. Help patient and family identify pros/cons of alternative solutions  4. Provide information as requested by patient/family  5. Respect patient/family right to receive or not to receive information  6. Serve as a liaison between patient and family and health care team  7. Initiate Consults from Ethics, Palliative Care or initiate Family Care  Conference as is appropriate  11/29/2024 2115 by Nakia Bond RN  Outcome: Not Progressing  11/29/2024 2114 by Nakia Bond RN  Outcome: Not Progressing  11/29/2024 1803 by Renata Miranda RN  Outcome: Progressing     Problem: Confusion  Goal: Confusion, delirium, dementia, or psychosis is improved or at baseline  Description: INTERVENTIONS:  1. Assess for possible contributors to thought disturbance, including medications, impaired vision or hearing, underlying metabolic abnormalities, dehydration, psychiatric diagnoses, and notify attending LIP  2. [place] high risk fall precautions, as indicated  3. Provide frequent short contacts to provide reality reorientation, refocusing and direction  4. Decrease environmental stimuli, including noise as appropriate  5. Monitor and intervene to maintain adequate nutrition, hydration, elimination, sleep and activity  6. If unable to ensure safety without constant attention obtain sitter and review sitter guidelines with assigned personnel  7. Initiate Psychosocial CNS and Spiritual Care consult, as indicated  11/29/2024 2115 by Nakia Bond RN  Outcome: Not Progressing  11/29/2024 2114 by Nakia Bond RN  Outcome: Not Progressing     Problem: Behavior  Goal: Pt/Family maintain appropriate behavior and adhere to behavioral management agreement, if implemented  Description: INTERVENTIONS:  1. Assess patient/family's coping skills and  non-compliant behavior (including use of illegal substances)  2. Notify security of behavior or suspected illegal substances which indicate the need for search of the family and/or belongings  3. Encourage verbalization of thoughts and concerns in a socially appropriate manner  4. Utilize positive, consistent limit setting strategies supporting safety of patient, staff and others  5. Encourage participation in the decision making process about the behavioral management agreement  6. If a visitor's behavior poses a threat to  safety call refer to organization policy.  7. Initiate consult with , Psychosocial CNS, Spiritual Care as appropriate  11/29/2024 2115 by Nakia Bond RN  Outcome: Not Progressing  11/29/2024 2114 by Nakia Bond RN  Outcome: Not Progressing     Problem: Depression/Self Harm  Goal: Effect of psychiatric condition will be minimized and patient will be protected from self harm  Description: INTERVENTIONS:  1. Assess impact of patient's symptoms on level of functioning, self care needs and offer support as indicated  2. Assess patient/family knowledge of depression, impact on illness and need for teaching  3. Provide emotional support, presence and reassurance  4. Assess for possible suicidal thoughts or ideation. If patient expresses suicidal thoughts or statements do not leave alone, initiate Suicide Precautions, move to a room close to the nursing station and obtain sitter  5. Initiate consults as appropriate with Mental Health Professional, Spiritual Care, Psychosocial CNS, and consider a recommendation to the LIP for a Psychiatric Consultation  11/29/2024 2115 by Nakia Bond RN  Outcome: Not Progressing  11/29/2024 2114 by Nakia Bond RN  Outcome: Not Progressing  Flowsheets (Taken 11/29/2024 1915)  Effect of psychiatric condition will be minimized and patient will be protected from self harm: Provide emotional support, presence and reassurance     Problem: Pain  Goal: Verbalizes/displays adequate comfort level or baseline comfort level  11/29/2024 2115 by Nakia Bond RN  Outcome: Not Progressing  11/29/2024 2114 by Nakia Bond RN  Outcome: Not Progressing  Flowsheets (Taken 11/29/2024 2114)  Verbalizes/displays adequate comfort level or baseline comfort level: Encourage patient to monitor pain and request assistance

## 2024-11-30 NOTE — TRANSITION OF CARE
Behavioral Health Transition Record    Patient Name: Kerri Woo  YOB: 1950   Medical Record Number: [id number]  Date of Admission: 11/29/2024  4:46 PM   Date of Discharge: 11/30/2024     Attending Provider: No att. providers found   Discharging Provider: Dr. Medrano  To contact this individual call [phone] and ask the  to page.  If unavailable, ask to be transferred to Behavioral Health Provider on call.  A Behavioral Health Provider will be available on call 24/7 and during holidays.    Primary Care Provider: Castro Boles Jr., MD    Allergies   Allergen Reactions    Betadine [Povidone Iodine]        Reason for Admission: Kerri Woo with a past history of depression treated with Cymbalta. Presented to ER after suicide attempt, due to increased anxiety and medication change to generic Cymbalta. Patient was involuntary admission after being cleared medically for new onset Afib.    Admission Diagnosis: Mood disorder (HCC) [F39]    * No surgery found *    No results found for this visit on 11/29/24.    Immunizations administered during this encounter:   Immunization History   Administered Date(s) Administered    COVID-19, MODERNA BLUE border, Primary or Immunocompromised, (age 12y+), IM, 100 mcg/0.5mL 03/12/2021    TD 5LF, TENIVAC, (age 7y+), IM, 0.5mL 11/25/2024     Influenza Vaccination Status: None of the above/Not documented/Unable to determine from medical record documentation    Screening for Metabolic Disorders for Patients on Antipsychotic Medications  (Data obtained from the EMR)    Estimated Body Mass Index  Body mass index is 21.26 kg/m².      Vital Signs/Blood Pressure  /72   Pulse 70   Temp 98.5 °F (36.9 °C) (Temporal)   Resp 16   Ht 1.6 m (5' 3\")   Wt 54.4 kg (120 lb)   SpO2 100%   BMI 21.26 kg/m²      Fasting Blood Glucose or Hemoglobin A1c  No results found for: \"GLU\", \"GLUCPOC\"    Hemoglobin A1C   Date Value Ref Range Status   04/03/2024 5.6 4.0  - 5.6 % Final       Discharge Diagnosis: Altered mental status    Discharge Plan/Destination: Discharge/readmit to 7408    Discharge Medication List and Instructions:      Medication List        ASK your doctor about these medications      melatonin 3 MG Tabs tablet  Take 1 tablet by mouth nightly as needed (sleep)     memantine 5 MG tablet  Commonly known as: NAMENDA  Take 1 tablet by mouth 2 times daily     omeprazole 20 MG delayed release capsule  Commonly known as: PRILOSEC     pantoprazole 40 MG tablet  Commonly known as: PROTONIX     traZODone 150 MG tablet  Commonly known as: DESYREL     venlafaxine 75 MG extended release capsule  Commonly known as: EFFEXOR XR  Take 1 capsule by mouth daily (with breakfast)              Unresulted Labs (24h ago, onward)      None            To obtain results of studies pending at discharge, please contact [phone]    Follow-up Information    None          Advanced Directive:   Does the patient have an appointed surrogate decision maker? No  Does the patient have a Medical Advance Directive? No  Does the patient have a Psychiatric Advance Directive? No  If the patient does not have a surrogate or Medical Advance Directive AND Psychiatric Advance Directive, the patient was offered information on these advance directives Patient will complete at a later time    Patient Instructions: Please continue all medications until otherwise directed by physician.      Tobacco Cessation Discharge Plan:   Is the patient a tobacco user  and needs referral for tobacco cessation? No patient does not smoke.  Patient referred to the following for tobacco cessation with an appointment? No patient does not smoke.  Patient was offered medication to assist with tobacco cessation at discharge? No patient does not smoke.    Alcohol/Substance Abuse Discharge Plan:   Does the patient have a history of substance/alcohol abuse and requires a referral for treatment? No, noted history of past or

## 2024-11-30 NOTE — PROGRESS NOTES
4 Eyes Skin Assessment     NAME:  Kerri Woo  YOB: 1950  MEDICAL RECORD NUMBER:  [id number]    The patient is being assessed for  {Reason for Assessment:20070}    I agree that at least one RN has performed a thorough Head to Toe Skin Assessment on the patient. ALL assessment sites listed below have been assessed.      Areas assessed by both nurses:    Head, Face, Ears, Shoulders, Back, Chest, Arms, Elbows, Hands, Sacrum. Buttock, Coccyx, Ischium, Legs. Feet and Heels, Under Medical Devices , and Other ***        Does the Patient have a Wound? {Action Wound:06091}       David Prevention initiated by RN: {YES/NO:19726}  Wound Care Orders initiated by RN: {YES/NO:19726}    Pressure Injury (Stage 3,4, Unstageable, DTI, NWPT, and Complex wounds) if present, place Wound referral order by RN under : {YES/NO:19726}    New Ostomies, if present place, Ostomy referral order under : {YES/NO:19726}     Nurse 1 eSignature: Electronically signed by Shavon Thakkar RN on 11/30/24 at 6:56 AM EST    **SHARE this note so that the co-signing nurse can place an eSignature**    Nurse 2 eSignature: {Esignature:[id number]}

## 2024-11-30 NOTE — H&P
[facility]  History and Physical      CHIEF COMPLAINT:    No chief complaint on file.       Patient of Castro Boles Jr., MD presents with:  AMS (altered mental status)    History of Present Illness:   Patient is a 74-year-old female with past medical history of depression, postherpetic neuralgia.  Patient presents to the ED with complaints of she is attempting to harm herself by cutting and she tells me she tried to drown herself in the bathtub but was unable to do so..  Patient states she has been deeply depressed and she does not want to live anymore.  Patient denied any homicidal ideation.  Patient states she has had these ideations in April of this past year.  Patient has seen psychiatry for her depression.  While patient was in the ED patient's heart rate increased and EKG showed new onset a flutter.  Psychiatry has been consulted, cardiology has been consulted and patient is admitted to telemetry unit for further treatment.  On evaluation she is in the behavioral unit, attempted suicide by cutting herself in multiple spots.  She was apparently found to have A-fib with RVR and therefore is being admitted to medicine.  Cardiology is also following.    Patient was ultimately transferred to inpatient psych.  Apparently there was a fall while he was at the nurses station last night and RRT was called and patient subsequently transferred to medical floor for further evaluation.  On my eval today she is sitting up in chair with son and sitter at bedside in no acute distress.  She complains of a goose egg on her head from sustaining her fall.  She does not recall chest pain shortness of breath or any other complaints prior to going down.  She does indicate she felt slightly dizzy.  She admits to not eating and drinking well.    REVIEW OF SYSTEMS:  Pertinent negatives are above in HPI.  10 point ROS otherwise negative.      Past Medical History:   Diagnosis Date    Depression     Post herpetic  cooperative.  Normal judgement and insight.  Normal mood and affect.  Neuro: Alert and interactive, face symmetric, speech fluent.    LABS:  All labs reviewed.  Of note:  CBC with Differential:    Lab Results   Component Value Date/Time    WBC 7.9 11/30/2024 02:39 AM    WBC 7.6 11/30/2024 02:39 AM    RBC 4.32 11/30/2024 02:39 AM    RBC 4.35 11/30/2024 02:39 AM    HGB 13.2 11/30/2024 02:39 AM    HGB 13.0 11/30/2024 02:39 AM    HCT 39.7 11/30/2024 02:39 AM    HCT 40.1 11/30/2024 02:39 AM     11/30/2024 02:39 AM     11/30/2024 02:39 AM    MCV 91.9 11/30/2024 02:39 AM    MCV 92.2 11/30/2024 02:39 AM    MCH 30.6 11/30/2024 02:39 AM    MCH 29.9 11/30/2024 02:39 AM    MCHC 33.2 11/30/2024 02:39 AM    MCHC 32.4 11/30/2024 02:39 AM    RDW 13.2 11/30/2024 02:39 AM    RDW 13.2 11/30/2024 02:39 AM    LYMPHOPCT 11 11/30/2024 02:39 AM    LYMPHOPCT 10 11/30/2024 02:39 AM    MONOPCT 7 11/30/2024 02:39 AM    MONOPCT 7 11/30/2024 02:39 AM    EOSPCT 0 11/30/2024 02:39 AM    EOSPCT 0 11/30/2024 02:39 AM    BASOPCT 0 11/30/2024 02:39 AM    BASOPCT 0 11/30/2024 02:39 AM    MONOSABS 0.55 11/30/2024 02:39 AM    MONOSABS 0.52 11/30/2024 02:39 AM    LYMPHSABS 0.83 11/30/2024 02:39 AM    LYMPHSABS 0.77 11/30/2024 02:39 AM    EOSABS 0.03 11/30/2024 02:39 AM    EOSABS 0.03 11/30/2024 02:39 AM    BASOSABS 0.03 11/30/2024 02:39 AM    BASOSABS 0.02 11/30/2024 02:39 AM     CMP:    Lab Results   Component Value Date/Time     11/30/2024 02:39 AM    K 3.9 11/30/2024 02:39 AM     11/30/2024 02:39 AM    CO2 26 11/30/2024 02:39 AM    BUN 19 11/30/2024 02:39 AM    CREATININE 1.0 11/30/2024 02:39 AM    LABGLOM 61 11/30/2024 02:39 AM    LABGLOM 77 04/01/2024 08:39 PM    GLUCOSE 124 11/30/2024 02:39 AM    CALCIUM 9.3 11/30/2024 02:39 AM    BILITOT 0.3 04/01/2024 08:39 PM    ALKPHOS 88 04/01/2024 08:39 PM    AST 13 04/01/2024 08:39 PM    ALT 11 04/01/2024 08:39 PM       Imaging:  CXR: No acute process.    EKG:  I've personally

## 2024-11-30 NOTE — BH NOTE
Patient at desk requesting if has anything else or sleep. Suggested prn Melatonin and Atarax. Patient was agreeable to those medications. This nurse went and pulled medications Provided. Came back to nursing desk handed patient cup of water. Patient gave date of birth and was going to scan band, I thought patient had dropped the water on floor and patient started leaning to left, patient fell to floor hitting head. Patient was quickly assessed and back of head found to be bleeding. RRT called.    02:09 NP Veronica Mcmillan notified of incident and orders received to discharge/readmit.

## 2024-12-01 LAB
ANION GAP SERPL CALCULATED.3IONS-SCNC: 6 MMOL/L (ref 7–16)
BASOPHILS # BLD: 0.03 K/UL (ref 0–0.2)
BASOPHILS NFR BLD: 1 % (ref 0–2)
BUN SERPL-MCNC: 16 MG/DL (ref 6–23)
CALCIUM SERPL-MCNC: 8.5 MG/DL (ref 8.6–10.2)
CHLORIDE SERPL-SCNC: 110 MMOL/L (ref 98–107)
CO2 SERPL-SCNC: 25 MMOL/L (ref 22–29)
CREAT SERPL-MCNC: 0.8 MG/DL (ref 0.5–1)
EOSINOPHIL # BLD: 0.04 K/UL (ref 0.05–0.5)
EOSINOPHILS RELATIVE PERCENT: 1 % (ref 0–6)
ERYTHROCYTE [DISTWIDTH] IN BLOOD BY AUTOMATED COUNT: 13.3 % (ref 11.5–15)
GFR, ESTIMATED: 72 ML/MIN/1.73M2
GLUCOSE SERPL-MCNC: 96 MG/DL (ref 74–99)
HCT VFR BLD AUTO: 36.6 % (ref 34–48)
HGB BLD-MCNC: 12 G/DL (ref 11.5–15.5)
IMM GRANULOCYTES # BLD AUTO: <0.03 K/UL (ref 0–0.58)
IMM GRANULOCYTES NFR BLD: 0 % (ref 0–5)
LYMPHOCYTES NFR BLD: 1.08 K/UL (ref 1.5–4)
LYMPHOCYTES RELATIVE PERCENT: 23 % (ref 20–42)
MCH RBC QN AUTO: 30.4 PG (ref 26–35)
MCHC RBC AUTO-ENTMCNC: 32.8 G/DL (ref 32–34.5)
MCV RBC AUTO: 92.7 FL (ref 80–99.9)
MONOCYTES NFR BLD: 0.72 K/UL (ref 0.1–0.95)
MONOCYTES NFR BLD: 15 % (ref 2–12)
NEUTROPHILS NFR BLD: 60 % (ref 43–80)
NEUTS SEG NFR BLD: 2.82 K/UL (ref 1.8–7.3)
PLATELET, FLUORESCENCE: 138 K/UL (ref 130–450)
PMV BLD AUTO: 11.8 FL (ref 7–12)
POTASSIUM SERPL-SCNC: 4 MMOL/L (ref 3.5–5)
RBC # BLD AUTO: 3.95 M/UL (ref 3.5–5.5)
SODIUM SERPL-SCNC: 141 MMOL/L (ref 132–146)
WBC OTHER # BLD: 4.7 K/UL (ref 4.5–11.5)

## 2024-12-01 PROCEDURE — 6370000000 HC RX 637 (ALT 250 FOR IP): Performed by: NURSE PRACTITIONER

## 2024-12-01 PROCEDURE — 2060000000 HC ICU INTERMEDIATE R&B

## 2024-12-01 PROCEDURE — 6370000000 HC RX 637 (ALT 250 FOR IP): Performed by: STUDENT IN AN ORGANIZED HEALTH CARE EDUCATION/TRAINING PROGRAM

## 2024-12-01 PROCEDURE — 36415 COLL VENOUS BLD VENIPUNCTURE: CPT

## 2024-12-01 PROCEDURE — 2580000003 HC RX 258: Performed by: INTERNAL MEDICINE

## 2024-12-01 PROCEDURE — 99233 SBSQ HOSP IP/OBS HIGH 50: CPT | Performed by: INTERNAL MEDICINE

## 2024-12-01 PROCEDURE — 85025 COMPLETE CBC W/AUTO DIFF WBC: CPT

## 2024-12-01 PROCEDURE — 6360000002 HC RX W HCPCS: Performed by: INTERNAL MEDICINE

## 2024-12-01 PROCEDURE — 6370000000 HC RX 637 (ALT 250 FOR IP)

## 2024-12-01 PROCEDURE — 2580000003 HC RX 258: Performed by: STUDENT IN AN ORGANIZED HEALTH CARE EDUCATION/TRAINING PROGRAM

## 2024-12-01 PROCEDURE — 2500000003 HC RX 250 WO HCPCS: Performed by: INTERNAL MEDICINE

## 2024-12-01 PROCEDURE — 80048 BASIC METABOLIC PNL TOTAL CA: CPT

## 2024-12-01 RX ORDER — METOPROLOL SUCCINATE 25 MG/1
25 TABLET, EXTENDED RELEASE ORAL DAILY
Status: DISCONTINUED | OUTPATIENT
Start: 2024-12-01 | End: 2024-12-03 | Stop reason: HOSPADM

## 2024-12-01 RX ORDER — MIDODRINE HYDROCHLORIDE 2.5 MG/1
2.5 TABLET ORAL
Status: DISCONTINUED | OUTPATIENT
Start: 2024-12-01 | End: 2024-12-03 | Stop reason: HOSPADM

## 2024-12-01 RX ADMIN — AMIODARONE HYDROCHLORIDE 150 MG: 50 INJECTION, SOLUTION INTRAVENOUS at 20:42

## 2024-12-01 RX ADMIN — MEMANTINE 5 MG: 10 TABLET ORAL at 08:22

## 2024-12-01 RX ADMIN — SODIUM CHLORIDE, PRESERVATIVE FREE 10 ML: 5 INJECTION INTRAVENOUS at 20:34

## 2024-12-01 RX ADMIN — APIXABAN 5 MG: 5 TABLET, FILM COATED ORAL at 10:50

## 2024-12-01 RX ADMIN — APIXABAN 5 MG: 5 TABLET, FILM COATED ORAL at 20:34

## 2024-12-01 RX ADMIN — VENLAFAXINE HYDROCHLORIDE 75 MG: 75 CAPSULE, EXTENDED RELEASE ORAL at 08:26

## 2024-12-01 RX ADMIN — SODIUM CHLORIDE: 9 INJECTION, SOLUTION INTRAVENOUS at 10:09

## 2024-12-01 RX ADMIN — AMIODARONE HYDROCHLORIDE 1 MG/MIN: 1.8 INJECTION, SOLUTION INTRAVENOUS at 20:58

## 2024-12-01 RX ADMIN — METOPROLOL SUCCINATE 25 MG: 25 TABLET, EXTENDED RELEASE ORAL at 10:50

## 2024-12-01 RX ADMIN — PANTOPRAZOLE SODIUM 40 MG: 40 TABLET, DELAYED RELEASE ORAL at 08:22

## 2024-12-01 RX ADMIN — MIRTAZAPINE 15 MG: 15 TABLET, FILM COATED ORAL at 20:34

## 2024-12-01 RX ADMIN — MEMANTINE 5 MG: 10 TABLET ORAL at 20:34

## 2024-12-01 NOTE — PLAN OF CARE
Problem: Discharge Planning  Goal: Discharge to home or other facility with appropriate resources  12/1/2024 0135 by Shira Dubois RN  Outcome: Progressing  11/30/2024 1206 by Lucero Dunbar RN  Outcome: Progressing  Flowsheets (Taken 11/30/2024 0800)  Discharge to home or other facility with appropriate resources:   Identify barriers to discharge with patient and caregiver   Arrange for needed discharge resources and transportation as appropriate     Problem: Safety - Adult  Goal: Free from fall injury  12/1/2024 0135 by Shira Dubois RN  Outcome: Progressing  11/30/2024 1206 by Lucero Dunbar RN  Outcome: Progressing  Flowsheets (Taken 11/30/2024 0800)  Free From Fall Injury: Instruct family/caregiver on patient safety     Problem: Self Harm/Suicidality  Goal: Will have no self-injury during hospital stay  12/1/2024 0135 by Shira Dubois RN  Outcome: Progressing  11/30/2024 1206 by Lucero Dunbar RN  Outcome: Progressing

## 2024-12-01 NOTE — CONSULTS
Department of General Surgery - Adult  Surgical Service   Attending Consult Note      Reason for Consult:  scalp laceration  Requesting Physician:      CHIEF COMPLAINT:  fall    History Obtained From:  patient    HISTORY OF PRESENT ILLNESS:                The patient is a 74 y.o. female who presents with fall. General surgery is consulted for a scalp laceration. She denies any pain or bleeding at this time. She underwent cross sectional imaging which was reviewed.    Past Medical History:        Diagnosis Date    Depression     Post herpetic neuralgia      Past Surgical History:        Procedure Laterality Date    BLADDER SUSPENSION      CHOLECYSTECTOMY      HERNIA REPAIR      VARICOSE VEIN SURGERY       Current Medications:   Current Facility-Administered Medications: sodium chloride flush 0.9 % injection 5-40 mL, 5-40 mL, IntraVENous, 2 times per day  sodium chloride flush 0.9 % injection 5-40 mL, 5-40 mL, IntraVENous, PRN  0.9 % sodium chloride infusion, , IntraVENous, PRN  ondansetron (ZOFRAN-ODT) disintegrating tablet 4 mg, 4 mg, Oral, Q8H PRN **OR** ondansetron (ZOFRAN) injection 4 mg, 4 mg, IntraVENous, Q6H PRN  polyethylene glycol (GLYCOLAX) packet 17 g, 17 g, Oral, Daily PRN  acetaminophen (TYLENOL) tablet 650 mg, 650 mg, Oral, Q6H PRN **OR** acetaminophen (TYLENOL) suppository 650 mg, 650 mg, Rectal, Q6H PRN  venlafaxine (EFFEXOR XR) extended release capsule 75 mg, 75 mg, Oral, Daily with breakfast  melatonin tablet 3 mg, 3 mg, Oral, Nightly PRN  glucose chewable tablet 16 g, 4 tablet, Oral, PRN  dextrose bolus 10% 125 mL, 125 mL, IntraVENous, PRN **OR** dextrose bolus 10% 250 mL, 250 mL, IntraVENous, PRN  glucagon injection 1 mg, 1 mg, SubCUTAneous, PRN  dextrose 10 % infusion, , IntraVENous, Continuous PRN  haloperidol (HALDOL) tablet 5 mg, 5 mg, Oral, Q6H PRN **OR** haloperidol lactate (HALDOL) injection 5 mg, 5 mg, IntraMUSCular, Q6H PRN  hydrOXYzine HCl (ATARAX) tablet 50 mg, 50 mg, Oral, TID  PRN  memantine (NAMENDA) tablet 5 mg, 5 mg, Oral, BID  [Held by provider] metoprolol succinate (TOPROL XL) extended release tablet 25 mg, 25 mg, Oral, Daily  mirtazapine (REMERON) tablet 15 mg, 15 mg, Oral, Nightly  nicotine (NICODERM CQ) 21 MG/24HR 1 patch, 1 patch, TransDERmal, Daily  pantoprazole (PROTONIX) tablet 40 mg, 40 mg, Oral, Daily  aluminum & magnesium hydroxide-simethicone (MAALOX) 200-200-20 MG/5ML suspension 30 mL, 30 mL, Oral, PRN  0.9 % sodium chloride infusion, , IntraVENous, Continuous  Allergies:  Betadine [povidone iodine]    Social History:   TOBACCO:   reports that she has never smoked. She has never used smokeless tobacco.  ETOH:   reports that she does not currently use alcohol.  DRUGS:   reports no history of drug use.  Family History:   No family history on file.    REVIEW OF SYSTEMS:    Negative with exception of HPI    PHYSICAL EXAM:    VITALS:  /61   Pulse 76   Temp 97.4 °F (36.3 °C) (Temporal)   Resp 18   Ht 1.6 m (5' 2.99\")   Wt 55 kg (121 lb 4.1 oz)   SpO2 96%   BMI 21.49 kg/m²     General: No acute distress, AAOx3  Eyes: Extraocular movements intact, no scleral icterus  Respiratory: Normal work of breathing, no cyanosis  Cardiovascular: Normal capillary refill, 2+ radial pulse  Abdomen: Soft nontender to palpation nondistended  Skin: Normal skin turgor, no rashes, small scabbed scalp laceration on right temporal area.  Extremities: No deformities, no contractures  Neurologic: No focal neurologic deficits, AAO x3      DATA:    CBC with Differential:    Lab Results   Component Value Date/Time    WBC 4.7 12/01/2024 06:25 AM    RBC 3.95 12/01/2024 06:25 AM    HGB 12.0 12/01/2024 06:25 AM    HCT 36.6 12/01/2024 06:25 AM     11/30/2024 02:39 AM     11/30/2024 02:39 AM    MCV 92.7 12/01/2024 06:25 AM    MCH 30.4 12/01/2024 06:25 AM    MCHC 32.8 12/01/2024 06:25 AM    RDW 13.3 12/01/2024 06:25 AM    LYMPHOPCT 23 12/01/2024 06:25 AM    MONOPCT 15 12/01/2024 06:25

## 2024-12-01 NOTE — PLAN OF CARE
Problem: Discharge Planning  Goal: Discharge to home or other facility with appropriate resources  12/1/2024 1319 by Lcuero Dunbar RN  Outcome: Progressing  12/1/2024 0135 by Shira Dubois RN  Outcome: Progressing     Problem: Safety - Adult  Goal: Free from fall injury  12/1/2024 1319 by Lucero Dunbar RN  Outcome: Progressing  12/1/2024 0135 by Shira Dubois RN  Outcome: Progressing     Problem: Self Harm/Suicidality  Goal: Will have no self-injury during hospital stay  Description: INTERVENTIONS:  1.  Ensure constant observer at bedside with Q15M safety checks  2.  Maintain a safe environment  3.  Secure patient belongings  4.  Ensure family/visitors adhere to safety recommendations  5.  Ensure safety tray has been added to patient's diet order  6.  Every shift and PRN: Re-assess suicidal risk via Frequent Screener    12/1/2024 1319 by Lucero Dunbar RN  Outcome: Progressing  12/1/2024 0135 by Shira Dubois RN  Outcome: Progressing

## 2024-12-01 NOTE — PROGRESS NOTES
[facility]   Progress note      Subjective:    The patient is awake and alert.    No acute complaints  Heart rate was elevated this morning prior to getting meds  Objective:    /79   Pulse 99   Temp 96.9 °F (36.1 °C) (Temporal)   Resp 18   Ht 1.6 m (5' 2.99\")   Wt 55 kg (121 lb 4.1 oz)   SpO2 99%   BMI 21.49 kg/m²     In: 1734.8 [P.O.:240; I.V.:1494.8]  Out: -   In: 1734.8   Out: -     General appearance: NAD, conversant  HEENT: AT/NC, MMM  Neck: FROM, supple  Lungs: Clear to auscultation  CV: Irregularly irregular jumping from 90-1 30 on evaluation, no MRGs  Vasc: Radial pulses 2+  Abdomen: Soft, non-tender; no masses or HSM  Extremities: No peripheral edema or digital cyanosis  Skin: no rash, lesions or ulcers  Psych: Alert and oriented to person, place and time  Neuro: Alert and interactive     Recent Labs     11/29/24 0620 11/30/24 0239 12/01/24  0625   WBC 6.7 7.6  7.9 4.7   HGB 13.8 13.0  13.2 12.0   HCT 42.4 40.1  39.7 36.6    159  156  --        Recent Labs     11/29/24 0620 11/30/24 0239 12/01/24  0625    139 141   K 4.6 3.9 4.0    101 110*   CO2 28 26 25   BUN 15 19 16   CREATININE 1.0 1.0 0.8   CALCIUM 9.6 9.3 8.5*       Assessment:    Principal Problem:    AMS (altered mental status)  Resolved Problems:    * No resolved hospital problems. *      Plan:    74-year-old female presents to the ED with complaints of suicidal ideation and is admitted to telemetry unit with     New onset A-fib  Telemetry monitoring  Rate control medications  Lovenox 1 mg/kilogram twice daily-can be transition to p.o. Eliquis 5 twice daily  Check TSH  Cardiology consulted  rate is adequately controlled on my evaluation currently, she she is cleared from medicine standpoint to be transferred to inpatient psych-no need for medicine admission     Attempted suicide with plan and action taken  CO at bedside, daughter at bedside on my evaluation  Consult psychiatry  [place]  slipped-72-hour hold  She denies taking any medications and her suicide attempt but did cut herself in multiple places including wrists and arms and abdomen        11/30/2024  Transferred to medical floor secondary to fall  No loss of consciousness according to patient  IV fluids  Monitor on telemetry overnight to ensure no further issues such as bradycardia  No evidence of TIA or stroke  Check orthostatics  From medicine standpoint she can be transferred back to inpatient psych tomorrow    12/1/2024  Heart rate is still fluctuating between 90s and 130s-on Toprol-XL 25 daily  Toprol-XL is likely the reason that she sustained a near syncope/syncopal episode on psych floor  We will ask for EP input for better rate control without antihypertensive effects  Once meds adjusted and heart rate is improved, she can transfer back to inpatient psych  Labs and vitals are unremarkable    Code Status: Full  Consultants: General surgery, EP  DVT Prophylaxis   PT/OT  Discharge planning         I have spent a total time of 25 minutes of this patient encounter reviewing chart, labs, radiological reports coordinating care with interdisciplinary teams, face to face encounter with patient, providing counseling/education to patient/family, and formulating plan.       Chely Guevara MD  5:02 PM  12/1/2024

## 2024-12-01 NOTE — PROGRESS NOTES
[facility]- [facility]  Inpatient progress note  PATIENT: Kerri Woo  MEDICAL RECORD NUMBER: [id number]  DATE OF SERVICE:  12/1/2024  ATTENDING ELECTROPHYSIOLOGIST: Mariaa Mcdonnell MD   PRIMARY ELECTROPHYSIOLOGIST: Mariaa Mcdonnell MD   REFERRING PHYSICIAN:  Castro Boles Jr., MD  CHIEF COMPLAINT: Suicidal attempt, syncope    HPI: This is a 74 y.o. female with a history of bladder incontinence, constipation, prediabetes, GERD, depression, questionable dementia.  She has no cardiac history and has never seen a cardiologist/electrophysiologist.  At home she is managed on Namenda, Prilosec, trazodone and Effexor.    She was previously admitted for suicidal ideation April 2024 at that time she was found to be in normal sinus rhythm.  Patient reports that she was at home with her  is typically functionally independent and able to complete her ADLs without limitation of chest pain, shortness of breath.  Over the last 3 months she has been noted progressive dyspnea upon exertion, and inability to complete her activities of daily living due to activity intolerance.  She also notes palpitations that have been going on for several months lasting seconds to minutes without associated symptoms typically with activity.    On 11/25/2024 the patient was at home when she attempted suicide by drowning herself in the bathtub and slitting her wrists.  After she attempted this she changed her mind and attempted to bandage her wounds when she  had syncope and was seen by her  with EMS services being notified.     She was seen in emergency department initial EKG revealed atrial fibs/flutter with a second EKG revealing sinus rhythm.  She was seen inpatient by Dr. Osborne who noted new onset atrial fled her on low-dose Toprol 12.5 Mg twice daily as well as Eliquis.  On hospital day 2 at 930 the patient converted from sinus rhythm to atrial flutter with an  Susan Simmons APRN - CNP   40 mg at 12/01/24 0822    aluminum & magnesium hydroxide-simethicone (MAALOX) 200-200-20 MG/5ML suspension 30 mL  30 mL Oral PRN Susan Simmons APRN - CNP        0.9 % sodium chloride infusion   IntraVENous Continuous Chely Guevara  mL/hr at 12/01/24 1009 New Bag at 12/01/24 1009        Allergies   Allergen Reactions    Betadine [Povidone Iodine]        ROS:   Constitutional: Negative for fever,  and appetite change.  Positive for activity change  HENT: Negative for epistaxis.   Eyes: Negative for diploplia, blurred vision.   Respiratory: Negative for cough, chest tightness,  and wheezing.  Positive shortness of breath  Cardiovascular: pertinent positives in HPI  Gastrointestinal: Negative for abdominal pain and blood in stool.   All other review of systems are negative     PHYSICAL EXAM:   Vitals:    11/30/24 2245 12/01/24 0300 12/01/24 0815 12/01/24 1045   BP: (!) 148/47 (!) 161/74 125/61 124/79   Pulse: 71 74 76 99   Resp: 20 18 18 18   Temp:   97.4 °F (36.3 °C) 96.9 °F (36.1 °C)   TempSrc:   Temporal Temporal   SpO2: 97% 95% 96% 99%   Weight:       Height:          Constitutional: Well-developed, no acute distress, seems upbeat and in a better mood compared to 11/29/2024.  Eyes: conjunctivae normal, no xanthelasma   Ears, Nose, Throat: oral mucosa moist, no cyanosis   CV: no JVD or leg edema, laterally displaced PMI, regular rate and rhythm. Normal S1S2 and no S3.  No gallops or murmurs noted  Lungs: clear to auscultation bilaterally, normal respiratory effort without used of accessory muscles  Abdomen: soft, non-tender, nondistended  Musculoskeletal: no digital clubbing, no edema   Skin: warm, no rashes     I have personally reviewed the laboratory, cardiac diagnostic and radiographic testing as outlined below:    Data:    Recent Labs     11/29/24  0620 11/30/24  0239 12/01/24  0625   WBC 6.7 7.6  7.9 4.7   HGB 13.8 13.0  13.2 12.0   HCT 42.4 40.1  39.7 36.6    159   156  --      Recent Labs     11/29/24  0620 11/30/24  0239 12/01/24  0625    139 141   K 4.6 3.9 4.0    101 110*   CO2 28 26 25   BUN 15 19 16   CREATININE 1.0 1.0 0.8   CALCIUM 9.6 9.3 8.5*      Lab Results   Component Value Date/Time    MG 1.8 11/30/2024 02:39 AM     Recent Labs     11/30/24  0239   TSH 1.80       Recent Labs     11/30/24  0239   INR 1.4       CXR: No acute process.     Telemetry: N/A     EKG 11/25/2024: Atrial fibrillation with a rate of 108 bpm, QRS 66 QTc 479ms  Please see scan in Cardiology.    EKG 11/25/2024: NSR rate 60 bpm, IN 188ms QRS 58ms QTc 426ms   EKG 11/30/2020 : Sinus rhythm  Echocardiogram:   11/29/24  Interpretation Summary  Show Result Comparison     Left Ventricle: Normal left ventricular systolic function with a visually estimated EF of 60 - 65%. Left ventricle size is normal. Normal wall thickness. Normal wall motion. Grade I diastolic dysfunction with normal LAP.    Aortic Valve: Mild sclerosis of the aortic valve cusps.    Mitral Valve: Mild annular calcification. Mild stenosis noted.    Tricuspid Valve: Mild regurgitation.    Interatrial Septum: PFO present with a right to left shunt visible by color Doppler. Hypermobile interatrial septum.    Image quality is fair.     Comparison Study Information    Prior Study    There is no prior study available for comparison     Echo Findings    Left Ventricle Normal left ventricular systolic function with a visually estimated EF of 60 - 65%. Left ventricle size is normal. Normal wall thickness. Normal wall motion. Grade I diastolic dysfunction with normal LAP.   Left Atrium Left atrium size is normal.   Interatrial Septum PFO present with a right to left shunt visible by color Doppler. Hypermobile interatrial septum.   Right Ventricle Right ventricle size is normal. Normal systolic function.   Right Atrium Right atrium size is normal.   Aortic Valve Mild sclerosis of the aortic valve cusps. No regurgitation. No  for allowing me to participate in your patient's care.  Please call me if there are any questions or concerns.        Mariaa Mcdonnell MD   Cardiac Electrophysiology  [facility]  [facility] [place]  4:29 PM  12/1/2024

## 2024-12-02 PROBLEM — I48.0 PAROXYSMAL ATRIAL FIBRILLATION (HCC): Status: ACTIVE | Noted: 2024-12-02

## 2024-12-02 LAB
ANION GAP SERPL CALCULATED.3IONS-SCNC: 8 MMOL/L (ref 7–16)
BASOPHILS # BLD: 0.03 K/UL (ref 0–0.2)
BASOPHILS NFR BLD: 0 % (ref 0–2)
BUN SERPL-MCNC: 17 MG/DL (ref 6–23)
CALCIUM SERPL-MCNC: 8.6 MG/DL (ref 8.6–10.2)
CHLORIDE SERPL-SCNC: 107 MMOL/L (ref 98–107)
CO2 SERPL-SCNC: 24 MMOL/L (ref 22–29)
CREAT SERPL-MCNC: 0.9 MG/DL (ref 0.5–1)
EOSINOPHIL # BLD: 0.06 K/UL (ref 0.05–0.5)
EOSINOPHILS RELATIVE PERCENT: 1 % (ref 0–6)
ERYTHROCYTE [DISTWIDTH] IN BLOOD BY AUTOMATED COUNT: 13.8 % (ref 11.5–15)
GFR, ESTIMATED: 70 ML/MIN/1.73M2
GLUCOSE SERPL-MCNC: 103 MG/DL (ref 74–99)
HCT VFR BLD AUTO: 38.2 % (ref 34–48)
HGB BLD-MCNC: 12.2 G/DL (ref 11.5–15.5)
IMM GRANULOCYTES # BLD AUTO: 0.03 K/UL (ref 0–0.58)
IMM GRANULOCYTES NFR BLD: 0 % (ref 0–5)
LYMPHOCYTES NFR BLD: 2 K/UL (ref 1.5–4)
LYMPHOCYTES RELATIVE PERCENT: 26 % (ref 20–42)
MCH RBC QN AUTO: 30.4 PG (ref 26–35)
MCHC RBC AUTO-ENTMCNC: 31.9 G/DL (ref 32–34.5)
MCV RBC AUTO: 95.3 FL (ref 80–99.9)
MONOCYTES NFR BLD: 1.11 K/UL (ref 0.1–0.95)
MONOCYTES NFR BLD: 15 % (ref 2–12)
NEUTROPHILS NFR BLD: 57 % (ref 43–80)
NEUTS SEG NFR BLD: 4.35 K/UL (ref 1.8–7.3)
PLATELET # BLD AUTO: 134 K/UL (ref 130–450)
PMV BLD AUTO: 11.8 FL (ref 7–12)
POTASSIUM SERPL-SCNC: 4.4 MMOL/L (ref 3.5–5)
RBC # BLD AUTO: 4.01 M/UL (ref 3.5–5.5)
SODIUM SERPL-SCNC: 139 MMOL/L (ref 132–146)
WBC OTHER # BLD: 7.6 K/UL (ref 4.5–11.5)

## 2024-12-02 PROCEDURE — 2500000003 HC RX 250 WO HCPCS: Performed by: INTERNAL MEDICINE

## 2024-12-02 PROCEDURE — 2580000003 HC RX 258: Performed by: STUDENT IN AN ORGANIZED HEALTH CARE EDUCATION/TRAINING PROGRAM

## 2024-12-02 PROCEDURE — 85025 COMPLETE CBC W/AUTO DIFF WBC: CPT

## 2024-12-02 PROCEDURE — 6370000000 HC RX 637 (ALT 250 FOR IP): Performed by: INTERNAL MEDICINE

## 2024-12-02 PROCEDURE — 36415 COLL VENOUS BLD VENIPUNCTURE: CPT

## 2024-12-02 PROCEDURE — 99221 1ST HOSP IP/OBS SF/LOW 40: CPT

## 2024-12-02 PROCEDURE — 99233 SBSQ HOSP IP/OBS HIGH 50: CPT | Performed by: INTERNAL MEDICINE

## 2024-12-02 PROCEDURE — 2580000003 HC RX 258: Performed by: INTERNAL MEDICINE

## 2024-12-02 PROCEDURE — 6370000000 HC RX 637 (ALT 250 FOR IP): Performed by: NURSE PRACTITIONER

## 2024-12-02 PROCEDURE — 6370000000 HC RX 637 (ALT 250 FOR IP): Performed by: STUDENT IN AN ORGANIZED HEALTH CARE EDUCATION/TRAINING PROGRAM

## 2024-12-02 PROCEDURE — 2060000000 HC ICU INTERMEDIATE R&B

## 2024-12-02 PROCEDURE — 80048 BASIC METABOLIC PNL TOTAL CA: CPT

## 2024-12-02 PROCEDURE — 6370000000 HC RX 637 (ALT 250 FOR IP)

## 2024-12-02 RX ADMIN — APIXABAN 5 MG: 5 TABLET, FILM COATED ORAL at 09:10

## 2024-12-02 RX ADMIN — MIDODRINE HYDROCHLORIDE 2.5 MG: 2.5 TABLET ORAL at 11:48

## 2024-12-02 RX ADMIN — HALOPERIDOL 5 MG: 5 TABLET ORAL at 09:10

## 2024-12-02 RX ADMIN — MEMANTINE 5 MG: 10 TABLET ORAL at 20:01

## 2024-12-02 RX ADMIN — SODIUM CHLORIDE, PRESERVATIVE FREE 10 ML: 5 INJECTION INTRAVENOUS at 20:03

## 2024-12-02 RX ADMIN — SODIUM CHLORIDE: 9 INJECTION, SOLUTION INTRAVENOUS at 16:46

## 2024-12-02 RX ADMIN — PANTOPRAZOLE SODIUM 40 MG: 40 TABLET, DELAYED RELEASE ORAL at 09:09

## 2024-12-02 RX ADMIN — SODIUM CHLORIDE: 9 INJECTION, SOLUTION INTRAVENOUS at 09:13

## 2024-12-02 RX ADMIN — MIDODRINE HYDROCHLORIDE 2.5 MG: 2.5 TABLET ORAL at 16:46

## 2024-12-02 RX ADMIN — SODIUM CHLORIDE, PRESERVATIVE FREE 10 ML: 5 INJECTION INTRAVENOUS at 09:12

## 2024-12-02 RX ADMIN — VENLAFAXINE HYDROCHLORIDE 75 MG: 75 CAPSULE, EXTENDED RELEASE ORAL at 09:11

## 2024-12-02 RX ADMIN — MIRTAZAPINE 15 MG: 15 TABLET, FILM COATED ORAL at 20:01

## 2024-12-02 RX ADMIN — AMIODARONE HYDROCHLORIDE 0.5 MG/MIN: 1.8 INJECTION, SOLUTION INTRAVENOUS at 03:02

## 2024-12-02 RX ADMIN — HYDROXYZINE HYDROCHLORIDE 50 MG: 50 TABLET, FILM COATED ORAL at 09:10

## 2024-12-02 RX ADMIN — MIDODRINE HYDROCHLORIDE 2.5 MG: 2.5 TABLET ORAL at 09:09

## 2024-12-02 RX ADMIN — APIXABAN 5 MG: 5 TABLET, FILM COATED ORAL at 20:01

## 2024-12-02 RX ADMIN — MEMANTINE 5 MG: 10 TABLET ORAL at 09:09

## 2024-12-02 ASSESSMENT — PAIN SCALES - GENERAL
PAINLEVEL_OUTOF10: 0

## 2024-12-02 NOTE — PROGRESS NOTES
[facility]   Progress note      Subjective:    The patient is awake and alert.    No acute complaints    Objective:    BP (!) 110/59   Pulse 58   Temp 96.9 °F (36.1 °C) (Temporal)   Resp 16   Ht 1.6 m (5' 2.99\")   Wt 55 kg (121 lb 4.1 oz)   SpO2 97%   BMI 21.49 kg/m²     In: 710 [P.O.:710]  Out: -   In: 710   Out: -     General appearance: NAD, conversant  HEENT: AT/NC, MMM  Neck: FROM, supple  Lungs: Clear to auscultation  CV: Irregularly irregular jumping from 90-1 30 on evaluation, no MRGs  Vasc: Radial pulses 2+  Abdomen: Soft, non-tender; no masses or HSM  Extremities: No peripheral edema or digital cyanosis  Skin: no rash, lesions or ulcers  Psych: Alert and oriented to person, place and time  Neuro: Alert and interactive     Recent Labs     11/30/24 0239 12/01/24  0625 12/02/24  0529   WBC 7.6  7.9 4.7 7.6   HGB 13.0  13.2 12.0 12.2   HCT 40.1  39.7 36.6 38.2     156  --  134       Recent Labs     11/30/24 0239 12/01/24  0625 12/02/24  0529    141 139   K 3.9 4.0 4.4    110* 107   CO2 26 25 24   BUN 19 16 17   CREATININE 1.0 0.8 0.9   CALCIUM 9.3 8.5* 8.6       Assessment:    Principal Problem:    AMS (altered mental status)  Active Problems:    Major depressive disorder, recurrent episode, severe with anxious distress (HCC)  Resolved Problems:    * No resolved hospital problems. *      Plan:    74-year-old female presents to the ED with complaints of suicidal ideation and is admitted to telemetry unit with     New onset A-fib  Telemetry monitoring  Rate control medications  Lovenox 1 mg/kilogram twice daily-can be transition to p.o. Eliquis 5 twice daily  Check TSH  Cardiology consulted  rate is adequately controlled on my evaluation currently, she she is cleared from medicine standpoint to be transferred to inpatient psych-no need for medicine admission     Attempted suicide with plan and action taken  CO at bedside, daughter at bedside on my

## 2024-12-02 NOTE — PLAN OF CARE
Problem: Discharge Planning  Goal: Discharge to home or other facility with appropriate resources  12/1/2024 2209 by Nicole Lopez RN  Outcome: Progressing  12/1/2024 1319 by Lucero Dunbar RN  Outcome: Progressing     Problem: Safety - Adult  Goal: Free from fall injury  12/1/2024 2209 by Nicole Lopez RN  Outcome: Progressing  12/1/2024 1319 by Lucero Dunbar RN  Outcome: Progressing  Flowsheets (Taken 12/1/2024 0800)  Free From Fall Injury: Instruct family/caregiver on patient safety     Problem: Self Harm/Suicidality  Goal: Will have no self-injury during hospital stay  Description: INTERVENTIONS:  1.  Ensure constant observer at bedside with Q15M safety checks  2.  Maintain a safe environment  3.  Secure patient belongings  4.  Ensure family/visitors adhere to safety recommendations  5.  Ensure safety tray has been added to patient's diet order  6.  Every shift and PRN: Re-assess suicidal risk via Frequent Screener    12/1/2024 2209 by Nicole Lopez RN  Outcome: Progressing  12/1/2024 1319 by Lucero Dunbar RN  Outcome: Progressing

## 2024-12-02 NOTE — PROGRESS NOTES
Patient's belongings were placed in two bags and placed in locker room. She has two shirts, 2 pants, socks, a pair of shoes, a folder  and 3 pull ups. All ligatures removed from room.

## 2024-12-02 NOTE — PROGRESS NOTES
Spiritual Health History and Assessment/Progress Note  [facility]    (P) Spiritual/Emotional Needs,  ,  ,      Name: Kerri Woo MRN: [id number]    Age: 74 y.o.     Sex: female   Language: English   Muslim: Non-Baptism   AMS (altered mental status)     Date: 12/2/2024                           Spiritual Assessment began in [facility]        Referral/Consult From: (P) Multi-disciplinary team   Encounter Overview/Reason: (P) Spiritual/Emotional Needs  Service Provided For: (P) Patient    Nakia, Belief, Meaning:   Patient identifies as spiritual  Family/Friends No family/friends present      Importance and Influence:  Patient has spiritual/personal beliefs that influence decisions regarding their health  Family/Friends No family/friends present    Community:  Patient is connected with a spiritual community  Family/Friends No family/friends present    Assessment and Plan of Care:     Patient Interventions include: Affirmed coping skills/support systems  Family/Friends Interventions include: No family/friends present    Patient Plan of Care: No spiritual needs identified for follow-up  Family/Friends Plan of Care: No family/friends present    Electronically signed by Chaplain FER on 12/2/2024 at 12:00 PM

## 2024-12-02 NOTE — CARE COORDINATION
12/2/24 CM Note Pt admitted to IP after RRT on  Psych unit where pt has been after suicide attempt. EP following  for afib.  Converted back to SR while on Amiodarone gtt.  IVFs 100cc/hr. . Discharge plan to return to psych once medically stable, pt has signed voluntary admission.   Gayatri ORDOÑEZN RN-BC  [phone]      .

## 2024-12-02 NOTE — CONSULTS
Department of Psychiatry  Behavioral Health Consult    REASON FOR CONSULT: Suicidal Ideations     CONSULTING PHYSICIAN: Chely Guevara MD     History obtained from: Patient and medical record review    HISTORY OF PRESENT ILLNESS:     Patient was seen and assessed at bedside by Dr. Medrano and I patient still states that she does not feel like herself she states that she still continues to have feelings of depression, endorses anhedonia, low energy poor concentration poor appetite and poor sleep.  Patient is agreeable to sign voluntary and be admitted to [facility]. inpatient psychiatric unit for further psychiatric assessment stabilization and treatment when she is medically cleared from the medical floor.  Patient was initially admitted to psychiatric unit and discharged same day after fall on the unit.  She states she did not get any time to have her medications adjusted or to be stabilized she is agreeable with coming back to the unit.  Dr. Medrano explained procedures to patient and she is agreeable.        Past Medical History:        Diagnosis Date    Depression     Post herpetic neuralgia        Past Surgical History:        Procedure Laterality Date    BLADDER SUSPENSION      CHOLECYSTECTOMY      HERNIA REPAIR      VARICOSE VEIN SURGERY         Medications Prior to Admission:   Medications Prior to Admission: traZODone (DESYREL) 150 MG tablet, Take 1 tablet by mouth nightly (Patient not taking: Reported on 11/29/2024)  omeprazole (PRILOSEC) 20 MG delayed release capsule, Take 1 capsule by mouth daily (Patient not taking: Reported on 11/29/2024)  venlafaxine (EFFEXOR XR) 75 MG extended release capsule, Take 1 capsule by mouth daily (with breakfast)  melatonin 3 MG TABS tablet, Take 1 tablet by mouth nightly as needed (sleep)  memantine (NAMENDA) 5 MG tablet, Take 1 tablet by mouth 2 times daily  pantoprazole (PROTONIX) 40 MG tablet, Take 1 tablet by mouth daily (Patient not taking: Reported on 11/26/2024)    Allergies:   pneumothorax or pleural effusion.     Mild prominence of interstitial lung markings could represent chronic changes or interstitial edema without focal consolidation.     CT CERVICAL SPINE WO CONTRAST    Result Date: 11/30/2024  EXAMINATION: CT OF THE CERVICAL SPINE WITHOUT CONTRAST 11/30/2024 1:36 am TECHNIQUE: CT of the cervical spine was performed without the administration of intravenous contrast. Multiplanar reformatted images are provided for review. Automated exposure control, iterative reconstruction, and/or weight based adjustment of the mA/kV was utilized to reduce the radiation dose to as low as reasonably achievable. COMPARISON: None. HISTORY: ORDERING SYSTEM PROVIDED HISTORY: fall TECHNOLOGIST PROVIDED HISTORY: Reason for exam:->fall What reading provider will be dictating this exam?->CRC FINDINGS: BONES/ALIGNMENT: There is no acute fracture or traumatic malalignment. DEGENERATIVE CHANGES: Diffuse cervical spondylosis with severe disc degenerative change C5-C6 and C6-C7, likely with moderate central and foraminal stenosis at these levels. SOFT TISSUES: There is no prevertebral soft tissue swelling.     1. No acute abnormality of the cervical spine. 2. Diffuse cervical spondylosis with severe disc degenerative change C5-C6 and C6-C7, likely with moderate central and foraminal stenosis at these levels.     CT HEAD WO CONTRAST    Result Date: 11/30/2024  EXAMINATION: CT OF THE HEAD WITHOUT CONTRAST  11/30/2024 1:36 am TECHNIQUE: CT of the head was performed without the administration of intravenous contrast. Automated exposure control, iterative reconstruction, and/or weight based adjustment of the mA/kV was utilized to reduce the radiation dose to as low as reasonably achievable. COMPARISON: 10/31/2024 HISTORY: ORDERING SYSTEM PROVIDED HISTORY: fall/head injury TECHNOLOGIST PROVIDED HISTORY: Reason for exam:->fall/head injury Has a \"code stroke\" or \"stroke alert\" been called?->No What reading provider  will be dictating this exam?->CRC FINDINGS: BRAIN/VENTRICLES: There is no acute intracranial hemorrhage, mass effect or midline shift.  No abnormal extra-axial fluid collection.  The gray-white differentiation is maintained without evidence of an acute infarct.  There is no evidence of hydrocephalus. Mild atrophy and periventricular white matter degenerative change. ORBITS: The visualized portion of the orbits demonstrate no acute abnormality. SINUSES: The visualized paranasal sinuses and mastoid air cells demonstrate no acute abnormality. SOFT TISSUES/SKULL:  No acute abnormality of the visualized skull.  Mild right parietal scalp laceration/contusion..     No acute intracranial abnormality.     Echo (TTE) complete (PRN contrast/bubble/strain/3D)    Result Date: 11/29/2024    Left Ventricle: Normal left ventricular systolic function with a visually estimated EF of 60 - 65%. Left ventricle size is normal. Normal wall thickness. Normal wall motion. Grade I diastolic dysfunction with normal LAP.   Aortic Valve: Mild sclerosis of the aortic valve cusps.   Mitral Valve: Mild annular calcification. Mild stenosis noted.   Tricuspid Valve: Mild regurgitation.   Interatrial Septum: PFO present with a right to left shunt visible by color Doppler. Hypermobile interatrial septum.   Image quality is fair.     XR CHEST PORTABLE    Result Date: 11/25/2024  EXAMINATION: ONE XRAY VIEW OF THE CHEST 11/25/2024 8:34 pm COMPARISON: None. HISTORY: ORDERING SYSTEM PROVIDED HISTORY: New onset A-fib TECHNOLOGIST PROVIDED HISTORY: Reason for exam:->New onset A-fib FINDINGS: The lungs are without acute focal process.  There is no effusion or pneumothorax. The cardiomediastinal silhouette is without acute process. The osseous structures are without acute process.     No acute process.       RECOMMENDATIONS    Patient was seen and assessed at bedside by Dr. Medrano and I, she is agreeable to sign voluntarily to come to inpatient psychiatric unit

## 2024-12-02 NOTE — PLAN OF CARE
Problem: Discharge Planning  Goal: Discharge to home or other facility with appropriate resources  Outcome: Progressing     Problem: Safety - Adult  Goal: Free from fall injury  Outcome: Progressing     Problem: Self Harm/Suicidality  Goal: Will have no self-injury during hospital stay  Description: INTERVENTIONS:  1.  Ensure constant observer at bedside with Q15M safety checks  2.  Maintain a safe environment  3.  Secure patient belongings  4.  Ensure family/visitors adhere to safety recommendations  5.  Ensure safety tray has been added to patient's diet order  6.  Every shift and PRN: Re-assess suicidal risk via Frequent Screener    Outcome: Progressing     Problem: Pain  Goal: Verbalizes/displays adequate comfort level or baseline comfort level  Outcome: Progressing

## 2024-12-02 NOTE — PROGRESS NOTES
[facility] PHYSICIANS[facility]  Inpatient progress note  PATIENT: Kerri Woo  MEDICAL RECORD NUMBER: [id number]  DATE OF SERVICE:  12/2/2024  ATTENDING ELECTROPHYSIOLOGIST: Jono Hall MD  PRIMARY ELECTROPHYSIOLOGIST: Mariaa Mcdonnell MD   REFERRING PHYSICIAN:  Castro Boles Jr., MD  CHIEF COMPLAINT: Suicidal attempt, syncope    HPI: This is a 74 y.o. female with a history of bladder incontinence, constipation, prediabetes, GERD, depression, questionable dementia.  She has no cardiac history and has never seen a cardiologist/electrophysiologist.  At home she is managed on Namenda, Prilosec, trazodone and Effexor.    She was previously admitted for suicidal ideation April 2024 at that time she was found to be in normal sinus rhythm.  Patient reports that she was at home with her  is typically functionally independent and able to complete her ADLs without limitation of chest pain, shortness of breath.  Over the last 3 months she has been noted progressive dyspnea upon exertion, and inability to complete her activities of daily living due to activity intolerance.  She also notes palpitations that have been going on for several months lasting seconds to minutes without associated symptoms typically with activity.    On 11/25/2024 the patient was at home when she attempted suicide by drowning herself in the bathtub and slitting her wrists.  After she attempted this she changed her mind and attempted to bandage her wounds when she  had syncope and was seen by her  with EMS services being notified.     She was seen in emergency department initial EKG revealed atrial fibs/flutter with a second EKG revealing sinus rhythm.  She was seen inpatient by Dr. Osborne who noted new onset atrial fled her on low-dose Toprol 12.5 Mg twice daily as well as Eliquis.  On hospital day 2 at 930 the patient converted from sinus rhythm to atrial flutter with  function with a visually estimated EF of 60 - 65%. Left ventricle size is normal. Normal wall thickness. Normal wall motion. Grade I diastolic dysfunction with normal LAP.   Left Atrium Left atrium size is normal.   Interatrial Septum PFO present with a right to left shunt visible by color Doppler. Hypermobile interatrial septum.   Right Ventricle Right ventricle size is normal. Normal systolic function.   Right Atrium Right atrium size is normal.   Aortic Valve Mild sclerosis of the aortic valve cusps. No regurgitation. No significant stenosis.   Mitral Valve Mild annular calcification. Trace regurgitation. Mild stenosis noted.   Tricuspid Valve Valve structure is normal. Mild regurgitation. Normal RVSP.   Pulmonic Valve The pulmonic valve was not well visualized. No regurgitation.   Aorta Normal sized aortic root.   Pericardium No pericardial effusion.       I have independently reviewed all of the ECGs and rhythm strips per above     Assessment/Plan:     1.  New onset paroxysmal atrial fibrillation  -First seen 11/25/2024 when admitted for suicide ideation with 3-month history of palpitations.  -[id number] 3 (age, gender)  -Anticoagulation: Currently on Eliquis 5mg BID.   - Rate control Toprol 25mg QD.     2.  Anxiety/depression  -Patient managed on Effexor at home.  -Presented for suicidal ideation with constant sitter.  -Prior admission for suicidal ideation in April 2024.    3.  Dementia ?  -Namenda    4.  Insomnia  -Trazodone    5.  GERD  -Protonix    6.  Orthostatic hypotension noted after her fall in the psychiatric unit.  Patient asymptomatic    Recommendations:    Stop IV amiodarone at this time due to HR's in 50's.Currently NSR. Converted last night at 21:15 PM to NSR  Continue Midodrine for orthostatic hypotension  Continue low-dose beta-blockade and systemic anticoagulation     I have spent a total of 10 minutes with the patient and the family reviewing the above stated recommendations.  And a total

## 2024-12-03 ENCOUNTER — HOSPITAL ENCOUNTER (INPATIENT)
Age: 74
LOS: 7 days | Discharge: HOME | DRG: 885 | End: 2024-12-10
Attending: PSYCHIATRY & NEUROLOGY | Admitting: PSYCHIATRY & NEUROLOGY
Payer: MEDICARE

## 2024-12-03 VITALS
TEMPERATURE: 97.8 F | WEIGHT: 121.25 LBS | RESPIRATION RATE: 22 BRPM | SYSTOLIC BLOOD PRESSURE: 125 MMHG | BODY MASS INDEX: 21.48 KG/M2 | HEIGHT: 63 IN | OXYGEN SATURATION: 99 % | HEART RATE: 58 BPM | DIASTOLIC BLOOD PRESSURE: 55 MMHG

## 2024-12-03 PROBLEM — F33.9 MAJOR DEPRESSION, RECURRENT (HCC): Status: ACTIVE | Noted: 2024-12-03

## 2024-12-03 LAB
EKG ATRIAL RATE: 66 BPM
EKG P AXIS: 50 DEGREES
EKG P-R INTERVAL: 168 MS
EKG Q-T INTERVAL: 384 MS
EKG QRS DURATION: 66 MS
EKG QTC CALCULATION (BAZETT): 402 MS
EKG R AXIS: 9 DEGREES
EKG T AXIS: 2 DEGREES
EKG VENTRICULAR RATE: 66 BPM

## 2024-12-03 PROCEDURE — 6370000000 HC RX 637 (ALT 250 FOR IP): Performed by: FAMILY MEDICINE

## 2024-12-03 PROCEDURE — 6370000000 HC RX 637 (ALT 250 FOR IP): Performed by: INTERNAL MEDICINE

## 2024-12-03 PROCEDURE — 2580000003 HC RX 258: Performed by: STUDENT IN AN ORGANIZED HEALTH CARE EDUCATION/TRAINING PROGRAM

## 2024-12-03 PROCEDURE — 6370000000 HC RX 637 (ALT 250 FOR IP): Performed by: STUDENT IN AN ORGANIZED HEALTH CARE EDUCATION/TRAINING PROGRAM

## 2024-12-03 PROCEDURE — 2580000003 HC RX 258: Performed by: INTERNAL MEDICINE

## 2024-12-03 PROCEDURE — 6370000000 HC RX 637 (ALT 250 FOR IP)

## 2024-12-03 PROCEDURE — 1240000000 HC EMOTIONAL WELLNESS R&B

## 2024-12-03 PROCEDURE — 6370000000 HC RX 637 (ALT 250 FOR IP): Performed by: NURSE PRACTITIONER

## 2024-12-03 PROCEDURE — 99233 SBSQ HOSP IP/OBS HIGH 50: CPT | Performed by: INTERNAL MEDICINE

## 2024-12-03 RX ORDER — MAGNESIUM SULFATE IN WATER 40 MG/ML
2000 INJECTION, SOLUTION INTRAVENOUS PRN
Status: DISCONTINUED | OUTPATIENT
Start: 2024-12-03 | End: 2024-12-10 | Stop reason: HOSPADM

## 2024-12-03 RX ORDER — MIRTAZAPINE 15 MG/1
15 TABLET, FILM COATED ORAL NIGHTLY
Status: DISCONTINUED | OUTPATIENT
Start: 2024-12-03 | End: 2024-12-04

## 2024-12-03 RX ORDER — NICOTINE 21 MG/24HR
1 PATCH, TRANSDERMAL 24 HOURS TRANSDERMAL DAILY
Status: CANCELLED | OUTPATIENT
Start: 2024-12-04

## 2024-12-03 RX ORDER — SODIUM CHLORIDE 9 MG/ML
INJECTION, SOLUTION INTRAVENOUS CONTINUOUS
Status: DISCONTINUED | OUTPATIENT
Start: 2024-12-03 | End: 2024-12-10 | Stop reason: HOSPADM

## 2024-12-03 RX ORDER — POLYETHYLENE GLYCOL 3350 17 G/17G
17 POWDER, FOR SOLUTION ORAL DAILY PRN
Status: DISCONTINUED | OUTPATIENT
Start: 2024-12-03 | End: 2024-12-10 | Stop reason: HOSPADM

## 2024-12-03 RX ORDER — PANTOPRAZOLE SODIUM 40 MG/1
40 TABLET, DELAYED RELEASE ORAL DAILY
Status: CANCELLED | OUTPATIENT
Start: 2024-12-04

## 2024-12-03 RX ORDER — NICOTINE 21 MG/24HR
1 PATCH, TRANSDERMAL 24 HOURS TRANSDERMAL DAILY
Status: DISCONTINUED | OUTPATIENT
Start: 2024-12-04 | End: 2024-12-10 | Stop reason: HOSPADM

## 2024-12-03 RX ORDER — POLYETHYLENE GLYCOL 3350 17 G/17G
17 POWDER, FOR SOLUTION ORAL DAILY PRN
Status: CANCELLED | OUTPATIENT
Start: 2024-12-03

## 2024-12-03 RX ORDER — MIDODRINE HYDROCHLORIDE 2.5 MG/1
2.5 TABLET ORAL
Status: DISCONTINUED | OUTPATIENT
Start: 2024-12-03 | End: 2024-12-10 | Stop reason: HOSPADM

## 2024-12-03 RX ORDER — ONDANSETRON 2 MG/ML
4 INJECTION INTRAMUSCULAR; INTRAVENOUS EVERY 6 HOURS PRN
Status: DISCONTINUED | OUTPATIENT
Start: 2024-12-03 | End: 2024-12-10 | Stop reason: HOSPADM

## 2024-12-03 RX ORDER — POTASSIUM CHLORIDE 7.45 MG/ML
10 INJECTION INTRAVENOUS PRN
Status: DISCONTINUED | OUTPATIENT
Start: 2024-12-03 | End: 2024-12-03 | Stop reason: RX

## 2024-12-03 RX ORDER — ONDANSETRON 2 MG/ML
4 INJECTION INTRAMUSCULAR; INTRAVENOUS EVERY 6 HOURS PRN
Status: DISCONTINUED | OUTPATIENT
Start: 2024-12-03 | End: 2024-12-03 | Stop reason: SDUPTHER

## 2024-12-03 RX ORDER — HYDROXYZINE HYDROCHLORIDE 50 MG/1
50 TABLET, FILM COATED ORAL 3 TIMES DAILY PRN
Status: CANCELLED | OUTPATIENT
Start: 2024-12-03

## 2024-12-03 RX ORDER — ENOXAPARIN SODIUM 100 MG/ML
40 INJECTION SUBCUTANEOUS DAILY
Status: DISCONTINUED | OUTPATIENT
Start: 2024-12-03 | End: 2024-12-03 | Stop reason: ALTCHOICE

## 2024-12-03 RX ORDER — SODIUM CHLORIDE 0.9 % (FLUSH) 0.9 %
5-40 SYRINGE (ML) INJECTION EVERY 12 HOURS SCHEDULED
Status: DISCONTINUED | OUTPATIENT
Start: 2024-12-03 | End: 2024-12-10 | Stop reason: HOSPADM

## 2024-12-03 RX ORDER — MIDODRINE HYDROCHLORIDE 2.5 MG/1
2.5 TABLET ORAL
Status: CANCELLED | OUTPATIENT
Start: 2024-12-03

## 2024-12-03 RX ORDER — VENLAFAXINE HYDROCHLORIDE 75 MG/1
75 CAPSULE, EXTENDED RELEASE ORAL
Status: CANCELLED | OUTPATIENT
Start: 2024-12-04

## 2024-12-03 RX ORDER — MAGNESIUM HYDROXIDE/ALUMINUM HYDROXICE/SIMETHICONE 120; 1200; 1200 MG/30ML; MG/30ML; MG/30ML
30 SUSPENSION ORAL PRN
Status: DISCONTINUED | OUTPATIENT
Start: 2024-12-03 | End: 2024-12-10 | Stop reason: HOSPADM

## 2024-12-03 RX ORDER — ACETAMINOPHEN 325 MG/1
650 TABLET ORAL EVERY 6 HOURS PRN
Status: DISCONTINUED | OUTPATIENT
Start: 2024-12-03 | End: 2024-12-03 | Stop reason: SDUPTHER

## 2024-12-03 RX ORDER — MEMANTINE HYDROCHLORIDE 5 MG/1
5 TABLET ORAL 2 TIMES DAILY
Status: DISCONTINUED | OUTPATIENT
Start: 2024-12-03 | End: 2024-12-10 | Stop reason: HOSPADM

## 2024-12-03 RX ORDER — MEMANTINE HYDROCHLORIDE 5 MG/1
5 TABLET ORAL 2 TIMES DAILY
Status: DISCONTINUED | OUTPATIENT
Start: 2024-12-03 | End: 2024-12-03 | Stop reason: SDUPTHER

## 2024-12-03 RX ORDER — SODIUM CHLORIDE 0.9 % (FLUSH) 0.9 %
10 SYRINGE (ML) INJECTION PRN
Status: DISCONTINUED | OUTPATIENT
Start: 2024-12-03 | End: 2024-12-10 | Stop reason: HOSPADM

## 2024-12-03 RX ORDER — VENLAFAXINE HYDROCHLORIDE 75 MG/1
75 CAPSULE, EXTENDED RELEASE ORAL
Status: DISCONTINUED | OUTPATIENT
Start: 2024-12-04 | End: 2024-12-03 | Stop reason: SDUPTHER

## 2024-12-03 RX ORDER — ACETAMINOPHEN 650 MG/1
650 SUPPOSITORY RECTAL EVERY 6 HOURS PRN
Status: DISCONTINUED | OUTPATIENT
Start: 2024-12-03 | End: 2024-12-03 | Stop reason: SDUPTHER

## 2024-12-03 RX ORDER — PANTOPRAZOLE SODIUM 40 MG/1
40 TABLET, DELAYED RELEASE ORAL DAILY
Status: DISCONTINUED | OUTPATIENT
Start: 2024-12-04 | End: 2024-12-10 | Stop reason: HOSPADM

## 2024-12-03 RX ORDER — HYDROXYZINE HYDROCHLORIDE 50 MG/1
50 TABLET, FILM COATED ORAL 3 TIMES DAILY PRN
Status: DISCONTINUED | OUTPATIENT
Start: 2024-12-03 | End: 2024-12-10 | Stop reason: HOSPADM

## 2024-12-03 RX ORDER — ACETAMINOPHEN 325 MG/1
650 TABLET ORAL EVERY 6 HOURS PRN
Status: CANCELLED | OUTPATIENT
Start: 2024-12-03

## 2024-12-03 RX ORDER — METOPROLOL SUCCINATE 25 MG/1
25 TABLET, EXTENDED RELEASE ORAL DAILY
Status: CANCELLED | OUTPATIENT
Start: 2024-12-04

## 2024-12-03 RX ORDER — ACETAMINOPHEN 650 MG/1
650 SUPPOSITORY RECTAL EVERY 6 HOURS PRN
Status: DISCONTINUED | OUTPATIENT
Start: 2024-12-03 | End: 2024-12-10 | Stop reason: HOSPADM

## 2024-12-03 RX ORDER — ONDANSETRON 4 MG/1
4 TABLET, ORALLY DISINTEGRATING ORAL EVERY 8 HOURS PRN
Status: DISCONTINUED | OUTPATIENT
Start: 2024-12-03 | End: 2024-12-10 | Stop reason: HOSPADM

## 2024-12-03 RX ORDER — SODIUM CHLORIDE 9 MG/ML
INJECTION, SOLUTION INTRAVENOUS PRN
Status: DISCONTINUED | OUTPATIENT
Start: 2024-12-03 | End: 2024-12-10 | Stop reason: HOSPADM

## 2024-12-03 RX ORDER — MEMANTINE HYDROCHLORIDE 10 MG/1
5 TABLET ORAL 2 TIMES DAILY
Status: CANCELLED | OUTPATIENT
Start: 2024-12-03

## 2024-12-03 RX ORDER — POLYETHYLENE GLYCOL 3350 17 G/17G
17 POWDER, FOR SOLUTION ORAL DAILY PRN
Status: DISCONTINUED | OUTPATIENT
Start: 2024-12-03 | End: 2024-12-03 | Stop reason: SDUPTHER

## 2024-12-03 RX ORDER — VENLAFAXINE HYDROCHLORIDE 75 MG/1
75 CAPSULE, EXTENDED RELEASE ORAL
Status: DISCONTINUED | OUTPATIENT
Start: 2024-12-04 | End: 2024-12-10 | Stop reason: HOSPADM

## 2024-12-03 RX ORDER — METOPROLOL SUCCINATE 25 MG/1
25 TABLET, EXTENDED RELEASE ORAL DAILY
Status: DISCONTINUED | OUTPATIENT
Start: 2024-12-04 | End: 2024-12-10 | Stop reason: HOSPADM

## 2024-12-03 RX ORDER — ACETAMINOPHEN 325 MG/1
650 TABLET ORAL EVERY 6 HOURS PRN
Status: DISCONTINUED | OUTPATIENT
Start: 2024-12-03 | End: 2024-12-10 | Stop reason: HOSPADM

## 2024-12-03 RX ORDER — MIRTAZAPINE 15 MG/1
15 TABLET, FILM COATED ORAL NIGHTLY
Status: CANCELLED | OUTPATIENT
Start: 2024-12-03

## 2024-12-03 RX ORDER — MAGNESIUM HYDROXIDE/ALUMINUM HYDROXICE/SIMETHICONE 120; 1200; 1200 MG/30ML; MG/30ML; MG/30ML
30 SUSPENSION ORAL PRN
Status: CANCELLED | OUTPATIENT
Start: 2024-12-03

## 2024-12-03 RX ORDER — ONDANSETRON 4 MG/1
4 TABLET, ORALLY DISINTEGRATING ORAL EVERY 8 HOURS PRN
Status: CANCELLED | OUTPATIENT
Start: 2024-12-03

## 2024-12-03 RX ORDER — ONDANSETRON 2 MG/ML
4 INJECTION INTRAMUSCULAR; INTRAVENOUS EVERY 6 HOURS PRN
Status: CANCELLED | OUTPATIENT
Start: 2024-12-03

## 2024-12-03 RX ORDER — ONDANSETRON 4 MG/1
4 TABLET, ORALLY DISINTEGRATING ORAL EVERY 8 HOURS PRN
Status: DISCONTINUED | OUTPATIENT
Start: 2024-12-03 | End: 2024-12-03 | Stop reason: SDUPTHER

## 2024-12-03 RX ORDER — ACETAMINOPHEN 650 MG/1
650 SUPPOSITORY RECTAL EVERY 6 HOURS PRN
Status: CANCELLED | OUTPATIENT
Start: 2024-12-03

## 2024-12-03 RX ORDER — POTASSIUM CHLORIDE 1500 MG/1
40 TABLET, EXTENDED RELEASE ORAL PRN
Status: DISCONTINUED | OUTPATIENT
Start: 2024-12-03 | End: 2024-12-10 | Stop reason: HOSPADM

## 2024-12-03 RX ADMIN — MIDODRINE HYDROCHLORIDE 2.5 MG: 2.5 TABLET ORAL at 17:45

## 2024-12-03 RX ADMIN — METOPROLOL SUCCINATE 25 MG: 25 TABLET, EXTENDED RELEASE ORAL at 08:50

## 2024-12-03 RX ADMIN — MIRTAZAPINE 15 MG: 15 TABLET, FILM COATED ORAL at 21:17

## 2024-12-03 RX ADMIN — APIXABAN 5 MG: 5 TABLET, FILM COATED ORAL at 08:50

## 2024-12-03 RX ADMIN — PANTOPRAZOLE SODIUM 40 MG: 40 TABLET, DELAYED RELEASE ORAL at 08:50

## 2024-12-03 RX ADMIN — MIDODRINE HYDROCHLORIDE 2.5 MG: 2.5 TABLET ORAL at 12:00

## 2024-12-03 RX ADMIN — SODIUM CHLORIDE: 9 INJECTION, SOLUTION INTRAVENOUS at 02:28

## 2024-12-03 RX ADMIN — SODIUM CHLORIDE, PRESERVATIVE FREE 10 ML: 5 INJECTION INTRAVENOUS at 08:50

## 2024-12-03 RX ADMIN — VENLAFAXINE HYDROCHLORIDE 75 MG: 75 CAPSULE, EXTENDED RELEASE ORAL at 08:59

## 2024-12-03 RX ADMIN — MEMANTINE 5 MG: 10 TABLET ORAL at 08:50

## 2024-12-03 RX ADMIN — APIXABAN 5 MG: 5 TABLET, FILM COATED ORAL at 21:17

## 2024-12-03 RX ADMIN — MIDODRINE HYDROCHLORIDE 2.5 MG: 2.5 TABLET ORAL at 08:50

## 2024-12-03 RX ADMIN — MEMANTINE 5 MG: 5 TABLET ORAL at 21:17

## 2024-12-03 ASSESSMENT — SLEEP AND FATIGUE QUESTIONNAIRES
AVERAGE NUMBER OF SLEEP HOURS: 8
DO YOU USE A SLEEP AID: YES
DO YOU HAVE DIFFICULTY SLEEPING: YES

## 2024-12-03 ASSESSMENT — PAIN SCALES - GENERAL: PAINLEVEL_OUTOF10: 0

## 2024-12-03 NOTE — PROGRESS NOTES
[facility]   Progress note      Subjective:    The patient is awake and alert.    No acute complaints    Objective:    /60   Pulse 62   Temp 98.2 °F (36.8 °C) (Temporal)   Resp 22   Ht 1.6 m (5' 2.99\")   Wt 55 kg (121 lb 4.1 oz)   SpO2 100%   BMI 21.49 kg/m²     In: 4141 [P.O.:865; I.V.:3276]  Out: -   In: 4141   Out: -     General appearance: NAD, conversant  HEENT: AT/NC, MMM  Neck: FROM, supple  Lungs: Clear to auscultation  CV: Irregularly irregular jumping from 90-1 30 on evaluation, no MRGs  Vasc: Radial pulses 2+  Abdomen: Soft, non-tender; no masses or HSM  Extremities: No peripheral edema or digital cyanosis  Skin: no rash, lesions or ulcers  Psych: Alert and oriented to person, place and time  Neuro: Alert and interactive     Recent Labs     12/01/24  0625 12/02/24  0529   WBC 4.7 7.6   HGB 12.0 12.2   HCT 36.6 38.2   PLT  --  134       Recent Labs     12/01/24  0625 12/02/24  0529    139   K 4.0 4.4   * 107   CO2 25 24   BUN 16 17   CREATININE 0.8 0.9   CALCIUM 8.5* 8.6       Assessment:    Principal Problem:    AMS (altered mental status)  Active Problems:    Major depressive disorder, recurrent episode, severe with anxious distress (HCC)    Paroxysmal atrial fibrillation (HCC)  Resolved Problems:    * No resolved hospital problems. *      Plan:    74-year-old female presents to the ED with complaints of suicidal ideation and is admitted to telemetry unit with     New onset A-fib  Telemetry monitoring  Rate control medications  Lovenox 1 mg/kilogram twice daily-can be transition to p.o. Eliquis 5 twice daily  Check TSH  Cardiology consulted  rate is adequately controlled on my evaluation currently, she she is cleared from medicine standpoint to be transferred to inpatient psych-no need for medicine admission     Attempted suicide with plan and action taken  CO at bedside, daughter at bedside on my evaluation  Consult psychiatry  Pink [id number] hold  She

## 2024-12-03 NOTE — PLAN OF CARE
Problem: Discharge Planning  Goal: Discharge to home or other facility with appropriate resources  12/2/2024 2122 by Mary Jane Diego RN  Outcome: Progressing     Problem: Safety - Adult  Goal: Free from fall injury  12/2/2024 2122 by Mary Jane Diego RN  Outcome: Progressing     Problem: Self Harm/Suicidality  Goal: Will have no self-injury during hospital stay  Description: INTERVENTIONS:  1.  Ensure constant observer at bedside with Q15M safety checks  2.  Maintain a safe environment  3.  Secure patient belongings  4.  Ensure family/visitors adhere to safety recommendations  5.  Ensure safety tray has been added to patient's diet order  6.  Every shift and PRN: Re-assess suicidal risk via Frequent Screener    12/2/2024 2122 by Mary Jane Diego RN  Outcome: Progressing     Problem: Pain  Goal: Verbalizes/displays adequate comfort level or baseline comfort level  12/2/2024 2122 by Mary Jane Diego RN  Outcome: Progressing     Problem: ABCDS Injury Assessment  Goal: Absence of physical injury  Outcome: Progressing

## 2024-12-03 NOTE — PLAN OF CARE
Problem: Decision Making  Goal: Pt/Family able to effectively weigh alternatives and participate in decision making related to treatment and care  Description: INTERVENTIONS:  1. Determine when there are differences between patient's view, family's view, and healthcare provider's view of condition  2. Facilitate patient and family articulation of goals for care  3. Help patient and family identify pros/cons of alternative solutions  4. Provide information as requested by patient/family  5. Respect patient/family right to receive or not to receive information  6. Serve as a liaison between patient and family and health care team  7. Initiate Consults from Ethics, Palliative Care or initiate Family Care Conference as is appropriate  Outcome: Progressing     Problem: Safety - Adult  Goal: Free from fall injury  Outcome: Progressing     Problem: Depression/Self Harm  Goal: Effect of psychiatric condition will be minimized and patient will be protected from self harm  Description: INTERVENTIONS:  1. Assess impact of patient's symptoms on level of functioning, self care needs and offer support as indicated  2. Assess patient/family knowledge of depression, impact on illness and need for teaching  3. Provide emotional support, presence and reassurance  4. Assess for possible suicidal thoughts or ideation. If patient expresses suicidal thoughts or statements do not leave alone, initiate Suicide Precautions, move to a room close to the nursing station and obtain sitter  5. Initiate consults as appropriate with Mental Health Professional, Spiritual Care, Psychosocial CNS, and consider a recommendation to the [facility] for a Psychiatric Consultation  Outcome: Progressing

## 2024-12-03 NOTE — PROGRESS NOTES
CHARISSA notified of medical clearance for psych admission. Left message for Lizzette Gong    Electronically signed by Hoa Geller RN on 12/3/2024 at 12:26 PM

## 2024-12-03 NOTE — PLAN OF CARE
Problem: Discharge Planning  Goal: Discharge to home or other facility with appropriate resources  Outcome: Progressing     Problem: Safety - Adult  Goal: Free from fall injury  Outcome: Progressing     Problem: Self Harm/Suicidality  Goal: Will have no self-injury during hospital stay  Description: INTERVENTIONS:  1.  Ensure constant observer at bedside with Q15M safety checks  2.  Maintain a safe environment  3.  Secure patient belongings  4.  Ensure family/visitors adhere to safety recommendations  5.  Ensure safety tray has been added to patient's diet order  6.  Every shift and PRN: Re-assess suicidal risk via Frequent Screener    Outcome: Progressing     Problem: Pain  Goal: Verbalizes/displays adequate comfort level or baseline comfort level  Outcome: Progressing     Problem: ABCDS Injury Assessment  Goal: Absence of physical injury  Outcome: Progressing

## 2024-12-03 NOTE — CARE COORDINATION
CTRS met with patient to complete leisure assessment.       12/03/24 7193   Activities of Daily Living   Patient Requires assistance with daily self-care activities? No   Leisure Activity 1   3 Favorite Leisure Activities \"reading, some tv, walking\"   Frequency weekly   Last time this week   Social   Patient reports spending the majority of their free time with one other person   Patient verbalizes a preference for spending free time with one other person   Patient’s perception of support system more healthy  (patient reports her  Shun and children are supportive of her care)   Patient’s perception of barriers to socializing with others include(s) no perceived barriers   Social Details patient reports she currently lives with her .  She has six children 4 sons and two girls.  Patient reports that she receives social security and also sees Dr. Rivera at [facility].   Beliefs & Coping   Has difficulty dealing with feelings   Yes   Internalizes feelings/Keeps feelings in Yes  (\"but I have been opening up more)   Externalizes feelings through aggressiveness or poor temper control  No   Feels uncomfortable around others  No   Has difficulty talking to others  No   Depends on others for direction or decisions No   Difficulty dealing with anger of others  No   Difficulty dealing with own anger  No   Difficulty managing stress Yes   Frequently has difficulty with relationships  No   Has recently perceived/experienced loss, disappointment, humiliation or failure  NO   General perception about self ambivalent   Attitude about abilities more successful than not   Locus of Control  most of the time   Belief about recovery Recovery is possible   Patient Identified Strengths  \"I am good with children\"   Patient Identified Limitations  \"The anxiety\"   Perception of most stressful event prior to hospitalization \"I found out I had a heart problem and I was so anxious\"   Perception of changes needed \"Different medication

## 2024-12-03 NOTE — CARE COORDINATION
12/3/24 Update CM Note.  Pt admitted 11/30/24.  Medically stable for DC.  Planning IP admission to psych, awaiting bed assignment.   Gayatri ORDOÑEZN RN-BC  [phone]

## 2024-12-03 NOTE — DISCHARGE INSTR - COC
Continuity of Care Form    Patient Name: Kerri Woo   :  1950  MRN:  [id number]    Admit date:  2024  Discharge date:  ***    Code Status Order: Full Code   Advance Directives:   Advance Care Flowsheet Documentation             Admitting Physician:  Chely Guevara MD  PCP: Castro Boles Jr., MD    Discharging Nurse: ***  Discharging Hospital Unit/Room#: 7408/7408-A  Discharging Unit Phone Number: ***    Emergency Contact:   Extended Emergency Contact Information  Primary Emergency Contact: AmnaShun  Address: [address] [place] [address] [facility]  Home Phone: [phone]  Relation: Spouse    Past Surgical History:  Past Surgical History:   Procedure Laterality Date    BLADDER SUSPENSION      CHOLECYSTECTOMY      HERNIA REPAIR      VARICOSE VEIN SURGERY         Immunization History:   Immunization History   Administered Date(s) Administered    COVID-19, MODERNA BLUE border, Primary or Immunocompromised, (age 12y+), IM, 100 mcg/0.5mL 2021    TD 5LF, TENIVAC, (age 7y+), IM, 0.5mL 2024       Active Problems:  Patient Active Problem List   Diagnosis Code    Major depressive disorder, recurrent episode, severe with anxious distress (HCC) F33.2    New onset a-fib (HCC) I48.91    Suicidal ideation R45.851    Mood disorder (HCC) F39    AMS (altered mental status) R41.82    Paroxysmal atrial fibrillation (HCC) I48.0       Isolation/Infection:   Isolation            No Isolation          Patient Infection Status       None to display            Nurse Assessment:  Last Vital Signs: BP (!) 125/55   Pulse 58   Temp 97.8 °F (36.6 °C) (Temporal)   Resp 22   Ht 1.6 m (5' 2.99\")   Wt 55 kg (121 lb 4.1 oz)   SpO2 99%   BMI 21.49 kg/m²     Last documented pain score (0-10 scale): Pain Level: 0  Last Weight:   Wt Readings from Last 1 Encounters:   24 55 kg (121 lb 4.1 oz)     Mental Status:  {IP PT MENTAL STATUS:}    IV Access:  { RUBY IV  Certification: I certify the above information and transfer of Kerri M Amna  is necessary for the continuing treatment of the diagnosis listed and that she requires {Admit to Appropriate Level of Care:77645} for {GREATER/LESS:511873700} 30 days.     Update Admission H&P: {CHP DME Changes in HandP:153913160}    PHYSICIAN SIGNATURE:  {Esignature:[id number]}

## 2024-12-03 NOTE — BH NOTE
4 Eyes Skin Assessment     NAME:  Kerri Woo  YOB: 1950  MEDICAL RECORD NUMBER:  [id number]    The patient is being assessed for  Admission    I agree that at least one RN has performed a thorough Head to Toe Skin Assessment on the patient. ALL assessment sites listed below have been assessed.      Areas assessed by both nurses:    Head, Face, Ears, Shoulders, Back, Chest, Arms, Elbows, Hands, Sacrum. Buttock, Coccyx, Ischium, and Legs. Feet and Heels        Does the Patient have a Wound? Yes wound(s) were present on assessment. LDA wound assessment was Initiated and completed by RN Bilateral cuts on F/A self inflicted. Are healed over with scabs       David Prevention initiated by RN: No  Wound Care Orders initiated by RN: No    Pressure Injury (Stage 3,4, Unstageable, DTI, NWPT, and Complex wounds) if present, place Wound referral order by RN under : No    New Ostomies, if present place, Ostomy referral order under : No     Nurse 1 eSignature: Electronically signed by Renata Miranda RN on 12/3/24 at 4:58 PM EST    **SHARE this note so that the co-signing nurse can place an eSignature**    Nurse 2 eSignature: Electronically signed by Ankit Haley RN on 12/10/24 at 12:49 PM EST

## 2024-12-03 NOTE — PROGRESS NOTES
[facility]ICI[facility]  Inpatient progress note  PATIENT: Kerri Woo  MEDICAL RECORD NUMBER: [id number]  DATE OF SERVICE:  12/3/2024  ATTENDING ELECTROPHYSIOLOGIST: Jono Hall MD  PRIMARY ELECTROPHYSIOLOGIST: Mariaa Mcdonnell MD   REFERRING PHYSICIAN:  Castro Boles Jr., MD  CHIEF COMPLAINT: Suicidal attempt, syncope    HPI: This is a 74 y.o. female with a history of bladder incontinence, constipation, prediabetes, GERD, depression, questionable dementia.  She has no cardiac history and has never seen a cardiologist/electrophysiologist.  At home she is managed on Namenda, Prilosec, trazodone and Effexor.    She was previously admitted for suicidal ideation April 2024 at that time she was found to be in normal sinus rhythm.  Patient reports that she was at home with her  is typically functionally independent and able to complete her ADLs without limitation of chest pain, shortness of breath.  Over the last 3 months she has been noted progressive dyspnea upon exertion, and inability to complete her activities of daily living due to activity intolerance.  She also notes palpitations that have been going on for several months lasting seconds to minutes without associated symptoms typically with activity.    On 11/25/2024 the patient was at home when she attempted suicide by drowning herself in the bathtub and slitting her wrists.  After she attempted this she changed her mind and attempted to bandage her wounds when she  had syncope and was seen by her  with EMS services being notified.     She was seen in emergency department initial EKG revealed atrial fibs/flutter with a second EKG revealing sinus rhythm.  She was seen inpatient by Dr. Osborne who noted new onset atrial fled her on low-dose Toprol 12.5 Mg twice daily as well as Eliquis.  On hospital day 2 at 930 the patient converted from sinus rhythm to atrial flutter with  an average heart rate of 115 bpm and spontaneously converted back to sinus rhythm with a rate of 75 bpm 1327.    Cardiac electrophysiology service is consulted for new onset atrial flutter.    11/29/2024:The patient had her echo completed today that showed normal LVEF without valvular heart disease. She has been taken off telemetry and complains of worsening anxiety.     12/1/24: Patient was transferred to the psychiatric unit yesterday and early morning today she was standing at the nurses station when she fell suddenly sustaining a head injury.  The patient does not recall the events of falling and has had no symptoms of lightheadedness or dizziness.  In fact the nurses noticed that albeit being markedly orthostatic she is completely asymptomatic from the drop in blood pressure.  In addition she developed atrial fibrillation after being transferred to a monitored bed and was therefore referred back to electrophysiology.    12/2/24: Patient seen and examined at bedside. Was on Amiodarone drip, however her HR dropped into the 50's. This has since been discontinued. Presently in NSR. She converted to NSR at 21:15 last night. On a low dose beta blocker and midodrine. Does report some lightheadedness at times. Otherwise no new cardiac complaints.    12/3/24: Patient seen at bedside. Remains in NSR. Feels well Remains on low dose beta blocker along with midodrine. Does endorse in some lightheadedness at times, but infrequent. Otherwise no new cardiac complaints.    Patient Active Problem List    Diagnosis Date Noted    Paroxysmal atrial fibrillation (HCC) 12/02/2024    AMS (altered mental status) 11/30/2024    Mood disorder (HCC) 11/29/2024    Suicidal ideation 11/26/2024    New onset a-fib (HCC) 11/25/2024    Major depressive disorder, recurrent episode, severe with anxious distress (HCC) 04/02/2024       Past Medical History:   Diagnosis Date    Depression     Post herpetic neuralgia        No family history on  comparison     Echo Findings    Left Ventricle Normal left ventricular systolic function with a visually estimated EF of 60 - 65%. Left ventricle size is normal. Normal wall thickness. Normal wall motion. Grade I diastolic dysfunction with normal LAP.   Left Atrium Left atrium size is normal.   Interatrial Septum PFO present with a right to left shunt visible by color Doppler. Hypermobile interatrial septum.   Right Ventricle Right ventricle size is normal. Normal systolic function.   Right Atrium Right atrium size is normal.   Aortic Valve Mild sclerosis of the aortic valve cusps. No regurgitation. No significant stenosis.   Mitral Valve Mild annular calcification. Trace regurgitation. Mild stenosis noted.   Tricuspid Valve Valve structure is normal. Mild regurgitation. Normal RVSP.   Pulmonic Valve The pulmonic valve was not well visualized. No regurgitation.   Aorta Normal sized aortic root.   Pericardium No pericardial effusion.       I have independently reviewed all of the ECGs and rhythm strips per above     Assessment/Plan:     1.  New onset paroxysmal atrial fibrillation  -First seen 11/25/2024 when admitted for suicide ideation with 3-month history of palpitations.  -[id number] 3 (age, gender)  -Anticoagulation: Currently on Eliquis 5mg BID.   - Rate control Toprol 25mg QD.     2.  Anxiety/depression  -Patient managed on Effexor at home.  -Presented for suicidal ideation with constant sitter.  -Prior admission for suicidal ideation in April 2024.    3.  Dementia ?  -Namenda    4.  Insomnia  -Trazodone    5.  GERD  -Protonix    6.  Orthostatic hypotension noted after her fall in the psychiatric unit.  Patient asymptomatic    Recommendations:    Remains in NSR  Continue Midodrine for orthostatic hypotension  Continue low-dose beta-blockade and systemic anticoagulation   Patient is stable for discharge from an  POV. Will sign off at this time.    I have spent a total of 10 minutes with the patient and the

## 2024-12-03 NOTE — BH NOTE
[facility]  Admission Note     Admission Type: Voluntary       Reason for admission:MDD         Addictive Behavior: none       Medical Problems:   Past Medical History:   Diagnosis Date    Depression     Post herpetic neuralgia        Status EXAM:  Mental Status and Behavioral Exam  Normal: No  Level of Assistance: Independent/Self  Facial Expression: Sad  Affect: Blunt  Level of Consciousness: Alert  Frequency of Checks: 4 times per hour, close  Mood:Normal: No  Mood: Depressed, Anxious  Motor Activity:Normal: Yes  Motor Activity: Decreased  Eye Contact: Good  Observed Behavior: Cooperative  Sexual Misconduct History: Current - no  Preception: [place] to person, [place] to place, [place] to time, [place] to situation  Attention:Normal: Yes  Thought Processes: Circumstantial  Thought Content:Normal: No  Thought Content: Preoccupations  Depression Symptoms: Change in energy level  Anxiety Symptoms: No problems reported or observed.  Cassidy Symptoms: No problems reported or observed.  Hallucinations: None  Delusions: No  Memory:Normal: Yes  Memory: Poor recent  Insight and Judgment: No  Insight and Judgment: Poor judgment, Poor insight    Tobacco Screening:  Practical Counseling, on admission, dotty X, if applicable and completed (first 3 are required if patient doesn't refuse)           ( ) Recognizing danger situations (included triggers and roadblocks)                    ( ) Coping skills (new ways to manage stress,relaxation techniques, changing routine, distraction)                                                           ( ) Basic information about quitting (benefits of quitting, techniques in how to quit, available resources  ( ) [facility]                                                                                                                   ( ) Patient refused counseling  ( ) Patient has not smoked in the last 30 days    Metabolic  Screening:    Lab Results   Component Value Date    LABA1C 5.6 04/03/2024       Lab Results   Component Value Date    CHOL 140 04/03/2024     Lab Results   Component Value Date    TRIG 79 04/03/2024     Lab Results   Component Value Date    HDL 48 04/03/2024     No components found for: \"LDLCAL\"  No components found for: \"LABVLDL\"      Body mass index is 21.26 kg/m².    BP Readings from Last 2 Encounters:   12/03/24 120/65   12/03/24 (!) 125/55       Recliner Assessment:  Patient is able to demonstrate the ability to move from a reclining position to an upright position within the recliner.    Pt admitted with followings belongings:  Dental Appliances: None  Vision - Corrective Lenses: Eyeglasses  Hearing Aid: Right hearing aid (only have one with patient)  Jewelry: None  Body Piercings Removed: No  Clothing: Footwear, Pajamas, Bathrobe, Pants, Shirt, At bedside  Other Valuables: At home  The following personal items were collected during admission. Items secured in locker/safe. Items will be returned to patient at discharge.     Renata Miranda RN

## 2024-12-03 NOTE — CARE COORDINATION
spoke to CRISTINA Townsend on [facility] and confirmed patient has been accepted by Dr Medrano with a dx of Major Depression.     Patient will go to room 7304A and admitting is updated.     Medical floor CRISTINA Ho update on patient being accepted. Patient is to be transferred with belongings and original voluntary hold paperwork.

## 2024-12-03 NOTE — PROGRESS NOTES
Patient stated that her dentures are lost and a police report has been filed upon previous admit.

## 2024-12-03 NOTE — CARE COORDINATION
aware of Pt needing admission    Medical clearance noted in chart    Psych eval complete    Voluntary in place    N2N is to be called to [phone] to review for admission.

## 2024-12-03 NOTE — CARE COORDINATION
called [facility] and confirmed there is no update yet on if patient will be accepted or not.     Awaiting a call back to ext [phone].

## 2024-12-04 LAB
ALBUMIN SERPL-MCNC: 3.4 G/DL (ref 3.5–5.2)
ALP SERPL-CCNC: 85 U/L (ref 35–104)
ALT SERPL-CCNC: 20 U/L (ref 0–32)
ANION GAP SERPL CALCULATED.3IONS-SCNC: 10 MMOL/L (ref 7–16)
AST SERPL-CCNC: 17 U/L (ref 0–31)
BASOPHILS # BLD: 0.03 K/UL (ref 0–0.2)
BASOPHILS NFR BLD: 1 % (ref 0–2)
BILIRUB SERPL-MCNC: 0.3 MG/DL (ref 0–1.2)
BUN SERPL-MCNC: 15 MG/DL (ref 6–23)
CALCIUM SERPL-MCNC: 9.2 MG/DL (ref 8.6–10.2)
CHLORIDE SERPL-SCNC: 106 MMOL/L (ref 98–107)
CO2 SERPL-SCNC: 27 MMOL/L (ref 22–29)
CREAT SERPL-MCNC: 0.9 MG/DL (ref 0.5–1)
EOSINOPHIL # BLD: 0.03 K/UL (ref 0.05–0.5)
EOSINOPHILS RELATIVE PERCENT: 1 % (ref 0–6)
ERYTHROCYTE [DISTWIDTH] IN BLOOD BY AUTOMATED COUNT: 13.9 % (ref 11.5–15)
GFR, ESTIMATED: 65 ML/MIN/1.73M2
GLUCOSE SERPL-MCNC: 100 MG/DL (ref 74–99)
HCT VFR BLD AUTO: 34.5 % (ref 34–48)
HGB BLD-MCNC: 11.3 G/DL (ref 11.5–15.5)
IMM GRANULOCYTES # BLD AUTO: <0.03 K/UL (ref 0–0.58)
IMM GRANULOCYTES NFR BLD: 0 % (ref 0–5)
LYMPHOCYTES NFR BLD: 1.31 K/UL (ref 1.5–4)
LYMPHOCYTES RELATIVE PERCENT: 20 % (ref 20–42)
MCH RBC QN AUTO: 30.5 PG (ref 26–35)
MCHC RBC AUTO-ENTMCNC: 32.8 G/DL (ref 32–34.5)
MCV RBC AUTO: 93 FL (ref 80–99.9)
MONOCYTES NFR BLD: 0.69 K/UL (ref 0.1–0.95)
MONOCYTES NFR BLD: 11 % (ref 2–12)
NEUTROPHILS NFR BLD: 68 % (ref 43–80)
NEUTS SEG NFR BLD: 4.35 K/UL (ref 1.8–7.3)
PLATELET # BLD AUTO: 159 K/UL (ref 130–450)
PMV BLD AUTO: 11.7 FL (ref 7–12)
POTASSIUM SERPL-SCNC: 4.4 MMOL/L (ref 3.5–5)
PROT SERPL-MCNC: 5.9 G/DL (ref 6.4–8.3)
RBC # BLD AUTO: 3.71 M/UL (ref 3.5–5.5)
SODIUM SERPL-SCNC: 143 MMOL/L (ref 132–146)
WBC OTHER # BLD: 6.4 K/UL (ref 4.5–11.5)

## 2024-12-04 PROCEDURE — 6370000000 HC RX 637 (ALT 250 FOR IP)

## 2024-12-04 PROCEDURE — 80053 COMPREHEN METABOLIC PANEL: CPT

## 2024-12-04 PROCEDURE — 36415 COLL VENOUS BLD VENIPUNCTURE: CPT

## 2024-12-04 PROCEDURE — 1240000000 HC EMOTIONAL WELLNESS R&B

## 2024-12-04 PROCEDURE — 90792 PSYCH DIAG EVAL W/MED SRVCS: CPT

## 2024-12-04 PROCEDURE — 97535 SELF CARE MNGMENT TRAINING: CPT

## 2024-12-04 PROCEDURE — 97530 THERAPEUTIC ACTIVITIES: CPT

## 2024-12-04 PROCEDURE — 97161 PT EVAL LOW COMPLEX 20 MIN: CPT

## 2024-12-04 PROCEDURE — 85025 COMPLETE CBC W/AUTO DIFF WBC: CPT

## 2024-12-04 PROCEDURE — 97165 OT EVAL LOW COMPLEX 30 MIN: CPT

## 2024-12-04 PROCEDURE — 6370000000 HC RX 637 (ALT 250 FOR IP): Performed by: FAMILY MEDICINE

## 2024-12-04 RX ORDER — TRAZODONE HYDROCHLORIDE 50 MG/1
50 TABLET, FILM COATED ORAL NIGHTLY
Status: DISCONTINUED | OUTPATIENT
Start: 2024-12-04 | End: 2024-12-07

## 2024-12-04 RX ADMIN — HYDROXYZINE HYDROCHLORIDE 50 MG: 50 TABLET, FILM COATED ORAL at 00:11

## 2024-12-04 RX ADMIN — MEMANTINE 5 MG: 5 TABLET ORAL at 09:40

## 2024-12-04 RX ADMIN — APIXABAN 5 MG: 5 TABLET, FILM COATED ORAL at 09:39

## 2024-12-04 RX ADMIN — HYDROXYZINE HYDROCHLORIDE 50 MG: 50 TABLET, FILM COATED ORAL at 21:50

## 2024-12-04 RX ADMIN — PANTOPRAZOLE SODIUM 40 MG: 40 TABLET, DELAYED RELEASE ORAL at 09:39

## 2024-12-04 RX ADMIN — TRAZODONE HYDROCHLORIDE 50 MG: 50 TABLET ORAL at 21:51

## 2024-12-04 RX ADMIN — MIDODRINE HYDROCHLORIDE 2.5 MG: 2.5 TABLET ORAL at 10:01

## 2024-12-04 RX ADMIN — VENLAFAXINE HYDROCHLORIDE 75 MG: 75 CAPSULE, EXTENDED RELEASE ORAL at 09:40

## 2024-12-04 RX ADMIN — APIXABAN 5 MG: 5 TABLET, FILM COATED ORAL at 21:51

## 2024-12-04 RX ADMIN — METOPROLOL SUCCINATE 25 MG: 25 TABLET, EXTENDED RELEASE ORAL at 09:40

## 2024-12-04 RX ADMIN — MIDODRINE HYDROCHLORIDE 2.5 MG: 2.5 TABLET ORAL at 17:18

## 2024-12-04 RX ADMIN — MEMANTINE 5 MG: 5 TABLET ORAL at 21:51

## 2024-12-04 ASSESSMENT — PATIENT HEALTH QUESTIONNAIRE - PHQ9
2. FEELING DOWN, DEPRESSED OR HOPELESS: MORE THAN HALF THE DAYS
9. THOUGHTS THAT YOU WOULD BE BETTER OFF DEAD, OR OF HURTING YOURSELF: SEVERAL DAYS
SUM OF ALL RESPONSES TO PHQ QUESTIONS 1-9: 15
3. TROUBLE FALLING OR STAYING ASLEEP: NEARLY EVERY DAY
6. FEELING BAD ABOUT YOURSELF - OR THAT YOU ARE A FAILURE OR HAVE LET YOURSELF OR YOUR FAMILY DOWN: NOT AT ALL
8. MOVING OR SPEAKING SO SLOWLY THAT OTHER PEOPLE COULD HAVE NOTICED. OR THE OPPOSITE, BEING SO FIGETY OR RESTLESS THAT YOU HAVE BEEN MOVING AROUND A LOT MORE THAN USUAL: MORE THAN HALF THE DAYS
SUM OF ALL RESPONSES TO PHQ QUESTIONS 1-9: 15
4. FEELING TIRED OR HAVING LITTLE ENERGY: NEARLY EVERY DAY
7. TROUBLE CONCENTRATING ON THINGS, SUCH AS READING THE NEWSPAPER OR WATCHING TELEVISION: SEVERAL DAYS
SUM OF ALL RESPONSES TO PHQ QUESTIONS 1-9: 14
1. LITTLE INTEREST OR PLEASURE IN DOING THINGS: MORE THAN HALF THE DAYS
SUM OF ALL RESPONSES TO PHQ9 QUESTIONS 1 & 2: 4
SUM OF ALL RESPONSES TO PHQ QUESTIONS 1-9: 15
5. POOR APPETITE OR OVEREATING: SEVERAL DAYS
10. IF YOU CHECKED OFF ANY PROBLEMS, HOW DIFFICULT HAVE THESE PROBLEMS MADE IT FOR YOU TO DO YOUR WORK, TAKE CARE OF THINGS AT HOME, OR GET ALONG WITH OTHER PEOPLE: VERY DIFFICULT

## 2024-12-04 ASSESSMENT — SLEEP AND FATIGUE QUESTIONNAIRES
DO YOU HAVE DIFFICULTY SLEEPING: YES
DO YOU USE A SLEEP AID: YES
SLEEP PATTERN: DIFFICULTY FALLING ASLEEP;DISTURBED/INTERRUPTED SLEEP;RESTLESSNESS
AVERAGE NUMBER OF SLEEP HOURS: 6

## 2024-12-04 ASSESSMENT — PAIN SCALES - GENERAL
PAINLEVEL_OUTOF10: 0
PAINLEVEL_OUTOF10: 0

## 2024-12-04 NOTE — GROUP NOTE
Group Therapy Note    Date: 12/4/2024    Group Start Time: 1045  Group End Time: 1130  Group Topic: Psychoeducation    [facility]    Marianela Simmons, CTRS    Date: 12/4/2024    Type of Group: Psychoeducation    Wellness Binder Information  Module Name:  creating a healthy routine     Patient's Goal:  pt will be able to id ways to organize his/her tasks so it becomes a patter and easier to get things accomplished.     Notes:  Pleasant and willing to share, assist in reading steps on worksheet and accepting of handout.     Status After Intervention:  Improved    Participation Level: Active Listener and Interactive    Participation Quality: Appropriate, Attentive, and Sharing      Speech:  normal      Thought Process/Content: Logical      Affective Functioning: Congruent      Mood: euthymic      Level of consciousness:  Alert, Oriented x4, and Attentive      Response to Learning: Able to verbalize/acknowledge new learning, Able to retain information, and Progressing to goal      Endings: None Reported    Modes of Intervention: Education, Support, Exploration, and Problem-solving      Discipline Responsible: Psychoeducational Specialist      Signature:  Marianela Simmons, CTRS

## 2024-12-04 NOTE — PLAN OF CARE
spiritual support   Provide information about the patient’s health status with consideration of family and cultural values   Communicate willingness to discuss loss and facilitate grief process with patient/family as appropriate   Emphasize sustaining relationships within family system and community, or rakesh/spiritual traditions     Problem: Decision Making  Goal: Pt/Family able to effectively weigh alternatives and participate in decision making related to treatment and care  Description: INTERVENTIONS:  1. Determine when there are differences between patient's view, family's view, and healthcare provider's view of condition  2. Facilitate patient and family articulation of goals for care  3. Help patient and family identify pros/cons of alternative solutions  4. Provide information as requested by patient/family  5. Respect patient/family right to receive or not to receive information  6. Serve as a liaison between patient and family and health care team  7. Initiate Consults from Ethics, Palliative Care or initiate Family Care Conference as is appropriate  12/3/2024 2157 by Sarahy Gagnon, RN  Outcome: Progressing  Flowsheets (Taken 12/3/2024 2142)  Patient/family able to effectively weigh alternatives and participate in decision making related to treatment and care: Determine when there are differences between patient's view, family's view, and healthcare provider's view of condition  12/3/2024 1744 by Renata Miranda, RN  Outcome: Progressing     Problem: Decision Making  Goal: Pt/Family able to effectively weigh alternatives and participate in decision making related to treatment and care  Description: INTERVENTIONS:  1. Determine when there are differences between patient's view, family's view, and healthcare provider's view of condition  2. Facilitate patient and family articulation of goals for care  3. Help patient and family identify pros/cons of alternative solutions  4. Provide information as requested  with 1:1 interaction with staff  4. Encourage  recovery focused treatment   Outcome: Progressing  Flowsheets (Taken 12/3/2024 2142)  Will have no detox symptoms and will verbalize plan for changing drug-related behavior:   Administer medication as ordered   Monitor physical status   Provide emotional support with 1:1 interaction with staff     Problem: Spiritual Care  Goal: Pt/Family able to move forward in process of forgiving self, others, and/or higher power  Description: INTERVENTIONS:  1. Assist patient/family to overcome blocks to healing by use of spiritual practices (prayer, meditation, guided imagery, reiki, breath work, etc).  2. De-myth guilt and help patient/family identify possible irrational spiritual/cultural beliefs and values.  3. Explore possibilities of making amends & reconciliation with self, others, and/or a greater power.  4. Guide patient/family in identifying painful feelings of guilt.  5. Help patient/famiy explore and identify spiritual beliefs, cultural understandings or values that may help or hinder letting go of issue.  6. Help patient/family explore feelings of anger, bitterness, resentment.  7. Help patient/family identify and examine the situation in which these feelings are experienced.  8. Help patient/family identify destructive displacement of feelings onto other individuals.  9. Invite use of sacraments/rituals/ceremonies as appropriate (e.g. - confession, anointing, smudging).  10. Refer patient/family to formal counseling and/or to rakesh community for further support work.  Outcome: Progressing  Flowsheets (Taken 12/3/2024 2142)  Patient/family able to move forward in process of forgiving self, others, and/or higher power: Assist patient to overcome blocks to healing by use of spiritual practices (prayer, meditation, guided imagery, reiki, breath work, etc)     Problem: Spiritual Care  Goal: Pt/Family able to move forward in process of forgiving self, others, and/or higher

## 2024-12-04 NOTE — PROGRESS NOTES
Occupational Therapy    OCCUPATIONAL THERAPY INITIAL EVALUATION    [facility]  [address], [place]        Date:2024                                                  Patient Name: Kerri Woo    MRN: [id number]    : 1950    Room: [address] [place] [address]          Evaluating OT: Julisa Zapata, MEGHAND, OTR/L; [id number]      Occupational therapy physician order:   Start   Ordering Provider    24 1630  OT eval and treat  Start:  24 163,   End:  24 1630,   ONE TIME,   Standing Count:  1 Occurrences,   R         Susan Simmons, MADDISON - CNP          Pt presents to ED with suicidal ideation/attempt. Pt was admitted to [facility] and experienced a fall at the nurses station      Diagnosis:   Patient Active Problem List   Diagnosis    Major depressive disorder, recurrent episode, severe with anxious distress (HCC)    New onset a-fib (HCC)    Suicidal ideation    Mood disorder (HCC)    AMS (altered mental status)    Paroxysmal atrial fibrillation (HCC)    Major depression, recurrent (HCC)          Pertinent Medical History:   Past Medical History:   Diagnosis Date    Depression     Post herpetic neuralgia           Surgery/Procedures: none this admission        Recommended Adaptive Equipment: TBD        Precautions:  Fall Risk, monitor BP, suicidal precautions      Assessment of current deficits    [x] Functional mobility  [x]ADLs  [x] Strength               [x]Cognition    [x] Functional transfers   [x] IADLs         [x] Safety Awareness   [x]Endurance    [x] Fine Coordination              [x] Balance      [] Vision/perception   []Sensation     []Gross Motor Coordination  [] ROM  [] Delirium                   [] Motor Control     OT PLAN OF CARE   OT POC based on physician orders, patient diagnosis and results of clinical assessment    Frequency/Duration 1-3 days/wk for 2 weeks PRN   Specific OT Treatment Interventions to include:   *   1 to 3 [x]  Low [x]  None []  None []   Moderate Complexity   3 to 5 []  Mod []  Maybe []  Min to Mod [x]   High Complexity   5 or more []  High []  Yes [x]  Max []     The above evaluation is classified as low complexity based off the noted performance deficits, personal factors, co-morbidities, assistance required, and other factors as noted in the clinical evaluation and functional testing.     Evaluation time includes thorough review of current medical information, gathering information on past medical & social history & PLOF, completion of standardized testing, informal observation of tasks, consultation with other medical professions/disciplines, assessment of data & development of POC/goals.     Time In: 1200  Time Out: 1223  Total Treatment Time: 8 min    Min Units   OT Eval Low 90591  X     OT Eval Medium 57677      OT Eval High 94070      OT Re-Eval 39395       Therapeutic Ex 48464       Therapeutic Activities 73299       ADL/Self Care 59965  8 1    Orthotic Management 86847       Manual 51978     Neuro Re-Ed 19055       Non-Billable Time                  Julisa Zapata, MAYRA, OTR/L; [id number]

## 2024-12-04 NOTE — CARE COORDINATION
Biopsychosocial Assessment Note    Social work met with patient to complete the biopsychosocial assessment and C-SSRS.     Chief Complaint: \"I was trying to commit suicide, unsuccessfully of course.\"    Mental Status Exam: Pt is alert and oriented x4, depressed, anxious and sad. She has a decrease in motor activity, fair eye contact and is logical with clear speech.  Pt denies current SI, HI, AVH.  She has poor insight and poor judgement.       Clinical Summary: Pt is a 74 year old female that attempted suicide by slitting her wrists and drowning herself in the bathtub.  She was brought in by EMS on 11/25 and once medically cleared admitted to behavioral health unit on 12/3.  Pt stated that she had planned her suicide attempt for a week and waited until her  left to  food.  Pt has one prior inpatient psychiatric hospitalization for suicidal ideations on 4/1/2024 for suicidal ideations.     Pt denies any current stressors.  She stated that she has had multiple psychiatric medication changes and her depression and anxiety has increased.  She also reports a decrease in appetite and sleep issues.  She reports struggling with depression her entire life and feeling the best when she was Cymbalta prior to the brand change and shortly after completing TMS treatment.  Pt reported Zoloft makes her suicidal and she has a list of meds at home that do not help her depression.     Pt is  to her  of 53 years and resides in [place].  They have six children and nine grandchildren.  She is on disability.  She reports graduating highschool and completing three years of college, studying business.  Pt stated that she was a  during her working years.  She denies having any Pentecostalism or spiritual beliefs.      Pt reports there was mental health issues with depression on her maternal side.  She stated her mother had depression and a cousin committed suicide.  She reports that she has attempted

## 2024-12-04 NOTE — BH NOTE
Patient is alert and oriented x 4. Patient has a flat, worried, blunt affect and appears anxious and depressed. Patient is friendly, and cooperative. Patient appears withdrawn and preoccupied. Patient denies any suicidal ideations, homicidal ideations, auditory and visual hallucinations. Patient denies any depression at this time. Patient reports, \"anxiety is 5/10.\" Patient denies any pain at this time. Patient does not appear in any distress at this time. Fall and standard precautions reviewed and implemented. Will continue to monitor patient every 15 minute rounds to ensure patient safety.

## 2024-12-04 NOTE — GROUP NOTE
Shared goal for the day as to try the be positive.                                                                       Group Therapy Note    Date: 12/4/2024    Group Start Time: 0830  Group End Time: 0845  Group Topic: Community Meeting    [facility]  1    Marianela Simmons, ANA    Type of Group: Community Meeting      Patient's Goal:  Patient will be able to id staffing assignments, expectations of patients, and general information re: floor rules. Will be prompted to share goal for the day.     Notes:  Patient appeared to be an active listener, taking in information presented and was prompted to share goal for the day.    Status After Intervention:  Improved    Participation Level: Active Listener and Interactive    Participation Quality: Appropriate, Attentive, and Sharing      Speech:  normal      Thought Process/Content: Logical      Affective Functioning: Congruent      Mood: euthymic      Level of consciousness:  Alert, Oriented x4, and Attentive      Response to Learning: Able to verbalize/acknowledge new learning      Endings: None Reported    Modes of Intervention: Support, Socialization, and Clarifying      Discipline Responsible: Psychoeducational Specialist      Signature:  ANA Boilvar

## 2024-12-04 NOTE — PLAN OF CARE
Patient denies suicidal ideation, homicidal ideation, and AV hallucinations. She is sad, anxious, and depressed. She does brighten on conversation. She rates anxiety 5/10 and depression 3/10. She is cooperative and friendly. She has poor eye contact. She is compliant with medications, and is attending groups.    Problem: Safety - Adult  Goal: Free from fall injury  12/4/2024 0856 by Marya Quijano RN  Outcome: Progressing     Problem: Pain  Goal: Verbalizes/displays adequate comfort level or baseline comfort level  12/4/2024 0856 by Marya Quijano RN  Outcome: Progressing     Problem: Anxiety  Goal: Will report anxiety at manageable levels  Description: INTERVENTIONS:  1. Administer medication as ordered  2. Teach and rehearse alternative coping skills  3. Provide emotional support with 1:1 interaction with staff  12/4/2024 0856 by Marya Quijano RN  Outcome: Progressing     Problem: Behavior  Goal: Pt/Family maintain appropriate behavior and adhere to behavioral management agreement, if implemented  Description: INTERVENTIONS:  1. Assess patient/family's coping skills and  non-compliant behavior (including use of illegal substances)  2. Notify security of behavior or suspected illegal substances which indicate the need for search of the family and/or belongings  3. Encourage verbalization of thoughts and concerns in a socially appropriate manner  4. Utilize positive, consistent limit setting strategies supporting safety of patient, staff and others  5. Encourage participation in the decision making process about the behavioral management agreement  6. If a visitor's behavior poses a threat to safety call refer to organization policy.  7. Initiate consult with , Psychosocial CNS, Spiritual Care as appropriate  12/4/2024 0856 by Marya Quijano RN  Outcome: Progressing     Problem: Depression/Self Harm  Goal: Effect of psychiatric condition will be minimized and patient will be protected from

## 2024-12-04 NOTE — CARE COORDINATION
FRANKLIN received a call from pt's  Shun [phone] (JIMENA signed). Shun reports the pt tried to commit suicide. He reports the pt has been struggling with her medications and she seemed to be doing well after going through TMS but then she started to have more problems. Shun thinks some of this is related to her heart issues.    Shun stated the pt will return home and he will be waiting for her with open arms. He confirmed the pt does not have access to any guns or weapons. Shun hopes pt's depression doesn't creep back again and he hopes the pt will be able to get on the right track.

## 2024-12-04 NOTE — H&P
Department of Psychiatry  History and Physical - Adult     CHIEF COMPLAINT:  No chief complaint on file.      Patient was seen after discussing with the treatment team and reviewing the chart    CIRCUMSTANCES OF ADMISSION:     Patient name: Kerri Woo  Patient's past mental health and addiction history: History of major depressive disorder with anxious distress with previous inpatient psychiatric hospitalization last being here at [facility] in April 2024  Patient's presentation to the ED and why the patient needs admission: Suicidal ideation, patient was cutting herself with a kitchen knife at home  Legal status:  []  Voluntary  [x]  Involuntary  []  Probate  Triggering/precipitating events: Unknown  Duration of triggering/precipitating events: Prior to arrival    HISTORY OF PRESENT ILLNESS:      The patient is a 74-year-old  female with history of major depressive disorder with anxious distress past inpatient psychiatric hospitalization here at [facility] in April 2024 at that time patient was discharged in stable condition on Effexor Remeron and Namenda presented to the ED via EMS endorsing suicidal ideation after reported suicide attempt patient was cut himself with a kitchen knife at home.  Urine drug screen positive for amphetamines, benzodiazepines, fentanyl alcohol level negative, QTc 402.  Patient was then found to have new A-fib patient was admitted to general medical floor initially for treatment once patient was medically cleared she was involuntarily admitted to [facility] for further psychiatric assessment stabilization and treatment.    Upon evaluation today patient was seen in her room she is sitting on her bed she appears slightly disheveled she is currently calm pleasant and cooperative.  She states that she is beginning to feel little better she states that prior to coming to the ED she was feeling very depressed, hopeless, endorsed  abnormalities noted  Attitude toward examiner:  cooperative  Speech:  spontaneous, normal rate, normal volume, and well articulated   Mood: sad  Affect:  mood congruent and flat  Thought processes:  linear   Thought content: Devoid of auditory visual donations, delusions or any other perceptual abnormalities.  Currently denies suicidal ideation intent or plan.  Denies homicidal ideation intent or plan  Cognition:  oriented to person, place, and time   Concentration intact  Memory intact  Insight Limited  Judgement Limited  Fund of Knowledge adequate      DIAGNOSIS:    Major depressive disorder recurrent severe with anxious distress    LABS: REVIEWED TODAY:  Recent Labs     12/02/24  0529 12/04/24  0704   WBC 7.6 6.4   HGB 12.2 11.3*    159     Recent Labs     12/02/24  0529      K 4.4      CO2 24   BUN 17   CREATININE 0.9   GLUCOSE 103*     No results for input(s): \"BILITOT\", \"ALKPHOS\", \"AST\", \"ALT\" in the last 72 hours.  Lab Results   Component Value Date/Time    BARBSCNU NEGATIVE 11/25/2024 10:30 PM    LABBENZ POSITIVE 11/25/2024 10:30 PM    LABMETH NEGATIVE 11/25/2024 10:30 PM    ETOH <10 11/25/2024 08:29 PM     Lab Results   Component Value Date/Time    TSH 1.80 11/30/2024 02:39 AM     No results found for: \"LITHIUM\"  No results found for: \"VALPROATE\", \"CBMZ\"  No results found for: \"LITHIUM\", \"VALPROATE\"      Radiology XR CHEST PORTABLE    Result Date: 11/30/2024  EXAMINATION: ONE XRAY VIEW OF THE CHEST 11/30/2024 8:10 am COMPARISON: Chest x-ray dated 11/25/2024 HISTORY: ORDERING SYSTEM PROVIDED HISTORY: Syncope, ground level fall TECHNOLOGIST PROVIDED HISTORY: Reason for exam:->Syncope, ground level fall FINDINGS: Cardiac size borderline enlarged without overt edema.  Mild prominence of interstitial lung markings could represent chronic changes or interstitial edema without focal consolidation.  No pneumothorax or pleural effusion.     Mild prominence of interstitial lung markings could

## 2024-12-04 NOTE — BH NOTE
[facility]  Initial Interdisciplinary Treatment Plan Note      Original treatment plan Date & Time: 12/4/24 0900    Admission Type:       Reason for admission:        Estimated Length of Stay:  5-7days  Estimated Discharge Date: To be determined by physician.    PATIENT STRENGTHS:  Patient Strengths:   Patient Strengths and Limitations:   Addictive Behavior:    Medical Problems:  Past Medical History:   Diagnosis Date    Depression     Post herpetic neuralgia      Status EXAM:Mental Status and Behavioral Exam  Normal: No  Level of Assistance: Independent/Self  Facial Expression: Brightened  Affect: Blunt  Level of Consciousness: Alert  Frequency of Checks: 4 times per hour, close  Mood:Normal: No  Mood: Anxious, Depressed, Sad  Motor Activity:Normal: Yes  Motor Activity: Decreased  Eye Contact: Fair  Observed Behavior: Cooperative, Friendly, Guarded  Sexual Misconduct History: Current - no  Preception: [place] to person, Gerald to time, Gerald to situation, Gerald to place  Attention:Normal: No  Attention: Distractible  Thought Processes: Circumstantial  Thought Content:Normal: No  Thought Content: Preoccupations  Depression Symptoms: Isolative, Loss of interest  Anxiety Symptoms: Generalized  Cassidy Symptoms: No problems reported or observed.  Hallucinations: None  Delusions: No  Memory:Normal: No  Memory: Poor recent  Insight and Judgment: No  Insight and Judgment: Poor judgment, Poor insight    EDUCATION:   Learner Progress Toward Treatment Goals: Will review group plans and strategies for care.    Method: Group therapy, Medication compliance, Individualized assessments and Care planning.    Outcome: Needs Reinforcement    PATIENT GOALS: To be discussed with patient within 72 hours    PLAN/TREATMENT RECOMMENDATIONS:     Continue group therapy , Medications compliance, Goal setting, Individualized assessments and Care planning, continue to monitor patient on unit.      SHORT-TERM GOALS:   Time frame  for Short-Term Goals: 5-7 days    LONG-TERM GOALS:  Time frame for Long-Term Goals: 6 months  Members Present in Team Meeting: See Signature Sheet    Marya Quijano RN

## 2024-12-04 NOTE — PROGRESS NOTES
Physical Therapy  Physical Therapy Initial Assessment     Name: Kerri Woo  : 1950  MRN: [id number]      Date of Service: 2024    Evaluating PT:  Ebony Ashford PT, DPT [id number]    Room #:  7304/7304-A  Diagnosis:  Major depression, recurrent (HCC) [F33.9]  PMHx/PSHx:   has a past medical history of Depression and Post herpetic neuralgia.  Procedure/Surgery:  None this admission  Precautions:  Fall risk, monitor BP, suicide precautions  Equipment Needs:  TBD    SUBJECTIVE:    Pt lives with her  in a 2 story home with 1 step to enter and no rail(s). Pt's bed and bathroom are on the second floor with a full flight and bilateral rails to access. Pt ambulated with no device PTA.    OBJECTIVE:   Initial Evaluation  Date: 24 Treatment Date:  Short Term/ Long Term   Goals   AM-PAC 6 Clicks      Was pt agreeable to Eval/treatment? Yes     Does pt have pain? Denied     Bed Mobility  Rolling: NT  Supine to sit: NT  Sit to supine: NT  Scooting: NT  Rolling: Independent  Supine to sit: Independent  Sit to supine: Independent  Scooting: Independent   Transfers Sit to stand: SBA  Stand to sit: SBA  Stand pivot: SBA with no device  Sit to stand: Independent  Stand to sit: Independent  Stand pivot: Independent   Ambulation    300 feet with no device and SBA  >600 feet with no device Independent   Stair negotiation: ascended and descended  NT  >12 steps with bilateral rail and Mod I   ROM BUE:  Refer to OT  BLE:  WFL     Strength BUE:  Refer to OT  BLE:  Not formally assessed     Balance Sitting EOB:  NT  Dynamic Standing:  SBA with no device  Sitting EOB:  Independent  Dynamic Standing:  Independent     Pt is A & O x 4  Sensation:  Pt denies numbness and tingling to extremities  Edema:  Unremarkable     Therapeutic Exercises:  Multi-directional turns, obstacle negotiation, multi-planar head turns in garcia    Patient education  Pt educated on role of PT in acute setting, benefits of upright  mobility, use of call light in room, safety.    Patient response to education:   Pt verbalized understanding Pt demonstrated skill Pt requires further education in this area   Yes Partially  Reinforce     ASSESSMENT:    Conditions Requiring Skilled Therapeutic Intervention:    [x]Decreased strength     [x]Decreased ROM  [x]Decreased functional mobility  [x]Decreased balance   [x]Decreased endurance   [x]Decreased posture  []Decreased sensation  [x]Decreased coordination   []Decreased vision  [x]Decreased safety awareness   [x]Increased pain       Comments:  Pt was seated in chair upon therapist arrival, agreeable to session. Pt was very pleasant and cooperative. Patient participated in mobility as documented above. Deficits observed this date throughout functional mobility included the following: decreased gait speed, narrow AUDREY, bilateral path deviation, and postural stability. Greatest moments of postural instability occurred while completing multi-planar head turns in hallway. No overt LOB was observed this date. After session, patient was assisted back into chair and was left upright with all needs met, questions answered.    Treatment:  Patient practiced and was instructed in the following treatment:    Transfers - Cues provided for safety, hand/feet placement, physical assist provided as needed.  Ambulation - Cues provided for negotiation of obstacles, physical assist provided as needed.  Skilled positioning - To prevent skin breakdown and contractures.  Skilled assessment of vitals.  Education - Provided for safety, role of PT in acute setting, benefits of upright mobility.    Pt's/ family goals   1. Return to PLOF    Prognosis is Good for reaching above PT goals.    Patient and or family understand(s) diagnosis, prognosis, and plan of care.  Yes     PHYSICAL THERAPY PLAN OF CARE:    PT POC is established based on physician order and patient diagnosis     Referring provider/PT Order:    Start   Ordering

## 2024-12-05 LAB
ALBUMIN SERPL-MCNC: 3.4 G/DL (ref 3.5–5.2)
ALP SERPL-CCNC: 80 U/L (ref 35–104)
ALT SERPL-CCNC: 18 U/L (ref 0–32)
ANION GAP SERPL CALCULATED.3IONS-SCNC: 8 MMOL/L (ref 7–16)
AST SERPL-CCNC: 17 U/L (ref 0–31)
BASOPHILS # BLD: 0.03 K/UL (ref 0–0.2)
BASOPHILS NFR BLD: 1 % (ref 0–2)
BILIRUB SERPL-MCNC: 0.3 MG/DL (ref 0–1.2)
BUN SERPL-MCNC: 15 MG/DL (ref 6–23)
CALCIUM SERPL-MCNC: 9.1 MG/DL (ref 8.6–10.2)
CHLORIDE SERPL-SCNC: 104 MMOL/L (ref 98–107)
CO2 SERPL-SCNC: 28 MMOL/L (ref 22–29)
CREAT SERPL-MCNC: 0.9 MG/DL (ref 0.5–1)
EOSINOPHIL # BLD: 0.03 K/UL (ref 0.05–0.5)
EOSINOPHILS RELATIVE PERCENT: 1 % (ref 0–6)
ERYTHROCYTE [DISTWIDTH] IN BLOOD BY AUTOMATED COUNT: 13.6 % (ref 11.5–15)
GFR, ESTIMATED: 65 ML/MIN/1.73M2
GLUCOSE SERPL-MCNC: 95 MG/DL (ref 74–99)
HCT VFR BLD AUTO: 33.7 % (ref 34–48)
HGB BLD-MCNC: 11 G/DL (ref 11.5–15.5)
IMM GRANULOCYTES # BLD AUTO: <0.03 K/UL (ref 0–0.58)
IMM GRANULOCYTES NFR BLD: 0 % (ref 0–5)
LYMPHOCYTES NFR BLD: 1.61 K/UL (ref 1.5–4)
LYMPHOCYTES RELATIVE PERCENT: 28 % (ref 20–42)
MCH RBC QN AUTO: 30.3 PG (ref 26–35)
MCHC RBC AUTO-ENTMCNC: 32.6 G/DL (ref 32–34.5)
MCV RBC AUTO: 92.8 FL (ref 80–99.9)
MONOCYTES NFR BLD: 0.65 K/UL (ref 0.1–0.95)
MONOCYTES NFR BLD: 11 % (ref 2–12)
NEUTROPHILS NFR BLD: 60 % (ref 43–80)
NEUTS SEG NFR BLD: 3.45 K/UL (ref 1.8–7.3)
PLATELET # BLD AUTO: 179 K/UL (ref 130–450)
PMV BLD AUTO: 11.6 FL (ref 7–12)
POTASSIUM SERPL-SCNC: 4.4 MMOL/L (ref 3.5–5)
PROT SERPL-MCNC: 5.6 G/DL (ref 6.4–8.3)
RBC # BLD AUTO: 3.63 M/UL (ref 3.5–5.5)
SODIUM SERPL-SCNC: 140 MMOL/L (ref 132–146)
WBC OTHER # BLD: 5.8 K/UL (ref 4.5–11.5)

## 2024-12-05 PROCEDURE — 80053 COMPREHEN METABOLIC PANEL: CPT

## 2024-12-05 PROCEDURE — 36415 COLL VENOUS BLD VENIPUNCTURE: CPT

## 2024-12-05 PROCEDURE — 6370000000 HC RX 637 (ALT 250 FOR IP)

## 2024-12-05 PROCEDURE — 6370000000 HC RX 637 (ALT 250 FOR IP): Performed by: FAMILY MEDICINE

## 2024-12-05 PROCEDURE — 1240000000 HC EMOTIONAL WELLNESS R&B

## 2024-12-05 PROCEDURE — 85025 COMPLETE CBC W/AUTO DIFF WBC: CPT

## 2024-12-05 PROCEDURE — 99232 SBSQ HOSP IP/OBS MODERATE 35: CPT

## 2024-12-05 RX ADMIN — APIXABAN 5 MG: 5 TABLET, FILM COATED ORAL at 21:13

## 2024-12-05 RX ADMIN — TRAZODONE HYDROCHLORIDE 50 MG: 50 TABLET ORAL at 21:13

## 2024-12-05 RX ADMIN — MIDODRINE HYDROCHLORIDE 2.5 MG: 2.5 TABLET ORAL at 09:10

## 2024-12-05 RX ADMIN — MIDODRINE HYDROCHLORIDE 2.5 MG: 2.5 TABLET ORAL at 17:14

## 2024-12-05 RX ADMIN — METOPROLOL SUCCINATE 25 MG: 25 TABLET, EXTENDED RELEASE ORAL at 09:11

## 2024-12-05 RX ADMIN — MEMANTINE 5 MG: 5 TABLET ORAL at 21:13

## 2024-12-05 RX ADMIN — PANTOPRAZOLE SODIUM 40 MG: 40 TABLET, DELAYED RELEASE ORAL at 09:11

## 2024-12-05 RX ADMIN — VENLAFAXINE HYDROCHLORIDE 75 MG: 75 CAPSULE, EXTENDED RELEASE ORAL at 09:10

## 2024-12-05 RX ADMIN — APIXABAN 5 MG: 5 TABLET, FILM COATED ORAL at 09:11

## 2024-12-05 RX ADMIN — MIDODRINE HYDROCHLORIDE 2.5 MG: 2.5 TABLET ORAL at 12:02

## 2024-12-05 RX ADMIN — MEMANTINE 5 MG: 5 TABLET ORAL at 09:11

## 2024-12-05 ASSESSMENT — PAIN SCALES - GENERAL
PAINLEVEL_OUTOF10: 0
PAINLEVEL_OUTOF10: 0

## 2024-12-05 NOTE — CARE COORDINATION
Sw met with pt to check in. She stated that she is doing okay. She endorsed some anxiety and depression but stated that it is getting better. She denied SI/HI/AVH.

## 2024-12-05 NOTE — PROGRESS NOTES
Spiritual Support Group Note    Number of Participants in Group:      9                  Time:  2 pm    Goal: Relief from isolation and loneliness             Nakia Sharing             Self-understanding and gain insight              Acceptance and belonging            Recognize they are not alone                Socialization             Empowerment       Encouragement    Topic:  [] Spiritual Wellness and Self Care                  [] Hope                     [] Connecting with Divine/Others        [] Thankfulness and Gratitude               [x]  Meaningfulness and Purpose               [] Forgiveness               [] Peace               [] Connect to Community      [x] Other: Goal setting, discussing what things weigh them down and need to be left behind as they climb their mountain. Discuss how to over come the obstacles using spiritual coping skills like prayer, meditation, changing life styles, identifying where help comes from.       Participation Level:   [x] Active Listener   [] Minimal   [] Monopolizing   [] Interactive   [] No Participation   []  Other:     Attention:   [x] Alert   [] Distractible   [] Drowsy   [] Poor   [] Other:    Manner:   [x] Cooperative   [] Suspicious   [] Withdrawn   [] Guarded   [] Irritable   [] Inhospitable   [] Other:     Others Comments from Group:

## 2024-12-05 NOTE — PLAN OF CARE
Problem: Safety - Adult  Goal: Free from fall injury  12/4/2024 2053 by Marya Anderson RN  Outcome: Progressing     Problem: Pain  Goal: Verbalizes/displays adequate comfort level or baseline comfort level  12/4/2024 2053 by Marya Anderson RN  Outcome: Progressing     Problem: ABCDS Injury Assessment  Goal: Absence of physical injury  Outcome: Progressing     Problem: Anxiety  Goal: Will report anxiety at manageable levels  Description: INTERVENTIONS:  1. Administer medication as ordered  2. Teach and rehearse alternative coping skills  3. Provide emotional support with 1:1 interaction with staff  12/4/2024 2053 by Marya Anderson RN  Outcome: Progressing     Problem: Coping  Goal: Pt/Family able to verbalize concerns and demonstrate effective coping strategies  Description: INTERVENTIONS:  1. Assist patient/family to identify coping skills, available support systems and cultural and spiritual values  2. Provide emotional support, including active listening and acknowledgement of concerns of patient and caregivers  3. Reduce environmental stimuli, as able  4. Instruct patient/family in relaxation techniques, as appropriate  5. Assess for spiritual pain/suffering and initiate Spiritual Care, Psychosocial Clinical Specialist consults as needed  Outcome: Progressing     Problem: Decision Making  Goal: Pt/Family able to effectively weigh alternatives and participate in decision making related to treatment and care  Description: INTERVENTIONS:  1. Determine when there are differences between patient's view, family's view, and healthcare provider's view of condition  2. Facilitate patient and family articulation of goals for care  3. Help patient and family identify pros/cons of alternative solutions  4. Provide information as requested by patient/family  5. Respect patient/family right to receive or not to receive information  6. Serve as a liaison between patient and family and health care team  7. Initiate  Harm  Goal: Effect of psychiatric condition will be minimized and patient will be protected from self harm  Description: INTERVENTIONS:  1. Assess impact of patient's symptoms on level of functioning, self care needs and offer support as indicated  2. Assess patient/family knowledge of depression, impact on illness and need for teaching  3. Provide emotional support, presence and reassurance  4. Assess for possible suicidal thoughts or ideation. If patient expresses suicidal thoughts or statements do not leave alone, initiate Suicide Precautions, move to a room close to the nursing station and obtain sitter  5. Initiate consults as appropriate with Mental Health Professional, Spiritual Care, Psychosocial CNS, and consider a recommendation to the [facility]IP for a Psychiatric Consultation  12/4/2024 2053 by Marya Anderson, RN  Outcome: Progressing

## 2024-12-05 NOTE — BH NOTE
Patient denies suicidal ideation, homicidal ideations and AVH.  Reports anxiety a 3/10 and depression a 5/10.Presents flat, sad, calm and cooperative during assessment.  Patient is in her room conversating with her room mate and is seen on the unit pushing her room mate around in a wheelchair to help her..  Medications taken without issue.  No complaints or concerns verbalized at this time.  No unit problems reported.  Will continue to observe and support.

## 2024-12-05 NOTE — PROGRESS NOTES
BEHAVIORAL HEALTH FOLLOW-UP NOTE     12/5/2024     Patient was seen and examined in person, Chart reviewed   Patient's case discussed with staff/team    Chief Complaint: Suicidal ideation, suicide attempt    Interim History:   Patient was seen sitting out on the unit socializing with another peer looking out the window.  She states that it she is surprised there was so much no outside.  She does brighten more with conversation she states she is feeling better.  She states that she slept a lot better with the trazodone back on.  She denies suicidal ideation, denies homicidal ideation she denies auditory and visual hallucinations.  She has been taking her medications, she is in no behavioral disturbances on the unit, she is social with select peers, she states she is going to group.  Sleep and appetite reported to be fair    Appetite: [x] Normal/Unchanged  [] Increased  [] Decreased      Sleep:       [x] Normal/Unchanged  [] Fair       [] Poor              Energy:    [x] Normal/Unchanged  [] Increased  [] Decreased        SI [] Present  [x] Absent    HI  []Present  [x] Absent     Aggression:  [] yes  [x] no    Patient is [x] able  [] unable to CONTRACT FOR SAFETY     PAST MEDICAL/PSYCHIATRIC HISTORY:   Past Medical History:   Diagnosis Date    Depression     Post herpetic neuralgia        FAMILY/SOCIAL HISTORY:  No family history on file.  Social History     Socioeconomic History    Marital status:      Spouse name: Not on file    Number of children: Not on file    Years of education: Not on file    Highest education level: Not on file   Occupational History    Not on file   Tobacco Use    Smoking status: Never    Smokeless tobacco: Never   Vaping Use    Vaping status: Never Used   Substance and Sexual Activity    Alcohol use: Not Currently     Comment: occ    Drug use: Never    Sexual activity: Not on file   Other Topics Concern    Not on file   Social History Narrative    Not on file     Social Determinants

## 2024-12-05 NOTE — GROUP NOTE
Shared goal for the day as to read my book.                                                                       Group Therapy Note    Date: 12/5/2024    Group Start Time: 1015  Group End Time: 1025  Group Topic: Community Meeting    [facility]  1    Marianela Simmons, SAADIAS    Type of Group: Community Meeting      Patient's Goal:  Patient will be able to id staffing assignments, expectations of patients, and general information re: floor rules. Will be prompted to share goal for the day.     Notes:  Patient appeared to be an active listener, taking in information presented and was prompted to share goal for the day.    Status After Intervention:  Improved    Participation Level: Active Listener and Interactive    Participation Quality: Appropriate, Attentive, and Sharing      Speech:  normal      Thought Process/Content: Logical      Affective Functioning: Congruent      Mood: euthymic      Level of consciousness:  Alert, Oriented x4, and Attentive      Response to Learning: Able to verbalize/acknowledge new learning and Able to retain information      Endings: None Reported    Modes of Intervention: Support, Socialization, and Clarifying      Discipline Responsible: Psychoeducational Specialist      Signature:  Marianela Simmons CTRS

## 2024-12-05 NOTE — GROUP NOTE
Group Therapy Note    Date: 12/5/2024    Group Start Time: 1035  Group End Time: 1115  Group Topic: Psychoeducation    [facility] 1    Marianela Simmons, CTRS    Date: 12/5/2024  Name:  What is in my control.     Patient's Goal:  Pts able to learn and identify what emotions and actions are able to be managed when you can focus on self control.       Notes:  [employer] students conducted group. Pts appeared to be active listeners and able to participate when prompted.     Status After Intervention:  Improved    Participation Level: Active Listener and Interactive    Participation Quality: Appropriate, Attentive, and Sharing      Speech:  normal      Thought Process/Content: Logical      Affective Functioning: Congruent      Mood: euthymic      Level of consciousness:  Alert, Oriented x4, and Attentive      Response to Learning: Able to verbalize/acknowledge new learning, Able to retain information, and Progressing to goal      Endings: None Reported    Modes of Intervention: Education, Support, Socialization, and Exploration      Discipline Responsible: Psychoeducational Specialist      Signature:  Marianela Simmons, CTRS

## 2024-12-06 LAB
ALBUMIN SERPL-MCNC: 3.3 G/DL (ref 3.5–5.2)
ALP SERPL-CCNC: 80 U/L (ref 35–104)
ALT SERPL-CCNC: 20 U/L (ref 0–32)
ANION GAP SERPL CALCULATED.3IONS-SCNC: 8 MMOL/L (ref 7–16)
AST SERPL-CCNC: 18 U/L (ref 0–31)
BASOPHILS # BLD: 0.03 K/UL (ref 0–0.2)
BASOPHILS NFR BLD: 1 % (ref 0–2)
BILIRUB SERPL-MCNC: 0.4 MG/DL (ref 0–1.2)
BUN SERPL-MCNC: 18 MG/DL (ref 6–23)
CALCIUM SERPL-MCNC: 9 MG/DL (ref 8.6–10.2)
CHLORIDE SERPL-SCNC: 105 MMOL/L (ref 98–107)
CO2 SERPL-SCNC: 28 MMOL/L (ref 22–29)
CREAT SERPL-MCNC: 0.9 MG/DL (ref 0.5–1)
EOSINOPHIL # BLD: 0.02 K/UL (ref 0.05–0.5)
EOSINOPHILS RELATIVE PERCENT: 0 % (ref 0–6)
ERYTHROCYTE [DISTWIDTH] IN BLOOD BY AUTOMATED COUNT: 13.8 % (ref 11.5–15)
GFR, ESTIMATED: 69 ML/MIN/1.73M2
GLUCOSE SERPL-MCNC: 95 MG/DL (ref 74–99)
HCT VFR BLD AUTO: 35 % (ref 34–48)
HGB BLD-MCNC: 11.1 G/DL (ref 11.5–15.5)
IMM GRANULOCYTES # BLD AUTO: 0.03 K/UL (ref 0–0.58)
IMM GRANULOCYTES NFR BLD: 1 % (ref 0–5)
LYMPHOCYTES NFR BLD: 1.47 K/UL (ref 1.5–4)
LYMPHOCYTES RELATIVE PERCENT: 24 % (ref 20–42)
MCH RBC QN AUTO: 29.8 PG (ref 26–35)
MCHC RBC AUTO-ENTMCNC: 31.7 G/DL (ref 32–34.5)
MCV RBC AUTO: 93.8 FL (ref 80–99.9)
MONOCYTES NFR BLD: 0.54 K/UL (ref 0.1–0.95)
MONOCYTES NFR BLD: 9 % (ref 2–12)
NEUTROPHILS NFR BLD: 66 % (ref 43–80)
NEUTS SEG NFR BLD: 3.99 K/UL (ref 1.8–7.3)
PLATELET # BLD AUTO: 197 K/UL (ref 130–450)
PMV BLD AUTO: 10.8 FL (ref 7–12)
POTASSIUM SERPL-SCNC: 4.4 MMOL/L (ref 3.5–5)
PROT SERPL-MCNC: 5.7 G/DL (ref 6.4–8.3)
RBC # BLD AUTO: 3.73 M/UL (ref 3.5–5.5)
SODIUM SERPL-SCNC: 141 MMOL/L (ref 132–146)
WBC OTHER # BLD: 6.1 K/UL (ref 4.5–11.5)

## 2024-12-06 PROCEDURE — 99232 SBSQ HOSP IP/OBS MODERATE 35: CPT

## 2024-12-06 PROCEDURE — 80053 COMPREHEN METABOLIC PANEL: CPT

## 2024-12-06 PROCEDURE — 85025 COMPLETE CBC W/AUTO DIFF WBC: CPT

## 2024-12-06 PROCEDURE — 36415 COLL VENOUS BLD VENIPUNCTURE: CPT

## 2024-12-06 PROCEDURE — 1240000000 HC EMOTIONAL WELLNESS R&B

## 2024-12-06 PROCEDURE — 6370000000 HC RX 637 (ALT 250 FOR IP)

## 2024-12-06 PROCEDURE — 6370000000 HC RX 637 (ALT 250 FOR IP): Performed by: FAMILY MEDICINE

## 2024-12-06 RX ADMIN — APIXABAN 5 MG: 5 TABLET, FILM COATED ORAL at 08:42

## 2024-12-06 RX ADMIN — VENLAFAXINE HYDROCHLORIDE 75 MG: 75 CAPSULE, EXTENDED RELEASE ORAL at 08:42

## 2024-12-06 RX ADMIN — TRAZODONE HYDROCHLORIDE 50 MG: 50 TABLET ORAL at 21:12

## 2024-12-06 RX ADMIN — PANTOPRAZOLE SODIUM 40 MG: 40 TABLET, DELAYED RELEASE ORAL at 08:42

## 2024-12-06 RX ADMIN — MIDODRINE HYDROCHLORIDE 2.5 MG: 2.5 TABLET ORAL at 16:50

## 2024-12-06 RX ADMIN — APIXABAN 5 MG: 5 TABLET, FILM COATED ORAL at 21:12

## 2024-12-06 RX ADMIN — MEMANTINE 5 MG: 5 TABLET ORAL at 08:42

## 2024-12-06 RX ADMIN — MIDODRINE HYDROCHLORIDE 2.5 MG: 2.5 TABLET ORAL at 08:42

## 2024-12-06 RX ADMIN — MEMANTINE 5 MG: 5 TABLET ORAL at 21:12

## 2024-12-06 RX ADMIN — METOPROLOL SUCCINATE 25 MG: 25 TABLET, EXTENDED RELEASE ORAL at 08:42

## 2024-12-06 RX ADMIN — MIDODRINE HYDROCHLORIDE 2.5 MG: 2.5 TABLET ORAL at 12:15

## 2024-12-06 ASSESSMENT — PAIN SCALES - GENERAL
PAINLEVEL_OUTOF10: 0
PAINLEVEL_OUTOF10: 0

## 2024-12-06 NOTE — PLAN OF CARE
Problem: Safety - Adult  Goal: Free from fall injury  Outcome: Progressing     Problem: Anxiety  Goal: Will report anxiety at manageable levels  Description: INTERVENTIONS:  1. Administer medication as ordered  2. Teach and rehearse alternative coping skills  3. Provide emotional support with 1:1 interaction with staff  12/6/2024 0845 by Micaela Farley RN  Outcome: Progressing     Problem: Coping  Goal: Pt/Family able to verbalize concerns and demonstrate effective coping strategies  Description: INTERVENTIONS:  1. Assist patient/family to identify coping skills, available support systems and cultural and spiritual values  2. Provide emotional support, including active listening and acknowledgement of concerns of patient and caregivers  3. Reduce environmental stimuli, as able  4. Instruct patient/family in relaxation techniques, as appropriate  5. Assess for spiritual pain/suffering and initiate Spiritual Care, Psychosocial Clinical Specialist consults as needed  12/6/2024 0845 by Micaela Farley RN  Outcome: Progressing     Problem: Depression/Self Harm  Goal: Effect of psychiatric condition will be minimized and patient will be protected from self harm  Description: INTERVENTIONS:  1. Assess impact of patient's symptoms on level of functioning, self care needs and offer support as indicated  2. Assess patient/family knowledge of depression, impact on illness and need for teaching  3. Provide emotional support, presence and reassurance  4. Assess for possible suicidal thoughts or ideation. If patient expresses suicidal thoughts or statements do not leave alone, initiate Suicide Precautions, move to a room close to the nursing station and obtain sitter  5. Initiate consults as appropriate with Mental Health Professional, Spiritual Care, Psychosocial CNS, and consider a recommendation to the [facility] for a Psychiatric Consultation  12/6/2024 0845 by Micaela Farley RN  Outcome: Progressing     Patient

## 2024-12-06 NOTE — PROGRESS NOTES
Pt attended afternoon smoking cessation group. Pt appeared to be an active listener. Pt is able to share appropriately when prompted and asked facilitator relevant questions.  Pt was participant 1 of 6.    Electronically signed by Ariana Diamond on 12/6/2024 at 3:55 PM

## 2024-12-06 NOTE — PROGRESS NOTES
[facility]  Day 3 Interdisciplinary Treatment Plan NOTE    Review Date & Time: 12/6/2024 0900    Admission Type:        Reason for admission:     Estimated Length of Stay: 5-7 days  Estimated Discharge Date Update: to be determined by physician    PATIENT STRENGTHS:  Patient Strengths    Patient Strengths and Limitations:   Addictive Behavior:Addictive Behavior  In the Past 3 Months, Have You Felt or Has Someone Told You That You Have a Problem With  : None  Medical Problems:  Past Medical History:   Diagnosis Date    Depression     Post herpetic neuralgia        Risk:  Fall Risk   David Scale David Scale Score: 22  BVC    Change in scores no Changes to plan of Care no    Status EXAM:   Mental Status and Behavioral Exam  Normal: No  Level of Assistance: Independent/Self  Facial Expression: Flat, Sad (Brightens with conversation.)  Affect: Congruent  Level of Consciousness: Alert  Frequency of Checks: 4 times per hour, close  Mood:Normal: No  Mood: Anxious  Motor Activity:Normal: Yes  Motor Activity: Decreased  Eye Contact: Good  Observed Behavior: Cooperative, Friendly  Sexual Misconduct History: Current - no  Preception: [place] to person, [place] to time, [place] to place, [place] to situation  Attention:Normal: Yes  Attention: Distractible  Thought Processes: Unremarkable  Thought Content:Normal: Yes  Thought Content: Other (comment) (normal)  Depression Symptoms: No problems reported or observed.  Anxiety Symptoms: No problems reported or observed.  Cassidy Symptoms: No problems reported or observed.  Hallucinations: None  Delusions: No  Memory:Normal: No  Memory: Poor recent  Insight and Judgment: No  Insight and Judgment: Poor insight, Poor judgment    Daily Assessment Last Entry:   Daily Sleep (WDL): Within Defined Limits            Daily Nutrition (WDL): Within Defined Limits  Level of Assistance: Independent/Self    Patient Monitoring:  Frequency of Checks: 4 times per hour,

## 2024-12-06 NOTE — PROGRESS NOTES
BEHAVIORAL HEALTH FOLLOW-UP NOTE     12/6/2024     Patient was seen and examined in person, Chart reviewed   Patient's case discussed with staff/team    Chief Complaint: Suicidal ideation, suicide attempt    Interim History:   Patient was seen in her room she is lying in bed she states that she is can at her and that for little bit.  She does states she is sleeping better and feels more rested in the morning.  She does remain flat initially but does brighten with conversation.  She states that her 's been coming to visit and is very supportive.  She then begins to smile and laugh on talking about her .  She does denies suicidal ideation, denies homicidal ideation she denies auditory and visual hallucinations.  She has been taking her medications, she has had no behavioral disturbances on the unit, she is social with select peers and going to groups.  Sleep and appetite reported to be fair    Appetite: [x] Normal/Unchanged  [] Increased  [] Decreased      Sleep:       [x] Normal/Unchanged  [] Fair       [] Poor              Energy:    [x] Normal/Unchanged  [] Increased  [] Decreased        SI [] Present  [x] Absent    HI  []Present  [x] Absent     Aggression:  [] yes  [x] no    Patient is [x] able  [] unable to CONTRACT FOR SAFETY     PAST MEDICAL/PSYCHIATRIC HISTORY:   Past Medical History:   Diagnosis Date    Depression     Post herpetic neuralgia        FAMILY/SOCIAL HISTORY:  No family history on file.  Social History     Socioeconomic History    Marital status:      Spouse name: Not on file    Number of children: Not on file    Years of education: Not on file    Highest education level: Not on file   Occupational History    Not on file   Tobacco Use    Smoking status: Never    Smokeless tobacco: Never   Vaping Use    Vaping status: Never Used   Substance and Sexual Activity    Alcohol use: Not Currently     Comment: occ    Drug use: Never    Sexual activity: Not on file   Other Topics

## 2024-12-06 NOTE — PLAN OF CARE
Problem: Anxiety  Goal: Will report anxiety at manageable levels  Description: INTERVENTIONS:  1. Administer medication as ordered  2. Teach and rehearse alternative coping skills  3. Provide emotional support with 1:1 interaction with staff  12/5/2024 2156 by Kosta Coker, RN  Outcome: Progressing     Problem: Coping  Goal: Pt/Family able to verbalize concerns and demonstrate effective coping strategies  Description: INTERVENTIONS:  1. Assist patient/family to identify coping skills, available support systems and cultural and spiritual values  2. Provide emotional support, including active listening and acknowledgement of concerns of patient and caregivers  3. Reduce environmental stimuli, as able  4. Instruct patient/family in relaxation techniques, as appropriate  5. Assess for spiritual pain/suffering and initiate Spiritual Care, Psychosocial Clinical Specialist consults as needed  12/5/2024 2156 by Kosta Coker, RN  Outcome: Progressing     Problem: Depression/Self Harm  Goal: Effect of psychiatric condition will be minimized and patient will be protected from self harm  Description: INTERVENTIONS:  1. Assess impact of patient's symptoms on level of functioning, self care needs and offer support as indicated  2. Assess patient/family knowledge of depression, impact on illness and need for teaching  3. Provide emotional support, presence and reassurance  4. Assess for possible suicidal thoughts or ideation. If patient expresses suicidal thoughts or statements do not leave alone, initiate Suicide Precautions, move to a room close to the nursing station and obtain sitter  5. Initiate consults as appropriate with Mental Health Professional, Spiritual Care, Psychosocial CNS, and consider a recommendation to the [facility] for a Psychiatric Consultation  12/5/2024 2156 by Kosta Coker, RN  Outcome: Progressing  Flowsheets (Taken 12/5/2024 2154)  Effect of psychiatric condition will be minimized and patient will

## 2024-12-06 NOTE — GROUP NOTE
Shared goal for the day as to walk and try to read.                                                                       Group Therapy Note    Date: 12/6/2024    Group Start Time: 0945  Group End Time: 1005  Group Topic: Community Meeting    [facility]  1    Marianela Simmons, SAADIAS    Type of Group: Community Meeting      Patient's Goal:  Patient will be able to id staffing assignments, expectations of patients, and general information re: floor rules. Will be prompted to share goal for the day.     Notes:  Patient appeared to be an active listener, taking in information presented and was prompted to share goal for the day.    Status After Intervention:  Improved    Participation Level: Active Listener and Interactive    Participation Quality: Appropriate, Attentive, and Sharing      Speech:  normal      Thought Process/Content: Logical      Affective Functioning: Congruent      Mood: euthymic      Level of consciousness:  Alert, Oriented x4, and Attentive      Response to Learning: Able to verbalize/acknowledge new learning and Able to retain information      Endings: None Reported    Modes of Intervention: Support, Socialization, and Clarifying      Discipline Responsible: Psychoeducational Specialist      Signature:  SAADIA BolivarS

## 2024-12-06 NOTE — GROUP NOTE
Group Therapy Note    Date: 12/6/2024    Group Start Time: 1005  Group End Time: 1040  Group Topic: Psychoeducation    [facility]    Marianela Simmons, CTRS    Date: 12/6/2024  Module Name:  physical and mental grounding     Patient's Goal:  pt will be able to id daily different grounding exercising to stop progression of ruminating thoughts and anxiety levels.     Notes:  Pleasant and engaged in group, sharing appropriately, and accepting of handout.     Status After Intervention:  Improved    Participation Level: Active Listener and Interactive    Participation Quality: Appropriate, Attentive, and Sharing      Speech:  normal      Thought Process/Content: Logical      Affective Functioning: Congruent      Mood: euthymic      Level of consciousness:  Alert, Oriented x4, and Attentive      Response to Learning: Able to verbalize/acknowledge new learning, Able to retain information, and Progressing to goal      Endings: None Reported    Modes of Intervention: Support, Socialization, Exploration, and Problem-solving      Discipline Responsible: Psychoeducational Specialist      Signature:  Marianela Simmons CTRS

## 2024-12-07 PROCEDURE — 6370000000 HC RX 637 (ALT 250 FOR IP): Performed by: FAMILY MEDICINE

## 2024-12-07 PROCEDURE — 99232 SBSQ HOSP IP/OBS MODERATE 35: CPT

## 2024-12-07 PROCEDURE — 1240000000 HC EMOTIONAL WELLNESS R&B

## 2024-12-07 PROCEDURE — 6370000000 HC RX 637 (ALT 250 FOR IP)

## 2024-12-07 RX ORDER — MIRTAZAPINE 15 MG/1
15 TABLET, FILM COATED ORAL NIGHTLY
Status: DISCONTINUED | OUTPATIENT
Start: 2024-12-07 | End: 2024-12-10 | Stop reason: HOSPADM

## 2024-12-07 RX ADMIN — MEMANTINE 5 MG: 5 TABLET ORAL at 20:14

## 2024-12-07 RX ADMIN — APIXABAN 5 MG: 5 TABLET, FILM COATED ORAL at 20:14

## 2024-12-07 RX ADMIN — MIDODRINE HYDROCHLORIDE 2.5 MG: 2.5 TABLET ORAL at 09:51

## 2024-12-07 RX ADMIN — MIDODRINE HYDROCHLORIDE 2.5 MG: 2.5 TABLET ORAL at 18:32

## 2024-12-07 RX ADMIN — MEMANTINE 5 MG: 5 TABLET ORAL at 08:48

## 2024-12-07 RX ADMIN — METOPROLOL SUCCINATE 25 MG: 25 TABLET, EXTENDED RELEASE ORAL at 08:48

## 2024-12-07 RX ADMIN — MIDODRINE HYDROCHLORIDE 2.5 MG: 2.5 TABLET ORAL at 13:25

## 2024-12-07 RX ADMIN — VENLAFAXINE HYDROCHLORIDE 75 MG: 75 CAPSULE, EXTENDED RELEASE ORAL at 08:48

## 2024-12-07 RX ADMIN — APIXABAN 5 MG: 5 TABLET, FILM COATED ORAL at 08:48

## 2024-12-07 RX ADMIN — PANTOPRAZOLE SODIUM 40 MG: 40 TABLET, DELAYED RELEASE ORAL at 08:47

## 2024-12-07 RX ADMIN — MIRTAZAPINE 15 MG: 15 TABLET, FILM COATED ORAL at 20:14

## 2024-12-07 NOTE — PROGRESS NOTES
BEHAVIORAL HEALTH FOLLOW-UP NOTE     12/7/2024     Patient was seen and examined in person, Chart reviewed   Patient's case discussed with staff/team    Chief Complaint: Suicidal ideation, suicide attempt    Interim History:   Patient was seen sitting out on the unit socializing eating her breakfast she is currently calm, cooperative, pleasant she does continue to appear flat but does brighten with conversation.  She states she woke up feeling a little more depressed today she is requesting the trazodone be stopped and Remeron put back on.  States she felt she was doing better on the Remeron told patient I would do that.  She does denies suicidal ideation, denies homicidal ideation she denies auditory visual hallucinations.  She has been taking her medication, she said no behavioral disturbances on the unit, she is social with peer and going to group.  Sleep and appetite reported to be fair      Appetite: [x] Normal/Unchanged  [] Increased  [] Decreased      Sleep:       [x] Normal/Unchanged  [] Fair       [] Poor              Energy:    [x] Normal/Unchanged  [] Increased  [] Decreased        SI [] Present  [x] Absent    HI  []Present  [x] Absent     Aggression:  [] yes  [x] no    Patient is [x] able  [] unable to CONTRACT FOR SAFETY     PAST MEDICAL/PSYCHIATRIC HISTORY:   Past Medical History:   Diagnosis Date    Depression     Post herpetic neuralgia        FAMILY/SOCIAL HISTORY:  No family history on file.  Social History     Socioeconomic History    Marital status:      Spouse name: Not on file    Number of children: Not on file    Years of education: Not on file    Highest education level: Not on file   Occupational History    Not on file   Tobacco Use    Smoking status: Never    Smokeless tobacco: Never   Vaping Use    Vaping status: Never Used   Substance and Sexual Activity    Alcohol use: Not Currently     Comment: occ    Drug use: Never    Sexual activity: Not on file   Other Topics Concern    Not

## 2024-12-07 NOTE — PLAN OF CARE
Patient denies suicidal/homicidal ideations and hallucinations. Patient denies anxiety and depression. Patient states she feels like her \"meds are kicking in\". Patient did state she was anxious this morning when she woke up and realized where she was at but that has subsided. Patient is taking ordered medications without issue. Continued support offered to patient. Safety rounds continue.     Problem: Anxiety  Goal: Will report anxiety at manageable levels  Description: INTERVENTIONS:  1. Administer medication as ordered  2. Teach and rehearse alternative coping skills  3. Provide emotional support with 1:1 interaction with staff  12/6/2024 2145 by Carole Combs, RN  Outcome: Progressing     Problem: Coping  Goal: Pt/Family able to verbalize concerns and demonstrate effective coping strategies  Description: INTERVENTIONS:  1. Assist patient/family to identify coping skills, available support systems and cultural and spiritual values  2. Provide emotional support, including active listening and acknowledgement of concerns of patient and caregivers  3. Reduce environmental stimuli, as able  4. Instruct patient/family in relaxation techniques, as appropriate  5. Assess for spiritual pain/suffering and initiate Spiritual Care, Psychosocial Clinical Specialist consults as needed  12/6/2024 2145 by Carole Combs, RN  Outcome: Progressing     Problem: Depression/Self Harm  Goal: Effect of psychiatric condition will be minimized and patient will be protected from self harm  Description: INTERVENTIONS:  1. Assess impact of patient's symptoms on level of functioning, self care needs and offer support as indicated  2. Assess patient/family knowledge of depression, impact on illness and need for teaching  3. Provide emotional support, presence and reassurance  4. Assess for possible suicidal thoughts or ideation. If patient expresses suicidal thoughts or statements do not leave alone, initiate Suicide Precautions, move to a

## 2024-12-07 NOTE — GROUP NOTE
Group Therapy Note    Date: 12/7/2024  Start Time: 0745  End Time:  0805  Number of Participants: 11    Type of Group: Community Meeting    Wellness Binder Information  Module Name:  Community Meeting      Patient's Goal:  Patient will be oriented to the unit including but not limited expectations, staff, programming schedule.  Patient will be able to ID expectations of treatment.     Notes: Patient was a participant in community meeting, and was updated on expectations of the unit, staffing, programming schedule, and acclimated to their environment.    Patient shared goal for today as \"walking\"    Status After Intervention:  Improved    Participation Level: Active Listener and Interactive    Participation Quality: Appropriate and Attentive      Speech:  normal      Thought Process/Content: Logical      Affective Functioning: Congruent      Mood: euthymic      Level of consciousness:  Alert and Attentive      Response to Learning: Able to verbalize current knowledge/experience, Able to verbalize/acknowledge new learning, and Progressing to goal      Endings: None Reported    Modes of Intervention: Exploration, Clarifying, and Problem-solving      Discipline Responsible: Recreational Therapist      Signature:  ANA Casarez

## 2024-12-07 NOTE — GROUP NOTE
Date: 12/7/2024  Start Time: 1045  End Time:  1125  Number of Participants: 7    Type of Group: Psychoeducation    Wellness Binder Information  Module Name:  Supporting someone with depression    Patient's Goal:  Patient will be able to ID at least one way to enhance support.  Patient will explore opportunities to enhance support and utilize community resources.    Notes:  CTRS led educational discussion on how to support someone with mental health.  CTRS provided information on local resources to enhance one's support system.  Patient was an active participant in discussion and was accepting of handout.  Patient able to ID at least one way to enhance one's support.      Status After Intervention:  Improved    Status After Intervention:  Improved    Participation Level: Active Listener and Interactive    Participation Quality: Appropriate and Attentive      Speech:  normal      Thought Process/Content: Logical      Affective Functioning: Congruent      Mood: anxious      Level of consciousness:  Alert and Attentive      Response to Learning: Able to verbalize current knowledge/experience, Able to verbalize/acknowledge new learning, and Progressing to goal      Endings: None Reported    Modes of Intervention: Education, Support, Exploration, and Clarifying      Discipline Responsible: Recreational Therapist      Signature:  ANA Casarez

## 2024-12-07 NOTE — PLAN OF CARE
Problem: Coping  Goal: Pt/Family able to verbalize concerns and demonstrate effective coping strategies  Description: INTERVENTIONS:  1. Assist patient/family to identify coping skills, available support systems and cultural and spiritual values  2. Provide emotional support, including active listening and acknowledgement of concerns of patient and caregivers  3. Reduce environmental stimuli, as able  4. Instruct patient/family in relaxation techniques, as appropriate  5. Assess for spiritual pain/suffering and initiate Spiritual Care, Psychosocial Clinical Specialist consults as needed  12/7/2024 1031 by Renata Miranda RN  Outcome: Progressing  12/6/2024 2145 by Carole Combs RN  Outcome: Progressing     Problem: Depression/Self Harm  Goal: Effect of psychiatric condition will be minimized and patient will be protected from self harm  Description: INTERVENTIONS:  1. Assess impact of patient's symptoms on level of functioning, self care needs and offer support as indicated  2. Assess patient/family knowledge of depression, impact on illness and need for teaching  3. Provide emotional support, presence and reassurance  4. Assess for possible suicidal thoughts or ideation. If patient expresses suicidal thoughts or statements do not leave alone, initiate Suicide Precautions, move to a room close to the nursing station and obtain sitter  5. Initiate consults as appropriate with Mental Health Professional, Spiritual Care, Psychosocial CNS, and consider a recommendation to the [facility] for a Psychiatric Consultation  12/6/2024 2145 by Carole Combs RN  Outcome: Progressing     Problem: Anxiety  Goal: Will report anxiety at manageable levels  Description: INTERVENTIONS:  1. Administer medication as ordered  2. Teach and rehearse alternative coping skills  3. Provide emotional support with 1:1 interaction with staff  12/7/2024 1031 by Renata Miranda RN  Outcome: Progressing  12/6/2024 2145 by Carole Combs

## 2024-12-07 NOTE — GROUP NOTE
Group Therapy Note    Date: 12/7/2024    Group Start Time: 1515  Group End Time: 1545  Group Topic: Recreational    [facility]    Kerline Bustamante CTRS    Date: 12/7/2024  Start Time: 1515  End Time:  1545  Number of Participants: 5    Type of Group: Recreational    Wellness Binder Information  Module Name:  reminiscing    Patient's Goal:  to increase socialization amongst peers.     Notes:  CTRS engaged in socialization with patients.  Patient was an active participant in reminiscing and was observed socializing with peers.      Status After Intervention:  Improved    Participation Level: Active Listener and Interactive    Participation Quality: Appropriate and Attentive      Speech:  normal      Thought Process/Content: Logical      Affective Functioning: Congruent      Mood: euthymic      Level of consciousness:  Alert and Attentive      Response to Learning: Able to verbalize current knowledge/experience, Able to verbalize/acknowledge new learning, and Progressing to goal      Endings: None Reported    Modes of Intervention: Socialization and Activity      Discipline Responsible: Recreational Therapist      Signature:  ANA Casarez    Group Therapy Note    Attendees: 5

## 2024-12-08 PROCEDURE — 6370000000 HC RX 637 (ALT 250 FOR IP): Performed by: FAMILY MEDICINE

## 2024-12-08 PROCEDURE — 99232 SBSQ HOSP IP/OBS MODERATE 35: CPT

## 2024-12-08 PROCEDURE — 6370000000 HC RX 637 (ALT 250 FOR IP)

## 2024-12-08 PROCEDURE — 1240000000 HC EMOTIONAL WELLNESS R&B

## 2024-12-08 RX ADMIN — VENLAFAXINE HYDROCHLORIDE 75 MG: 75 CAPSULE, EXTENDED RELEASE ORAL at 09:11

## 2024-12-08 RX ADMIN — MIDODRINE HYDROCHLORIDE 2.5 MG: 2.5 TABLET ORAL at 17:43

## 2024-12-08 RX ADMIN — APIXABAN 5 MG: 5 TABLET, FILM COATED ORAL at 09:11

## 2024-12-08 RX ADMIN — MIDODRINE HYDROCHLORIDE 2.5 MG: 2.5 TABLET ORAL at 09:11

## 2024-12-08 RX ADMIN — MIRTAZAPINE 15 MG: 15 TABLET, FILM COATED ORAL at 20:31

## 2024-12-08 RX ADMIN — MEMANTINE 5 MG: 5 TABLET ORAL at 20:31

## 2024-12-08 RX ADMIN — PANTOPRAZOLE SODIUM 40 MG: 40 TABLET, DELAYED RELEASE ORAL at 09:11

## 2024-12-08 RX ADMIN — MEMANTINE 5 MG: 5 TABLET ORAL at 09:11

## 2024-12-08 RX ADMIN — APIXABAN 5 MG: 5 TABLET, FILM COATED ORAL at 20:31

## 2024-12-08 RX ADMIN — MIDODRINE HYDROCHLORIDE 2.5 MG: 2.5 TABLET ORAL at 12:26

## 2024-12-08 RX ADMIN — METOPROLOL SUCCINATE 25 MG: 25 TABLET, EXTENDED RELEASE ORAL at 09:11

## 2024-12-08 RX ADMIN — HYDROXYZINE HYDROCHLORIDE 50 MG: 50 TABLET, FILM COATED ORAL at 20:36

## 2024-12-08 ASSESSMENT — PAIN SCALES - GENERAL: PAINLEVEL_OUTOF10: 0

## 2024-12-08 NOTE — PLAN OF CARE
Patient denies suicidal/homicidal ideations and hallucinations. Patient denies anxiety and depression. Patient is taking ordered medications without issue. Continued support offered to patient. Safety rounds continue.     Problem: Safety - Adult  Goal: Free from fall injury  Outcome: Progressing     Problem: Anxiety  Goal: Will report anxiety at manageable levels  Description: INTERVENTIONS:  1. Administer medication as ordered  2. Teach and rehearse alternative coping skills  3. Provide emotional support with 1:1 interaction with staff  12/7/2024 2300 by Carole Combs RN  Outcome: Progressing     Problem: Coping  Goal: Pt/Family able to verbalize concerns and demonstrate effective coping strategies  Description: INTERVENTIONS:  1. Assist patient/family to identify coping skills, available support systems and cultural and spiritual values  2. Provide emotional support, including active listening and acknowledgement of concerns of patient and caregivers  3. Reduce environmental stimuli, as able  4. Instruct patient/family in relaxation techniques, as appropriate  5. Assess for spiritual pain/suffering and initiate Spiritual Care, Psychosocial Clinical Specialist consults as needed  12/7/2024 2300 by Carole Combs, RN  Outcome: Progressing

## 2024-12-08 NOTE — GROUP NOTE
Group Therapy Note    Date: 12/8/2024    Group Start Time: 1025  Group End Time: 1040  Group Topic: Community Meeting    [facility]    Hallie Hutchins CTRS    Group Therapy Note    Attendees: 9    Date: 12/8/2024  Start Time: 1025  End Time:  1040  Number of Participants: 9    Type of Group: Community Meeting    Patient's Goal:  Increased awareness of functions of the milieu, daily staffing and programming. Identified goal for the day.     Notes:  Patient was an active listener in group. Patient shared goal for the day as, \"Walking and reading.\"    Status After Intervention:  Improved    Participation Level: Active Listener and Interactive    Participation Quality: Appropriate, Attentive, and Sharing      Speech:  normal      Thought Process/Content: Logical  Linear      Affective Functioning: Congruent      Mood:  Appropriate      Level of consciousness:  Alert and Attentive      Response to Learning: Able to verbalize current knowledge/experience, Able to verbalize/acknowledge new learning, Able to retain information, Capable of insight, Able to change behavior, and Progressing to goal      Endings: None Reported    Modes of Intervention: Education, Support, Socialization, Exploration, Clarifying, and Problem-solving      Discipline Responsible: Psychoeducational Specialist      Signature:  ANA Mahmood

## 2024-12-08 NOTE — PROGRESS NOTES
BEHAVIORAL HEALTH FOLLOW-UP NOTE     12/8/2024     Patient was seen and examined in person, Chart reviewed   Patient's case discussed with staff/team    Chief Complaint: Suicidal ideation, suicide attempt    Interim History:   Patient was seen sitting out on the unit reading a book she is currently calm pleasant and cooperative she does appear slightly flat but does brighten with conversation.  She states that she woke up little irritable today but she states as the day is progressing she is feeling better.  She states she is looking forward to visiting with her  who is very supportive.  She denies suicidal ideation, denies homicidal ideation she denies auditory visual hallucinations.  She has been taking her medications, she has had no behavioral disturbances on the unit, she is social with peers and going to group.  Sleep and appetite reported to be fair    Appetite: [x] Normal/Unchanged  [] Increased  [] Decreased      Sleep:       [x] Normal/Unchanged  [] Fair       [] Poor              Energy:    [x] Normal/Unchanged  [] Increased  [] Decreased        SI [] Present  [x] Absent    HI  []Present  [x] Absent     Aggression:  [] yes  [x] no    Patient is [x] able  [] unable to CONTRACT FOR SAFETY     PAST MEDICAL/PSYCHIATRIC HISTORY:   Past Medical History:   Diagnosis Date    Depression     Post herpetic neuralgia        FAMILY/SOCIAL HISTORY:  No family history on file.  Social History     Socioeconomic History    Marital status:      Spouse name: Not on file    Number of children: Not on file    Years of education: Not on file    Highest education level: Not on file   Occupational History    Not on file   Tobacco Use    Smoking status: Never    Smokeless tobacco: Never   Vaping Use    Vaping status: Never Used   Substance and Sexual Activity    Alcohol use: Not Currently     Comment: occ    Drug use: Never    Sexual activity: Not on file   Other Topics Concern    Not on file   Social History    Gait - steady  Medication side effects(SE):     Mental Status Examination:    Level of consciousness:  within normal limits   Appearance:  well-appearing  Behavior/Motor:  no abnormalities noted  Attitude toward examiner:  cooperative  Speech:  spontaneous, normal rate, normal volume, and well articulated   Mood: \"okay\"  Affect:  mood congruent brightens with conversation  Thought processes:  linear   Thought content: Devoid of auditory visual donations, delusions or any other perceptual abnormalities.  Currently denies suicidal ideation intent or plan.  Denies homicidal ideation intent or plan  Cognition:  oriented to person, place, and time   Concentration intact  Memory intact  Insight Limited  Judgement Limited  Fund of Knowledge adequate    ASSESSMENT: Patient symptoms are:  [] Well controlled  [x] Improving  [] Worsening  [] No change      Diagnosis:  Principal Problem:    Major depressive disorder, recurrent episode, severe with anxious distress (HCC)  Resolved Problems:    * No resolved hospital problems. *      LABS:    Recent Labs     12/06/24  0803   WBC 6.1   HGB 11.1*        Recent Labs     12/06/24  0803      K 4.4      CO2 28   BUN 18   CREATININE 0.9   GLUCOSE 95     Recent Labs     12/06/24  0803   BILITOT 0.4   ALKPHOS 80   AST 18   ALT 20     Lab Results   Component Value Date/Time    BARBSCNU NEGATIVE 11/25/2024 10:30 PM    LABBENZ POSITIVE 11/25/2024 10:30 PM    LABMETH NEGATIVE 11/25/2024 10:30 PM    ETOH <10 11/25/2024 08:29 PM     Lab Results   Component Value Date/Time    TSH 1.80 11/30/2024 02:39 AM     No results found for: \"LITHIUM\"  No results found for: \"VALPROATE\", \"CBMZ\"        Treatment Plan:  Reviewed current Medications with the patient.   Risks, benefits, side effects, drug-to-drug interactions and alternatives to treatment were discussed.  Collateral information:   CD evaluation  Encourage patient to attend group and other milieu activities.  Discharge

## 2024-12-08 NOTE — PLAN OF CARE
Problem: Safety - Adult  Goal: Free from fall injury  12/8/2024 0954 by Renata Miranda RN  Outcome: Progressing  12/7/2024 2300 by Carole Combs RN  Outcome: Progressing     Problem: Anxiety  Goal: Will report anxiety at manageable levels  Description: INTERVENTIONS:  1. Administer medication as ordered  2. Teach and rehearse alternative coping skills  3. Provide emotional support with 1:1 interaction with staff  12/7/2024 2300 by Carole Combs RN  Outcome: Progressing     Problem: Coping  Goal: Pt/Family able to verbalize concerns and demonstrate effective coping strategies  Description: INTERVENTIONS:  1. Assist patient/family to identify coping skills, available support systems and cultural and spiritual values  2. Provide emotional support, including active listening and acknowledgement of concerns of patient and caregivers  3. Reduce environmental stimuli, as able  4. Instruct patient/family in relaxation techniques, as appropriate  5. Assess for spiritual pain/suffering and initiate Spiritual Care, Psychosocial Clinical Specialist consults as needed  12/8/2024 0954 by Renata Miranda, RN  Outcome: Progressing  12/7/2024 2300 by Carole Combs RN  Outcome: Progressing

## 2024-12-08 NOTE — CARE COORDINATION
Sw met with pt to check in.  Pt stated that she is in need of a medication change.  Explained to pt to speak with doctor and / or NP tomorrow. Sw offered support and pt thanked cherie for speaking with her.

## 2024-12-08 NOTE — GROUP NOTE
Group Therapy Note    Date: 12/8/2024    Group Start Time: 1040  Group End Time: 1110  Group Topic: Psychoeducation    [facility]    Hallie Hutchins CTRS    Group Therapy Note    Attendees: 10    Date: 12/8/2024  Start Time: 1040  End Time:  1110  Number of Participants: 10    Type of Group: Psychoeducation    Name:  Stress and Wellbeing     Patient's Goal:  Increased awareness of how stress affects mental and physical health, differences between good and bad stress and healthy ways to cope with stress.     Notes:  CTRS led educational group discussion on stress and wellbeing. Encouraged patients to share their experiences. Patient was actively engaged in group discussion and made positive responses.    Status After Intervention:  Improved    Participation Level: Active Listener and Interactive    Participation Quality: Appropriate, Attentive, and Sharing      Speech:  normal      Thought Process/Content: Logical  Linear      Affective Functioning: Congruent      Mood: Appropriate      Level of consciousness:  Alert and Attentive      Response to Learning: Able to verbalize current knowledge/experience, Able to verbalize/acknowledge new learning, Able to retain information, Capable of insight, Able to change behavior, and Progressing to goal      Endings: None Reported    Modes of Intervention: Education, Support, Socialization, Exploration, Clarifying, and Problem-solving      Discipline Responsible: Psychoeducational Specialist      Signature:  ANA Mahmood

## 2024-12-09 PROCEDURE — 6370000000 HC RX 637 (ALT 250 FOR IP): Performed by: FAMILY MEDICINE

## 2024-12-09 PROCEDURE — 99232 SBSQ HOSP IP/OBS MODERATE 35: CPT

## 2024-12-09 PROCEDURE — 1240000000 HC EMOTIONAL WELLNESS R&B

## 2024-12-09 PROCEDURE — 6370000000 HC RX 637 (ALT 250 FOR IP)

## 2024-12-09 RX ADMIN — APIXABAN 5 MG: 5 TABLET, FILM COATED ORAL at 21:12

## 2024-12-09 RX ADMIN — PANTOPRAZOLE SODIUM 40 MG: 40 TABLET, DELAYED RELEASE ORAL at 08:57

## 2024-12-09 RX ADMIN — VENLAFAXINE HYDROCHLORIDE 75 MG: 75 CAPSULE, EXTENDED RELEASE ORAL at 08:57

## 2024-12-09 RX ADMIN — MIDODRINE HYDROCHLORIDE 2.5 MG: 2.5 TABLET ORAL at 17:11

## 2024-12-09 RX ADMIN — MEMANTINE 5 MG: 5 TABLET ORAL at 08:57

## 2024-12-09 RX ADMIN — HYDROXYZINE HYDROCHLORIDE 50 MG: 50 TABLET, FILM COATED ORAL at 21:14

## 2024-12-09 RX ADMIN — MIDODRINE HYDROCHLORIDE 2.5 MG: 2.5 TABLET ORAL at 08:57

## 2024-12-09 RX ADMIN — MEMANTINE 5 MG: 5 TABLET ORAL at 21:12

## 2024-12-09 RX ADMIN — METOPROLOL SUCCINATE 25 MG: 25 TABLET, EXTENDED RELEASE ORAL at 08:57

## 2024-12-09 RX ADMIN — MIDODRINE HYDROCHLORIDE 2.5 MG: 2.5 TABLET ORAL at 11:40

## 2024-12-09 RX ADMIN — APIXABAN 5 MG: 5 TABLET, FILM COATED ORAL at 08:57

## 2024-12-09 RX ADMIN — Medication 3 MG: at 21:14

## 2024-12-09 RX ADMIN — MIRTAZAPINE 15 MG: 15 TABLET, FILM COATED ORAL at 21:13

## 2024-12-09 ASSESSMENT — PAIN SCALES - GENERAL: PAINLEVEL_OUTOF10: 0

## 2024-12-09 NOTE — GROUP NOTE
Date: 12/9/2024  Start Time: 1000  End Time:  1040  Number of Participants: 9    Type of Group: Psychoeducation    Wellness Binder Information  Module Name:  10 ways to take charge of your holiday plans     Patient's Goal:  Patient will be able to ID at least one way to improve holiday/winter season to help reduce holiday time stressors.     Notes:  CTRS led educational discussion on ways to take charge of holiday plans, and different examples to help reduce holiday stressors.  Patient was an active participant in discussion and was accepting of handout.  Patient able to ID at least one way to take charge of the holiday season.      Status After Intervention:  Improved    Participation Level: Active Listener and Interactive    Participation Quality: Appropriate and Attentive      Speech:  normal      Thought Process/Content: Logical      Affective Functioning: Congruent      Mood: euthymic      Level of consciousness:  Alert and Attentive      Response to Learning: Able to verbalize current knowledge/experience, Able to verbalize/acknowledge new learning, and Progressing to goal      Endings: None Reported    Modes of Intervention: Education, Exploration, Clarifying, and Problem-solving      Discipline Responsible: Recreational Therapist      Signature:  ANA Casarez

## 2024-12-09 NOTE — GROUP NOTE
Group Therapy Note    Date: 12/9/2024  Start Time: 0800  End Time:  0820  Number of Participants: 11    Type of Group: Community Meeting    Wellness Binder Information  Module Name:  Community Meeting      Patient's Goal:  Patient will be oriented to the unit including but not limited expectations, staff, programming schedule.  Patient will be able to ID expectations of treatment.     Notes: Patient was a participant in community meeting, and was updated on expectations of the unit, staffing, programming schedule, and acclimated to their environment.    Patient shared goal for today as \"attend community meeting\"    Status After Intervention:  Improved    Participation Level: Active Listener and Interactive    Participation Quality: Appropriate and Attentive      Speech:  normal      Thought Process/Content: Logical      Affective Functioning: Congruent      Mood: euthymic      Level of consciousness:  Alert and Attentive      Response to Learning: Able to verbalize current knowledge/experience, Able to verbalize/acknowledge new learning, and Progressing to goal      Endings: None Reported    Modes of Intervention: Exploration, Clarifying, and Problem-solving      Discipline Responsible: Recreational Therapist      Signature:  ANA Casarez

## 2024-12-09 NOTE — PLAN OF CARE
Problem: Anxiety  Goal: Will report anxiety at manageable levels  Description: INTERVENTIONS:  1. Administer medication as ordered  2. Teach and rehearse alternative coping skills  3. Provide emotional support with 1:1 interaction with staff  12/9/2024 0958 by Renata Miranda RN  Outcome: Progressing  12/8/2024 2124 by Kosta Coker RN  Outcome: Progressing     Problem: Coping  Goal: Pt/Family able to verbalize concerns and demonstrate effective coping strategies  Description: INTERVENTIONS:  1. Assist patient/family to identify coping skills, available support systems and cultural and spiritual values  2. Provide emotional support, including active listening and acknowledgement of concerns of patient and caregivers  3. Reduce environmental stimuli, as able  4. Instruct patient/family in relaxation techniques, as appropriate  5. Assess for spiritual pain/suffering and initiate Spiritual Care, Psychosocial Clinical Specialist consults as needed  12/9/2024 0958 by Renata Miranda RN  Outcome: Progressing  12/8/2024 2124 by Kosta Coker RN  Outcome: Progressing     Problem: Depression/Self Harm  Goal: Effect of psychiatric condition will be minimized and patient will be protected from self harm  Description: INTERVENTIONS:  1. Assess impact of patient's symptoms on level of functioning, self care needs and offer support as indicated  2. Assess patient/family knowledge of depression, impact on illness and need for teaching  3. Provide emotional support, presence and reassurance  4. Assess for possible suicidal thoughts or ideation. If patient expresses suicidal thoughts or statements do not leave alone, initiate Suicide Precautions, move to a room close to the nursing station and obtain sitter  5. Initiate consults as appropriate with Mental Health Professional, Spiritual Care, Psychosocial CNS, and consider a recommendation to the [facility] for a Psychiatric Consultation  12/9/2024 0958 by Renata Miranda  RN  Outcome: Progressing  12/8/2024 2124 by Kosta Coker RN  Outcome: Progressing  Flowsheets (Taken 12/8/2024 2124)  Effect of psychiatric condition will be minimized and patient will be protected from self harm:   Assess impact of patient’s symptoms on level of functioning, self care needs and offer support as indicated   Provide emotional support, presence and reassurance

## 2024-12-09 NOTE — PROGRESS NOTES
IntraVENous, PRN, Susan Simmons APRN - CNP    magnesium sulfate 2000 mg in 50 mL IVPB premix, 2,000 mg, IntraVENous, PRN, Susan Simmons APRN - CNP    ondansetron (ZOFRAN-ODT) disintegrating tablet 4 mg, 4 mg, Oral, Q8H PRN **OR** ondansetron (ZOFRAN) injection 4 mg, 4 mg, IntraVENous, Q6H PRN, Susan Simmons APRN - CNP    polyethylene glycol (GLYCOLAX) packet 17 g, 17 g, Oral, Daily PRN, Susan Simmons APRN - CNP    acetaminophen (TYLENOL) tablet 650 mg, 650 mg, Oral, Q6H PRN **OR** acetaminophen (TYLENOL) suppository 650 mg, 650 mg, Rectal, Q6H PRN, Susan Simmons APRN - CNP    aluminum & magnesium hydroxide-simethicone (MAALOX) 200-200-20 MG/5ML suspension 30 mL, 30 mL, Oral, PRN, Gayatri Teixeira APRN - CNP    apixaban (ELIQUIS) tablet 5 mg, 5 mg, Oral, BID, Gayatri Teixeira APRN - CNP, 5 mg at 12/09/24 0857    hydrOXYzine HCl (ATARAX) tablet 50 mg, 50 mg, Oral, TID PRN, Gayatri Teixeira APRN - CNP, 50 mg at 12/08/24 2036    melatonin tablet 3 mg, 3 mg, Oral, Nightly PRN, Gayatri Teixeira APRN - CNP    memantine (NAMENDA) tablet 5 mg, 5 mg, Oral, BID, Gayatri Teixeira APRN - CNP, 5 mg at 12/09/24 0857    metoprolol succinate (TOPROL XL) extended release tablet 25 mg, 25 mg, Oral, Daily, Gayatri Teixeira APRN - CNP, 25 mg at 12/09/24 0857    midodrine (PROAMATINE) tablet 2.5 mg, 2.5 mg, Oral, TID WC, Gayatri Teixeira APRN - CNP, 2.5 mg at 12/09/24 0857    nicotine (NICODERM CQ) 21 MG/24HR 1 patch, 1 patch, TransDERmal, Daily, Gayatri Teixeira APRN - CNP    pantoprazole (PROTONIX) tablet 40 mg, 40 mg, Oral, Daily, Gayatri Teixeira APRN - CNP, 40 mg at 12/09/24 0857    venlafaxine (EFFEXOR XR) extended release capsule 75 mg, 75 mg, Oral, Daily with breakfast, Gayatri Teixeira APRN - CNP, 75 mg at 12/09/24 0857      Examination:  BP (!) 148/73   Pulse 67   Temp 98.4 °F (36.9 °C) (Temporal)   Resp 16   Ht 1.6 m (5' 3\")   Wt 54.4 kg (120 lb)   SpO2 96%   BMI 21.26 kg/m²  planning discussed with the patient and treatment team.    New Medications started during this admission :    Effexor XR 75 mg daily  Remeron 15 mg nightly  Namenda 5 mg twice daily     Prn Haldol 5mg and Vistaril 50mg q6hr for extreme agitation.  Melatonin as needed for insomnia  Vistaril as ordered for anxiety    PSYCHOTHERAPY/COUNSELING:  [x] Therapeutic interview  [x] Supportive  [] CBT  [] Ongoing  [] Other    [x] Patient continues to need, on a daily basis, active treatment furnished directly by or requiring the supervision of inpatient psychiatric personnel      Anticipated Length of stay: 3 to 7 days based on stability        NOTE: This report was transcribed using voice recognition software. Every effort was made to ensure accuracy; however, inadvertent computerized transcription errors may be present.     Electronically signed by MADDISON Morris CNP on 12/9/2024 at 11:04 AM

## 2024-12-09 NOTE — PLAN OF CARE
Problem: Anxiety  Goal: Will report anxiety at manageable levels  Description: INTERVENTIONS:  1. Administer medication as ordered  2. Teach and rehearse alternative coping skills  3. Provide emotional support with 1:1 interaction with staff  Outcome: Progressing     Problem: Coping  Goal: Pt/Family able to verbalize concerns and demonstrate effective coping strategies  Description: INTERVENTIONS:  1. Assist patient/family to identify coping skills, available support systems and cultural and spiritual values  2. Provide emotional support, including active listening and acknowledgement of concerns of patient and caregivers  3. Reduce environmental stimuli, as able  4. Instruct patient/family in relaxation techniques, as appropriate  5. Assess for spiritual pain/suffering and initiate Spiritual Care, Psychosocial Clinical Specialist consults as needed  12/8/2024 2124 by Kosta Coker RN  Outcome: Progressing     Problem: Depression/Self Harm  Goal: Effect of psychiatric condition will be minimized and patient will be protected from self harm  Description: INTERVENTIONS:  1. Assess impact of patient's symptoms on level of functioning, self care needs and offer support as indicated  2. Assess patient/family knowledge of depression, impact on illness and need for teaching  3. Provide emotional support, presence and reassurance  4. Assess for possible suicidal thoughts or ideation. If patient expresses suicidal thoughts or statements do not leave alone, initiate Suicide Precautions, move to a room close to the nursing station and obtain sitter  5. Initiate consults as appropriate with Mental Health Professional, Spiritual Care, Psychosocial CNS, and consider a recommendation to the [facility] for a Psychiatric Consultation  Outcome: Progressing  Flowsheets (Taken 12/8/2024 2124)  Effect of psychiatric condition will be minimized and patient will be protected from self harm:   Assess impact of patient’s symptoms on level of  functioning, self care needs and offer support as indicated   Provide emotional support, presence and reassurance    Patient denies suicidal and homicidal ideations at this time.  Patient denies auditory/visual hallucinations at this time.  Patient denies depression levels, rates anxiety 3-4 pt states \"it's ok\" at this time. Patient reports having a nice visit with  and son. Patient also states she spoke with grandson on the phone and had a pleasant conversation.  Patient assessed in room, mostly isolative to room and withdrawn. Patient's affect is flat, but brightens with conversation. Patient is calm, cooperative, friendly at this time. Patient social with roommate. Patient reports interrupted sleep and wanting a sleeping pill, but refuses PRN Melatonin stating \"that makes me wake up throughout the night\". Patient is medication compliant at this time.  No acute behavioral concerns voiced/noted at this time.

## 2024-12-09 NOTE — GROUP NOTE
Date: 12/9/2024  Start Time: 1530  Number of Participants: 7    Type of Group: Recreational    Wellness Binder Information    Module Name:  [employer] Therapy    Patient's Goal:  Patient will exhibit a more improved mood and minimize symptoms of depression.  To promote empathy, communication, and meaningful connections via talks and reflections.       Notes:  Patient actively engaged in activity, and exhibited a receptive behavior.    Status After Intervention:  Improved    Participation Level: Active Listener and Interactive    Participation Quality: Appropriate and Attentive      Speech:  normal      Thought Process/Content: Logical      Affective Functioning: Congruent      Mood: euthymic      Level of consciousness:  Alert and Attentive      Response to Learning: Able to verbalize current knowledge/experience, Able to verbalize/acknowledge new learning, and Progressing to goal      Endings: None Reported    Modes of Intervention: Exploration and Activity      Discipline Responsible: Recreational Therapist      Signature:  ANA Casarez    Group Therapy Note    Attendees: 7

## 2024-12-09 NOTE — GROUP NOTE
Date: 12/9/2024  Start Time: 1145    Number of Participants: 8    Type of Group: Recreational    Wellness Binder Information  Module Name:  Today in History    Patient's Goal:  to maintain/promote cognitive function and to improve socialization amongst peers.     Notes:  Patient was an active participant in activity, and accepting of today in history handout.  Patient was observed socializing well with peers.     Status After Intervention:  Improved    Participation Level: Active Listener and Interactive    Participation Quality: Appropriate and Attentive      Speech:  normal      Thought Process/Content: Logical      Affective Functioning: Congruent      Mood: euthymic      Level of consciousness:  Alert and Attentive      Response to Learning: Able to verbalize current knowledge/experience, Able to verbalize/acknowledge new learning, and Progressing to goal      Endings: None Reported    Modes of Intervention: Socialization, Exploration, and Activity      Discipline Responsible: Recreational Therapist      Signature:  ANA Casarez    Group Therapy Note    Attendees: 8

## 2024-12-10 VITALS
RESPIRATION RATE: 16 BRPM | SYSTOLIC BLOOD PRESSURE: 141 MMHG | HEART RATE: 70 BPM | DIASTOLIC BLOOD PRESSURE: 72 MMHG | HEIGHT: 63 IN | TEMPERATURE: 97.8 F | WEIGHT: 120 LBS | OXYGEN SATURATION: 99 % | BODY MASS INDEX: 21.26 KG/M2

## 2024-12-10 PROCEDURE — 6370000000 HC RX 637 (ALT 250 FOR IP): Performed by: FAMILY MEDICINE

## 2024-12-10 PROCEDURE — 99239 HOSP IP/OBS DSCHRG MGMT >30: CPT

## 2024-12-10 RX ORDER — MIRTAZAPINE 15 MG/1
15 TABLET, FILM COATED ORAL NIGHTLY
Qty: 30 TABLET | Refills: 0 | Status: SHIPPED | OUTPATIENT
Start: 2024-12-10 | End: 2025-01-09

## 2024-12-10 RX ORDER — MIDODRINE HYDROCHLORIDE 2.5 MG/1
2.5 TABLET ORAL
Qty: 90 TABLET | Refills: 3 | Status: SHIPPED | OUTPATIENT
Start: 2024-12-10

## 2024-12-10 RX ORDER — VENLAFAXINE HYDROCHLORIDE 75 MG/1
75 CAPSULE, EXTENDED RELEASE ORAL
Qty: 30 CAPSULE | Refills: 0 | Status: SHIPPED | OUTPATIENT
Start: 2024-12-11 | End: 2025-01-10

## 2024-12-10 RX ORDER — METOPROLOL SUCCINATE 25 MG/1
25 TABLET, EXTENDED RELEASE ORAL DAILY
Qty: 30 TABLET | Refills: 0 | Status: SHIPPED | OUTPATIENT
Start: 2024-12-11 | End: 2025-01-10

## 2024-12-10 RX ORDER — NICOTINE 21 MG/24HR
1 PATCH, TRANSDERMAL 24 HOURS TRANSDERMAL DAILY
COMMUNITY
Start: 2024-12-11 | End: 2025-01-10

## 2024-12-10 RX ADMIN — VENLAFAXINE HYDROCHLORIDE 75 MG: 75 CAPSULE, EXTENDED RELEASE ORAL at 08:44

## 2024-12-10 RX ADMIN — PANTOPRAZOLE SODIUM 40 MG: 40 TABLET, DELAYED RELEASE ORAL at 08:44

## 2024-12-10 RX ADMIN — MIDODRINE HYDROCHLORIDE 2.5 MG: 2.5 TABLET ORAL at 12:29

## 2024-12-10 RX ADMIN — METOPROLOL SUCCINATE 25 MG: 25 TABLET, EXTENDED RELEASE ORAL at 08:44

## 2024-12-10 RX ADMIN — MEMANTINE 5 MG: 5 TABLET ORAL at 08:44

## 2024-12-10 RX ADMIN — MIDODRINE HYDROCHLORIDE 2.5 MG: 2.5 TABLET ORAL at 08:44

## 2024-12-10 RX ADMIN — APIXABAN 5 MG: 5 TABLET, FILM COATED ORAL at 08:44

## 2024-12-10 ASSESSMENT — PAIN SCALES - GENERAL: PAINLEVEL_OUTOF10: 0

## 2024-12-10 NOTE — DISCHARGE SUMMARY
DISCHARGE SUMMARY      Patient ID:  Kerri Woo  [id number]  74 y.o.  1950    Admit date: 12/3/2024    Discharge date and time: 12/10/2024    Admitting Physician: Priscilla Medrano MD     Discharge Physician: Dr Marcela CANALES    Discharge Diagnoses:   Patient Active Problem List   Diagnosis    Major depressive disorder, recurrent episode, severe with anxious distress (HCC)    New onset a-fib (HCC)    Suicidal ideation    Mood disorder (HCC)    AMS (altered mental status)    Paroxysmal atrial fibrillation (HCC)    Major depression, recurrent (HCC)       Admission Condition: poor    Discharged Condition: stable    Admission Circumstance: Patient name: Kerri Woo  Patient's past mental health and addiction history: History of major depressive disorder with anxious distress with previous inpatient psychiatric hospitalization last being here at [facility] in April 2024  Patient's presentation to the ED and why the patient needs admission: Suicidal ideation, patient was cutting herself with a kitchen knife at home  Legal status:  []  Voluntary  [x]  Involuntary  []  Probate  Triggering/precipitating events: Unknown  Duration of triggering/precipitating events: Prior to arrival      PAST MEDICAL/PSYCHIATRIC HISTORY:   Past Medical History:   Diagnosis Date    Depression     Post herpetic neuralgia        FAMILY/SOCIAL HISTORY:  No family history on file.  Social History     Socioeconomic History    Marital status:      Spouse name: Not on file    Number of children: Not on file    Years of education: Not on file    Highest education level: Not on file   Occupational History    Not on file   Tobacco Use    Smoking status: Never    Smokeless tobacco: Never   Vaping Use    Vaping status: Never Used   Substance and Sexual Activity    Alcohol use: Not Currently     Comment: occ    Drug use: Never    Sexual activity: Not on file   Other Topics Concern    Not on file   Social History  Patient was seated on the unit she was brighter she brightened with conversation.  States she is feeling more hopeful states she is eating and sleeping better.  States she feels better on medication.  She states he is looking forward to going home and being with her .  Patient's  has been visiting patient daily states he feels patient is doing well.  Medical events were insignificant and patient continued to improve on the floor.  They started  coming out of their room and was attending groups and socializing with peers.  They never made any suicidal statements or any suicidal gestures while on the unit.  Social workers obtained collateral information from patient's  who was able to voicing concerns that they had.  They reported no safety concerns no access to any guns.  Treatment team felt the patient obtain the maximum benefit from there hospitalization they were set up with an outpatient mental health agency for outpatient follow-up services. At the time of discharge patient did not show any impulsive behavior.  They were up on the unit they were attending groups and socializing with peers.  They vehemently denied any suicidal homicidal ideations intent or plan.  They were eating well and sleeping well there are no neurovegetative signs or symptoms of depression he denied any auditory or visual hallucinations.  Patient was able to state their future plans spontaneously with richness of detail. There are no overt or covert signs of psychosis.  They were appreciative of the help that he received here. After stabilization with medications, psycho educations, group milieu, close observation substance was counseling if indicated, patient's mental health status returned to the baseline.  Patient was evaluated for stepping down to a lower level of care.  This patient no longer meets criteria for inpatient hospitalization and they will be referred to the community for ongoing mental health treatment

## 2024-12-10 NOTE — GROUP NOTE
Group Therapy Note    Date: 12/10/2024  Start Time: 0800  End Time:  0820  Number of Participants: 10    Type of Group: Community Meeting    Wellness Binder Information  Module Name:  Community Meeting      Patient's Goal:  Patient will be oriented to the unit including but not limited expectations, staff, programming schedule.  Patient will be able to ID expectations of treatment.     Notes: Patient was a participant in community meeting, and was updated on expectations of the unit, staffing, programming schedule, and acclimated to their environment.    Patient shared goal for today as \"waiting to hear about discharge plans\"    Status After Intervention:  Improved    Participation Level: Active Listener and Interactive    Participation Quality: Appropriate and Attentive      Speech:  normal      Thought Process/Content: Logical      Affective Functioning: Congruent      Mood: euthymic      Level of consciousness:  Alert and Attentive      Response to Learning: Able to verbalize current knowledge/experience, Able to verbalize/acknowledge new learning, and Progressing to goal      Endings: None Reported    Modes of Intervention: Exploration, Clarifying, and Problem-solving      Discipline Responsible: Recreational Therapist      Signature:  ANA Casarez

## 2024-12-10 NOTE — PROGRESS NOTES
Appeared to have very nice visit w supportive , here at approx lunch time.    He along gela Manning read the [place] AVS.       Kerri stated again today at [place] counseling time that she is respectfully declining the flu vaccine, which we offered.

## 2024-12-10 NOTE — CARE COORDINATION
Discharge:    Sw met with pt to discuss discharge. Pt stated that she is ready to go home. She denied SI/HI/AVH. She stated that she will be going home with her  and he will be providing transportation.     Collateral:    Sw met with pts  who stated that he has no concerns or questions. He stated that he is happy that she is going home. He denied any guns/weapons.     Appointments:    Pt has a med appointment on 12/12 and a counseling appointment om 12/17.     In order to ensure appropriate transition and discharge planning is in place, the following documents have been transmitted to [facility] and [facility], as the new outpatient provider:    The d/c diagnosis was transmitted to the next care provider  The reason for hospitalization was transmitted to the next care provider  The d/c medications (dosage and indication) were transmitted to the next care provider   The continuing care plan was transmitted to the next care provider

## 2024-12-10 NOTE — CARE COORDINATION
Marcelo called pt's  Shun [phone] (JIMENA signed) to inform him of discharge. When Marcelo called it stated that the user is busy and to call back again.

## 2024-12-10 NOTE — PLAN OF CARE
Problem: Anxiety  Goal: Will report anxiety at manageable levels  Description: INTERVENTIONS:  1. Administer medication as ordered  2. Teach and rehearse alternative coping skills  3. Provide emotional support with 1:1 interaction with staff  12/9/2024 2041 by Daniella Varela, RN  Outcome: Progressing     Problem: Coping  Goal: Pt/Family able to verbalize concerns and demonstrate effective coping strategies  Description: INTERVENTIONS:  1. Assist patient/family to identify coping skills, available support systems and cultural and spiritual values  2. Provide emotional support, including active listening and acknowledgement of concerns of patient and caregivers  3. Reduce environmental stimuli, as able  4. Instruct patient/family in relaxation techniques, as appropriate  5. Assess for spiritual pain/suffering and initiate Spiritual Care, Psychosocial Clinical Specialist consults as needed  12/9/2024 2041 by Daniella Varela, RN  Outcome: Progressing

## 2024-12-10 NOTE — GROUP NOTE
Date: 12/10/2024  Start Time: 1040  End Time:  1105  Number of Participants: 9    Type of Group: Recreational    Wellness Binder Information  Module Name:  Holiday triclarence and name that lindsay castro    Patient's Goal:  to enhance socialization amongst peer.  To improve overall mood.    Notes:  Pt was an active participant in activity.  Patient exhibited a more relaxed behavior.     Status After Intervention:  Improved    Participation Level: Active Listener and Interactive    Participation Quality: Appropriate and Attentive      Speech:  normal      Thought Process/Content: Logical      Affective Functioning: Congruent      Mood: euthymic      Level of consciousness:  Alert and Attentive      Response to Learning: Able to verbalize current knowledge/experience, Able to verbalize/acknowledge new learning, and Progressing to goal      Endings: None Reported    Modes of Intervention: Socialization and Activity      Discipline Responsible: Recreational Therapist      Signature:  ANA Casarez    Group Therapy Note    Attendees: 9

## 2024-12-10 NOTE — CARE COORDINATION
Sw called [facility] [phone]. They stated that their system is down and they will call this worker back.

## 2024-12-10 NOTE — PROGRESS NOTES
Patient denies suicidal ideation, homicidal ideations and AVH.  Presents flat, sad, calm and cooperative during assessment.  Patient is out on the unit.  Medications taken without issue.  No complaints or concerns verbalized at this time.  No unit problems reported.  Will continue to observe and support.

## 2024-12-10 NOTE — GROUP NOTE
Date: 12/10/2024  Start Time: 1000  End Time:  1040  Number of Participants: 9    Type of Group: Psychoeducation    Wellness Binder Information  Module Name:  Eliminating toxic influences/Hold on Let go    Patient's Goal:  Patient will be able to ID toxic influences one may have in their life, and will be able to ID what negative qualities patient needs to rid from their life.     Notes:  CTRS led educational discussion on different toxic influences one may have in their life.  Patient was an active participant in discussion.  Patient participated in activity of Id holding on and letting go of things that do not serve them.     Status After Intervention:  Improved    Participation Level: Active Listener and Interactive    Participation Quality: Appropriate and Attentive and sharing      Speech:  normal      Thought Process/Content: Logical      Affective Functioning: Congruent      Mood: euthymic      Level of consciousness:  Alert and Attentive      Response to Learning: Able to verbalize current knowledge/experience, Able to verbalize/acknowledge new learning, and Progressing to goal      Endings: None Reported    Modes of Intervention: Education, Exploration, Clarifying, and Problem-solving      Discipline Responsible: Recreational Therapist      Signature:  ANA Casarez

## 2024-12-10 NOTE — PROGRESS NOTES
CLINICAL PHARMACY NOTE: MEDS TO BEDS    Total # of Prescriptions Filled: 5   The following medications were delivered to the patient:  Eliquis 5 mg  Venlafaxine 75 mg  Metoprolol succinate 25 mg.  Midodrine 2.5 mg  Mirtazapine 15 mg    Additional Documentation:   Ankit Haley RN picked up in the pharmacy

## 2024-12-10 NOTE — CARE COORDINATION
Marcelo called pt's  Shun [phone] (JIMENA signed). Marcelo tried multiple times to reach  and Marcelo was unable to get a hold of him.

## 2024-12-10 NOTE — TRANSITION OF CARE
border, Primary or Immunocompromised, (age 12y+), IM, 100 mcg/0.5mL 03/12/2021    TD 5LF, TENIVAC, (age 7y+), IM, 0.5mL 11/25/2024     Influenza Vaccination Status: Documentation of patient's or caregiver's refusal of influenza vaccine.    Screening for Metabolic Disorders for Patients on Antipsychotic Medications  (Data obtained from the EMR)    Estimated Body Mass Index  Body mass index is 21.26 kg/m².      Vital Signs/Blood Pressure  BP (!) 141/72   Pulse 70   Temp 97.8 °F (36.6 °C) (Temporal)   Resp 16   Ht 1.6 m (5' 3\")   Wt 54.4 kg (120 lb)   SpO2 99%   BMI 21.26 kg/m²      Fasting Blood Glucose or Hemoglobin A1c  No results found for: \"GLU\", \"GLUCPOC\"    Hemoglobin A1C   Date Value Ref Range Status   04/03/2024 5.6 4.0 - 5.6 % Final       Discharge Diagnosis: Major depressive disorder, recurrent episode, severe with anxious distress (HCC)     Discharge Plan/Destination: home with .    Collat interview already completed by     Discharge Medication List and Instructions:      Medication List        START taking these medications      apixaban 5 MG Tabs tablet  Commonly known as: ELIQUIS  Take 1 tablet by mouth 2 times daily     metoprolol succinate 25 MG extended release tablet  Commonly known as: TOPROL XL  Take 1 tablet by mouth daily  Start taking on: December 11, 2024     midodrine 2.5 MG tablet  Commonly known as: PROAMATINE  Take 1 tablet by mouth 3 times daily (with meals)     mirtazapine 15 MG tablet  Commonly known as: REMERON  Take 1 tablet by mouth nightly     nicotine 21 MG/24HR  Commonly known as: NICODERM CQ  Place 1 patch onto the skin daily  Start taking on: December 11, 2024            CONTINUE taking these medications      melatonin 3 MG Tabs tablet  Take 1 tablet by mouth nightly as needed (sleep)     memantine 5 MG tablet  Commonly known as: NAMENDA  Take 1 tablet by mouth 2 times daily     omeprazole 20 MG delayed release capsule  Commonly known as: PRILOSEC     venlafaxine  Psychiatric Advance Directive, the patient was offered information on these advance directives Other  NA. , poa, states they have both of these covered on their advance directive paperwork at home    Patient Instructions: Please continue all medications until otherwise directed by physician.      Tobacco Cessation Discharge Plan:   Is the patient a tobacco user  and needs referral for tobacco cessation? No  Patient referred to the following for tobacco cessation with an appointment? No   NA  Patient was offered medication to assist with tobacco cessation at discharge? No   NA    Alcohol/Substance Abuse Discharge Plan:   Does the patient have a history of substance/alcohol abuse and requires a referral for treatment? No  Patient referred to the following for substance/alcohol abuse treatment with an appointment? No   NA  Patient was offered medication to assist with substance/alcohol abuse cessation at discharge? No  NA      Patient discharged to: Home; Transition record discussed with patient/caregiver and provided this record in hard copy or electronically

## 2025-01-15 NOTE — DISCHARGE SUMMARY
[facility]   Discharge summary   Patient ID:  Kerri Woo  [id number]  74 y.o.  1950    Admit date: 11/30/2024    Discharge date and time: 12/3/2024    Admission Diagnoses:   Patient Active Problem List   Diagnosis    Major depressive disorder, recurrent episode, severe with anxious distress (HCC)    New onset a-fib (HCC)    Suicidal ideation    Mood disorder (HCC)    AMS (altered mental status)    Paroxysmal atrial fibrillation (HCC)    Major depression, recurrent (HCC)       Discharge Diagnoses: Afib/RVR    Consults: cardiology    Procedures:     Hospital Course: The patient is a 74 y.o. female of Castro Boles Jr., MD     74-year-old female presents to the ED with complaints of suicidal ideation and is admitted to telemetry unit with     New onset A-fib  Telemetry monitoring  Rate control medications  Lovenox 1 mg/kilogram twice daily-can be transition to p.o. Eliquis 5 twice daily  Check TSH  Cardiology consulted  rate is adequately controlled on my evaluation currently, she she is cleared from medicine standpoint to be transferred to inpatient psych-no need for medicine admission     Attempted suicide with plan and action taken  CO at bedside, daughter at bedside on my evaluation  Consult psychiatry  Pink [id number] hold  She denies taking any medications and her suicide attempt but did cut herself in multiple places including wrists and arms and abdomen        11/30/2024  Transferred to medical floor secondary to fall  No loss of consciousness according to patient  IV fluids  Monitor on telemetry overnight to ensure no further issues such as bradycardia  No evidence of TIA or stroke  Check orthostatics  From medicine standpoint she can be transferred back to inpatient psych tomorrow     12/1/2024  Heart rate is still fluctuating between 90s and 130s-on Toprol-XL 25 daily  Toprol-XL is likely the reason that she sustained a near syncope/syncopal episode on psych floor  We